# Patient Record
Sex: FEMALE | Race: WHITE | NOT HISPANIC OR LATINO | Employment: OTHER | URBAN - METROPOLITAN AREA
[De-identification: names, ages, dates, MRNs, and addresses within clinical notes are randomized per-mention and may not be internally consistent; named-entity substitution may affect disease eponyms.]

---

## 2017-03-24 ENCOUNTER — HOSPITAL ENCOUNTER (EMERGENCY)
Facility: MEDICAL CENTER | Age: 77
End: 2017-03-24
Attending: EMERGENCY MEDICINE
Payer: MEDICARE

## 2017-03-24 ENCOUNTER — APPOINTMENT (OUTPATIENT)
Dept: RADIOLOGY | Facility: MEDICAL CENTER | Age: 77
End: 2017-03-24
Attending: EMERGENCY MEDICINE
Payer: MEDICARE

## 2017-03-24 VITALS
HEART RATE: 68 BPM | BODY MASS INDEX: 26.25 KG/M2 | WEIGHT: 177.25 LBS | DIASTOLIC BLOOD PRESSURE: 82 MMHG | SYSTOLIC BLOOD PRESSURE: 143 MMHG | HEIGHT: 69 IN | RESPIRATION RATE: 16 BRPM | OXYGEN SATURATION: 96 % | TEMPERATURE: 98.4 F

## 2017-03-24 DIAGNOSIS — M54.32 SCIATICA OF LEFT SIDE: ICD-10-CM

## 2017-03-24 LAB
APPEARANCE UR: CLEAR
BILIRUB UR QL STRIP.AUTO: NEGATIVE
COLOR UR: ABNORMAL
CULTURE IF INDICATED INDCX: NO UA CULTURE
EKG IMPRESSION: NORMAL
GLUCOSE UR STRIP.AUTO-MCNC: NEGATIVE MG/DL
KETONES UR STRIP.AUTO-MCNC: ABNORMAL MG/DL
LEUKOCYTE ESTERASE UR QL STRIP.AUTO: NEGATIVE
MICRO URNS: ABNORMAL
NITRITE UR QL STRIP.AUTO: NEGATIVE
PH UR STRIP.AUTO: 5 [PH]
PROT UR QL STRIP: NEGATIVE MG/DL
RBC UR QL AUTO: NEGATIVE
SP GR UR REFRACTOMETRY: 1.03

## 2017-03-24 PROCEDURE — 99284 EMERGENCY DEPT VISIT MOD MDM: CPT

## 2017-03-24 PROCEDURE — 81003 URINALYSIS AUTO W/O SCOPE: CPT

## 2017-03-24 PROCEDURE — A9270 NON-COVERED ITEM OR SERVICE: HCPCS | Performed by: EMERGENCY MEDICINE

## 2017-03-24 PROCEDURE — 93005 ELECTROCARDIOGRAM TRACING: CPT

## 2017-03-24 PROCEDURE — 72131 CT LUMBAR SPINE W/O DYE: CPT

## 2017-03-24 PROCEDURE — 74176 CT ABD & PELVIS W/O CONTRAST: CPT

## 2017-03-24 PROCEDURE — 700102 HCHG RX REV CODE 250 W/ 637 OVERRIDE(OP): Performed by: EMERGENCY MEDICINE

## 2017-03-24 RX ORDER — OXYCODONE HYDROCHLORIDE AND ACETAMINOPHEN 5; 325 MG/1; MG/1
1 TABLET ORAL ONCE
Status: COMPLETED | OUTPATIENT
Start: 2017-03-24 | End: 2017-03-24

## 2017-03-24 RX ORDER — METHYLPREDNISOLONE 4 MG/1
TABLET ORAL
Qty: 1 KIT | Refills: 0 | Status: SHIPPED | OUTPATIENT
Start: 2017-03-24 | End: 2017-05-07

## 2017-03-24 RX ORDER — IBUPROFEN 600 MG/1
600 TABLET ORAL ONCE
Status: COMPLETED | OUTPATIENT
Start: 2017-03-24 | End: 2017-03-24

## 2017-03-24 RX ORDER — OXYCODONE HYDROCHLORIDE AND ACETAMINOPHEN 5; 325 MG/1; MG/1
1-2 TABLET ORAL EVERY 6 HOURS PRN
Qty: 20 TAB | Refills: 0 | Status: SHIPPED | OUTPATIENT
Start: 2017-03-24 | End: 2017-05-07

## 2017-03-24 RX ORDER — OMEPRAZOLE 20 MG/1
20 CAPSULE, DELAYED RELEASE ORAL DAILY
Qty: 20 CAP | Refills: 0 | Status: SHIPPED | OUTPATIENT
Start: 2017-03-24 | End: 2017-05-07

## 2017-03-24 RX ADMIN — IBUPROFEN 600 MG: 600 TABLET ORAL at 13:00

## 2017-03-24 RX ADMIN — OXYCODONE HYDROCHLORIDE AND ACETAMINOPHEN 1 TABLET: 5; 325 TABLET ORAL at 13:00

## 2017-03-24 ASSESSMENT — PAIN SCALES - GENERAL: PAINLEVEL_OUTOF10: 8

## 2017-03-24 NOTE — ED AVS SNAPSHOT
3/24/2017          Ailyn Saavedra  [address] [facility] [address]    Dear Ailyn:    [facility] wants to ensure your discharge home is safe and you or your loved ones have had all your questions answered regarding your care after you leave the hospital.    You may receive a telephone call within two days of your discharge.  This call is to make certain you understand your discharge instructions as well as ensure we provided you with the best care possible during your stay with us.     The call will only last approximately 3-5 minutes and will be done by a nurse.    Once again, we want to ensure your discharge home is safe and that you have a clear understanding of any next steps in your care.  If you have any questions or concerns, please do not hesitate to contact us, we are here for you.  Thank you for choosing [facility] for your healthcare needs.    Sincerely,    Frank Crawford    [facility]

## 2017-03-24 NOTE — ED AVS SNAPSHOT
Home Care Instructions                                                                                                                Ailyn Saavedra   MRN: [id number]    Department:  [facility], Emergency Dept   Date of Visit:  3/24/2017            [facility], Emergency Dept    [address]    [place] [address]    Phone:  [phone]      You were seen by     Jarrett Seth M.D.      Your Diagnosis Was     Sciatica of left side     M54.32       Follow-up Information     1. Follow up with Elie Sandoval M.D..    Specialty:  Internal Medicine    Contact information    [address]  [place] [address] [phone]          2. Follow up with Ramón Cooley M.D..    Specialty:  Oncology    Contact information    [address]   [address]  [place] [address] [phone]        Medication Information     Review all of your home medications and newly ordered medications with your primary doctor and/or pharmacist as soon as possible. Follow medication instructions as directed by your doctor and/or pharmacist.     Please keep your complete medication list with you and share with your physician. Update the information when medications are discontinued, doses are changed, or new medications (including over-the-counter products) are added; and carry medication information at all times in the event of emergency situations.               Medication List      START taking these medications        Instructions    Morning Afternoon Evening Bedtime    MethylPREDNISolone 4 MG Tbpk   Commonly known as:  MEDROL DOSEPAK        Use as directed                        omeprazole 20 MG delayed-release capsule   Commonly known as:  PRILOSEC        Take 1 Cap by mouth every day.   Dose:  20 mg                        oxycodone-acetaminophen 5-325 MG Tabs   Commonly known as:  PERCOCET        Take 1-2 Tabs by mouth every 6 hours as needed (as needed for pain).   Dose:  1-2 Tab                               Where to Get Your Medications      You can get these medications from any pharmacy     Bring a paper prescription for each of these medications    - MethylPREDNISolone 4 MG Tbpk  - omeprazole 20 MG delayed-release capsule  - oxycodone-acetaminophen 5-325 MG Tabs            Procedures and tests performed during your visit     CT-LSPINE W/O PLUS RECONS    CT-RENAL COLIC EVALUATION(A/P W/O)    EKG (ER)    REFRACTOMETER SG    URINALYSIS,CULTURE IF INDICATED        Discharge Instructions       Sciatica    Medrol for inflammation--take Prilosec while doing this.  (Do not take Ibuprofen or Naproxen while on this.)    Percocet OR Vicodin (your old prescription) for further pain.  Call today to make follow up appt with your doctor next week for recheck for both your back and those nodules in your chest.    Sciatica is pain, weakness, numbness, or tingling along the path of the sciatic nerve. The nerve starts in the lower back and runs down the back of each leg. The nerve controls the muscles in the lower leg and in the back of the knee, while also providing sensation to the back of the thigh, lower leg, and the sole of your foot. Sciatica is a symptom of another medical condition. For instance, nerve damage or certain conditions, such as a herniated disk or bone spur on the spine, pinch or put pressure on the sciatic nerve. This causes the pain, weakness, or other sensations normally associated with sciatica. Generally, sciatica only affects one side of the body.  CAUSES   · Herniated or slipped disc.  · Degenerative disk disease.  · A pain disorder involving the narrow muscle in the buttocks (piriformis syndrome).  · Pelvic injury or fracture.  · Pregnancy.  · Tumor (rare).  SYMPTOMS   Symptoms can vary from mild to very severe. The symptoms usually travel from the low back to the buttocks and down the back of the leg. Symptoms can include:  · Mild tingling or dull aches in the lower back, leg,  or hip.  · Numbness in the back of the calf or sole of the foot.  · Burning sensations in the lower back, leg, or hip.  · Sharp pains in the lower back, leg, or hip.  · Leg weakness.  · Severe back pain inhibiting movement.  These symptoms may get worse with coughing, sneezing, laughing, or prolonged sitting or standing. Also, being overweight may worsen symptoms.  DIAGNOSIS   Your caregiver will perform a physical exam to look for common symptoms of sciatica. He or she may ask you to do certain movements or activities that would trigger sciatic nerve pain. Other tests may be performed to find the cause of the sciatica. These may include:  · Blood tests.  · X-rays.  · Imaging tests, such as an MRI or CT scan.  TREATMENT   Treatment is directed at the cause of the sciatic pain. Sometimes, treatment is not necessary and the pain and discomfort goes away on its own. If treatment is needed, your caregiver may suggest:  · Over-the-counter medicines to relieve pain.  · Prescription medicines, such as anti-inflammatory medicine, muscle relaxants, or narcotics.  · Applying heat or ice to the painful area.  · Steroid injections to lessen pain, irritation, and inflammation around the nerve.  · Reducing activity during periods of pain.  · Exercising and stretching to strengthen your abdomen and improve flexibility of your spine. Your caregiver may suggest losing weight if the extra weight makes the back pain worse.  · Physical therapy.  · Surgery to eliminate what is pressing or pinching the nerve, such as a bone spur or part of a herniated disk.  HOME CARE INSTRUCTIONS   · Only take over-the-counter or prescription medicines for pain or discomfort as directed by your caregiver.  · Apply ice to the affected area for 20 minutes, 3-4 times a day for the first 48-72 hours. Then try heat in the same way.  · Exercise, stretch, or perform your usual activities if these do not aggravate your pain.  · Attend physical therapy sessions  as directed by your caregiver.  · Keep all follow-up appointments as directed by your caregiver.  · Do not wear high heels or shoes that do not provide proper support.  · Check your mattress to see if it is too soft. A firm mattress may lessen your pain and discomfort.  SEEK IMMEDIATE MEDICAL CARE IF:   · You lose control of your bowel or bladder (incontinence).  · You have increasing weakness in the lower back, pelvis, buttocks, or legs.  · You have redness or swelling of your back.  · You have a burning sensation when you urinate.  · You have pain that gets worse when you lie down or awakens you at night.  · Your pain is worse than you have experienced in the past.  · Your pain is lasting longer than 4 weeks.  · You are suddenly losing weight without reason.  MAKE SURE YOU:  · Understand these instructions.  · Will watch your condition.  · Will get help right away if you are not doing well or get worse.     This information is not intended to replace advice given to you by your health care provider. Make sure you discuss any questions you have with your health care provider.     Document Released: 12/12/2002 Document Revised: 06/18/2013 Document Reviewed: 04/28/2013  [employer] Interactive Patient Education ©2016 [employer]            Patient Information     Patient Information    Following emergency treatment: all patient requiring follow-up care must return either to a private physician or a clinic if your condition worsens before you are able to obtain further medical attention, please return to the emergency room.     Billing Information    At [facility], we work to make the billing process streamlined for our patients.  Our Representatives are here to answer any questions you may have regarding your hospital bill.  If you have insurance coverage and have supplied your insurance information to us, we will submit a claim to your insurer on your behalf.  Should you have any questions regarding your bill, we  can be reached online or by phone as follows:  Online: You are able pay your bills online or live chat with our representatives about any billing questions you may have. We are here to help Monday - Friday from 8:00am to 7:30pm and 9:00am - 12:00pm on Saturdays.  Please visit [email or web address]  for more information.   Phone:  [phone] or [phone]    Please note that your emergency physician, surgeon, pathologist, radiologist, anesthesiologist, and other specialists are not employed by [facility] and will therefore bill separately for their services.  Please contact them directly for any questions concerning their bills at the numbers below:     [facility] Physician [facility]:  [phone]  [facility]:  [phone]  [facility]:  [phone]  [facility]:  [phone]    1. Your final bill may vary from the amount quoted upon discharge if all procedures are not complete at that time, or if your doctor has additional procedures of which we are not aware. You will receive an additional bill if you return to the Emergency Department at [facility] for suture removal regardless of the facility of which the sutures were placed.     2. Please arrange for settlement of this account at the emergency registration.    3. All self-pay accounts are due in full at the time of treatment.  If you are unable to meet this obligation then payment is expected within 4-5 days.     4. If you have had radiology studies (CT, X-ray, Ultrasound, MRI), you have received a preliminary result during your emergency department visit. Please contact the radiology department [phone] to receive a copy of your final result. Please discuss the Final result with your primary physician or with the follow up physician provided.     Crisis Hotline:  [place] Crisis Hotline:  3-288-WIACRUR or 1-135.803.2355  [place] Crisis Hotline:    1-577.700.4603 or  463.279.5029         ED Discharge Follow Up Questions    1. In order to provide you with very good care, we would like to follow up with a phone call in the next few days.  May we have your permission to contact you?     YES /  NO    2. What is the best phone number to call you? (       )_____-__________    3. What is the best time to call you?      Morning  /  Afternoon  /  Evening                   Patient Signature:  ____________________________________________________________    Date:  ____________________________________________________________

## 2017-03-24 NOTE — ED NOTES
Pt amb to triage c/o L sciatica pain since last night. Pt was attempting to move a heavy chair yesterday.

## 2017-03-24 NOTE — ED AVS SNAPSHOT
Biophytis Access Code: [email or web address]  Expires: 4/23/2017  1:12 PM    Biophytis  A secure, online tool to manage your health information     [employer]’s Biophytis® is a secure, online tool that connects you to your personalized health information from the privacy of your home -- day or night - making it very easy for you to manage your healthcare. Once the activation process is completed, you can even access your medical information using the Biophytis ozzy, which is available for free in the Apple Ozzy store or Google Play store.     Biophytis provides the following levels of access (as shown below):   My Chart Features   [facility] Primary Care Doctor [facility]  Specialists [facility]  Urgent  Care Non-[facility]  Primary Care  Doctor   Email your healthcare team securely and privately 24/7 X X X X   Manage appointments: schedule your next appointment; view details of past/upcoming appointments X      Request prescription refills. X      View recent personal medical records, including lab and immunizations X X X X   View health record, including health history, allergies, medications X X X X   Read reports about your outpatient visits, procedures, consult and ER notes X X X X   See your discharge summary, which is a recap of your hospital and/or ER visit that includes your diagnosis, lab results, and care plan. X X       How to register for Biophytis:  1. Go to  [email or web address].  2. Click on the Sign Up Now box, which takes you to the New Member Sign Up page. You will need to provide the following information:  a. Enter your Biophytis Access Code exactly as it appears at the top of this page. (You will not need to use this code after you’ve completed the sign-up process. If you do not sign up before the expiration date, you must request a new code.)   b. Enter your date of birth.   c. Enter your home email address.   d. Click Submit, and follow the next screen’s instructions.  3. Create a Biophytis ID. This will be your Biophytis  login ID and cannot be changed, so think of one that is secure and easy to remember.  4. Create a [employer] password. You can change your password at any time.  5. Enter your Password Reset Question and Answer. This can be used at a later time if you forget your password.   6. Enter your e-mail address. This allows you to receive e-mail notifications when new information is available in [employer].  7. Click Sign Up. You can now view your health information.    For assistance activating your [employer] account, call [phone]

## 2017-03-24 NOTE — DISCHARGE INSTRUCTIONS
Sciatica    Medrol for inflammation--take Prilosec while doing this.  (Do not take Ibuprofen or Naproxen while on this.)    Percocet OR Vicodin (your old prescription) for further pain.  Call today to make follow up appt with your doctor next week for recheck for both your back and those nodules in your chest.    Sciatica is pain, weakness, numbness, or tingling along the path of the sciatic nerve. The nerve starts in the lower back and runs down the back of each leg. The nerve controls the muscles in the lower leg and in the back of the knee, while also providing sensation to the back of the thigh, lower leg, and the sole of your foot. Sciatica is a symptom of another medical condition. For instance, nerve damage or certain conditions, such as a herniated disk or bone spur on the spine, pinch or put pressure on the sciatic nerve. This causes the pain, weakness, or other sensations normally associated with sciatica. Generally, sciatica only affects one side of the body.  CAUSES   · Herniated or slipped disc.  · Degenerative disk disease.  · A pain disorder involving the narrow muscle in the buttocks (piriformis syndrome).  · Pelvic injury or fracture.  · Pregnancy.  · Tumor (rare).  SYMPTOMS   Symptoms can vary from mild to very severe. The symptoms usually travel from the low back to the buttocks and down the back of the leg. Symptoms can include:  · Mild tingling or dull aches in the lower back, leg, or hip.  · Numbness in the back of the calf or sole of the foot.  · Burning sensations in the lower back, leg, or hip.  · Sharp pains in the lower back, leg, or hip.  · Leg weakness.  · Severe back pain inhibiting movement.  These symptoms may get worse with coughing, sneezing, laughing, or prolonged sitting or standing. Also, being overweight may worsen symptoms.  DIAGNOSIS   Your caregiver will perform a physical exam to look for common symptoms of sciatica. He or she may ask you to do certain movements or  activities that would trigger sciatic nerve pain. Other tests may be performed to find the cause of the sciatica. These may include:  · Blood tests.  · X-rays.  · Imaging tests, such as an MRI or CT scan.  TREATMENT   Treatment is directed at the cause of the sciatic pain. Sometimes, treatment is not necessary and the pain and discomfort goes away on its own. If treatment is needed, your caregiver may suggest:  · Over-the-counter medicines to relieve pain.  · Prescription medicines, such as anti-inflammatory medicine, muscle relaxants, or narcotics.  · Applying heat or ice to the painful area.  · Steroid injections to lessen pain, irritation, and inflammation around the nerve.  · Reducing activity during periods of pain.  · Exercising and stretching to strengthen your abdomen and improve flexibility of your spine. Your caregiver may suggest losing weight if the extra weight makes the back pain worse.  · Physical therapy.  · Surgery to eliminate what is pressing or pinching the nerve, such as a bone spur or part of a herniated disk.  HOME CARE INSTRUCTIONS   · Only take over-the-counter or prescription medicines for pain or discomfort as directed by your caregiver.  · Apply ice to the affected area for 20 minutes, 3-4 times a day for the first 48-72 hours. Then try heat in the same way.  · Exercise, stretch, or perform your usual activities if these do not aggravate your pain.  · Attend physical therapy sessions as directed by your caregiver.  · Keep all follow-up appointments as directed by your caregiver.  · Do not wear high heels or shoes that do not provide proper support.  · Check your mattress to see if it is too soft. A firm mattress may lessen your pain and discomfort.  SEEK IMMEDIATE MEDICAL CARE IF:   · You lose control of your bowel or bladder (incontinence).  · You have increasing weakness in the lower back, pelvis, buttocks, or legs.  · You have redness or swelling of your back.  · You have a burning  sensation when you urinate.  · You have pain that gets worse when you lie down or awakens you at night.  · Your pain is worse than you have experienced in the past.  · Your pain is lasting longer than 4 weeks.  · You are suddenly losing weight without reason.  MAKE SURE YOU:  · Understand these instructions.  · Will watch your condition.  · Will get help right away if you are not doing well or get worse.     This information is not intended to replace advice given to you by your health care provider. Make sure you discuss any questions you have with your health care provider.     Document Released: 12/12/2002 Document Revised: 06/18/2013 Document Reviewed: 04/28/2013  [employer] Interactive Patient Education ©2016 [employer]

## 2017-03-24 NOTE — ED PROVIDER NOTES
ED Provider Note    CHIEF COMPLAINT  Chief Complaint   Patient presents with   • Low Back Pain       HPI  Ailyn Saavedra is a 76 y.o. female who presents complaining of low back pain. She has pain over her left buttock which radiates all the way down to her ankle. She was moving a heavy chair yesterday wonders if she may have injured it doing this, but it did not hurt initially. It started hurting later yesterday evening. It hurts if she moves certain ways patient denies any other trauma. She tried a hydrocodone from 4-1/2 years ago which did not help. She presents here for further evaluation. She denies any bowel or bladder changes. No fever or chills. No belly pain. She is very active with tenderness and raising her great grandchild.    PAST MEDICAL HISTORY  Past Medical History   Diagnosis Date   • Breast cancer (CMS-HCC)    • Other specified symptom associated with female genital organs    • CATARACT    • Cancer (CMS-[facility])      eye lid and breast    • Breath shortness      with exercise       FAMILY HISTORY  Family History   Problem Relation Age of Onset   • Cancer Mother      breast    • Cancer Sister      breast    • Cancer Sister      breast    • Cancer Sister      breast        SOCIAL HISTORY  Social History   Substance Use Topics   • Smoking status: Former Smoker -- 0.50 packs/day for 3 years     Types: Cigarettes     Quit date: 01/01/1959   • Smokeless tobacco: Never Used   • Alcohol Use: 2.4 oz/week     4 Glasses of wine per week         SURGICAL HISTORY  Past Surgical History   Procedure Laterality Date   • Lumpectomy     • Pr radiation therapy plan simple     • Other       cataracts   • Hysteroscopy with video operative  4/3/2013     Performed by Demetrio Webb M.D. at SURGERY SAME DAY [facility] ORS   • Dilation and curettage  4/3/2013     Performed by Demetrio Webb M.D. at SURGERY SAME DAY [facility] ORS       CURRENT MEDICATIONS  Home Medications     Reviewed by Damian Ackerman (Pharmacy  "Tech) on 03/24/17 at 1101  Med List Status: Complete    Medication Last Dose Status          Patient Manish Taking any Medications                        I have reviewed the nurses notes and/or the list brought with the patient.    ALLERGIES  No Known Allergies    REVIEW OF SYSTEMS  See HPI for further details. Review of systems as above, otherwise all other systems are negative.     PHYSICAL EXAM  VITAL SIGNS: /82 mmHg  Pulse 57  Temp(Src) 36.9 °C (98.4 °F)  Resp 16  Ht 1.753 m (5' 9\")  Wt 80.4 kg (177 lb 4 oz)  BMI 26.16 kg/m2  SpO2 94%  Constitutional: Well appearing patient in no acute distress.  Not toxic, nor ill in appearance.  HENT: Mucus membranes moist.  Oropharynx is clear.  Eyes: Pupils equally round.  No scleral icterus.   Neck: Full nontender range of motion.  Lymphatic: No cervical lymphadenopathy noted.   Cardiovascular: Regular heart rate and rhythm.  No murmurs, rubs, nor gallop appreciated.   Thorax & Lungs: Chest is nontender.  Lungs are clear to auscultation with good air movement bilaterally.  No wheeze, rhonchi, nor rales.   Abdomen: Soft, with no tenderness, rebound nor guarding.  No mass, pulsatile mass, nor hepatosplenomegaly appreciated.  Skin: No purpura nor petechia noted.  Extremities/Musculoskeletal: No sign of trauma.  Calves are nontender with no cords nor edema.  No Hernán's sign.  Pulses are intact all around.   Neurologic: Alert & oriented.  Strength and sensation is intact all around.  Gait is normal. Negative straight leg raise. Deep tendon reflexes at the knee and ankle are intact.  Psychiatric: Normal affect appropriate for the clinical situation.    EKG  I interpreted this EKG myself.  This is a 12-lead study.  The rhythm is sinus with a rate of 59.  There are no ST segment nor T wave abnormalities.  Interpretation: No ST segment elevation myocardial infarction.    LABS  Labs Reviewed   URINALYSIS,CULTURE IF INDICATED - Abnormal; Notable for the following:     " Ketones Trace (*)     All other components within normal limits   REFRACTOMETER SG         RADIOLOGY/PROCEDURES  I have reviewed the patient's film interpretations myself, and they are read out by the radiologist as:   CT-LSPINE W/O PLUS RECONS   Final Result      1.  No acute findings.      2.  Mild multilevel degenerative disc disease and facet arthropathy as described.      CT-RENAL COLIC EVALUATION(A/P W/O)   Final Result      1.  No acute intra-abdominal findings.      2.  Atherosclerosis.      3. Bibasilar pulmonary nodules. If there are no significant risk factors, follow-up is recommended in 6-12 months. If the patient has risk factors for lung cancer, follow-up is recommended in 3-6 months. If the patient has a history of malignancy,    follow-up per oncologic guidelines.  (Fleischner society guidelines, Radiology, 237:2005)        .    MEDICAL RECORD  I have reviewed patient's medical record and pertinent results are listed above.    COURSE & MEDICAL DECISION MAKING  I have reviewed any medical record information, laboratory studies and radiographic results as noted above.  Patient presents with what sounds to be lumbar radiculopathy. Her examination is reassuring. She has no red flag symptoms or signs of epidural abscess or cord compression. However, because of her age I have obtained imaging. Her belly CT shows no evidence of acute abdominal findings such as AAA or masses. She does have some pulmonary nodules. She does have a history of breast cancer; as far she knows she is disease free for the last 10 years. I did recommend that she touch base this upcoming week with Dr. oColey her oncologist regarding this. She may also follow with Dr. Sandoval her personal doctor for this, but also like her to follow up regarding her back with him. I will go ahead and put her on a Medrol Dosepak, she is to take Prilosec while doing this. I did advise against Naprosyn or ibuprofen while taking steroids. We will go ahead  and write her for Percocet as well, she may take this or her previous Norco for continued pain.    FINAL IMPRESSION  1. Sciatica of left side     2. Pulmonary nodules       This dictation was created using voice recognition software.    Electronically signed by: Jarrett Seth, 3/24/2017 11:25 AM

## 2017-05-07 ENCOUNTER — HOSPITAL ENCOUNTER (EMERGENCY)
Facility: MEDICAL CENTER | Age: 77
End: 2017-05-07
Attending: GENERAL ACUTE CARE HOSPITAL
Payer: MEDICARE

## 2017-05-07 ENCOUNTER — APPOINTMENT (OUTPATIENT)
Dept: RADIOLOGY | Facility: MEDICAL CENTER | Age: 77
End: 2017-05-07
Attending: GENERAL ACUTE CARE HOSPITAL
Payer: MEDICARE

## 2017-05-07 VITALS
WEIGHT: 178.57 LBS | RESPIRATION RATE: 16 BRPM | HEART RATE: 82 BPM | DIASTOLIC BLOOD PRESSURE: 87 MMHG | SYSTOLIC BLOOD PRESSURE: 158 MMHG | TEMPERATURE: 97.6 F | HEIGHT: 69 IN | BODY MASS INDEX: 26.45 KG/M2 | OXYGEN SATURATION: 95 %

## 2017-05-07 DIAGNOSIS — S42.292A FRACTURE OF HUMERAL HEAD, LEFT, CLOSED, INITIAL ENCOUNTER: ICD-10-CM

## 2017-05-07 PROCEDURE — 99284 EMERGENCY DEPT VISIT MOD MDM: CPT

## 2017-05-07 PROCEDURE — 700102 HCHG RX REV CODE 250 W/ 637 OVERRIDE(OP): Performed by: GENERAL ACUTE CARE HOSPITAL

## 2017-05-07 PROCEDURE — A9270 NON-COVERED ITEM OR SERVICE: HCPCS | Performed by: GENERAL ACUTE CARE HOSPITAL

## 2017-05-07 PROCEDURE — 73030 X-RAY EXAM OF SHOULDER: CPT | Mod: LT

## 2017-05-07 RX ORDER — OXYCODONE HYDROCHLORIDE AND ACETAMINOPHEN 5; 325 MG/1; MG/1
1 TABLET ORAL ONCE
Status: COMPLETED | OUTPATIENT
Start: 2017-05-07 | End: 2017-05-07

## 2017-05-07 RX ORDER — ACETAMINOPHEN 500 MG
500-1000 TABLET ORAL EVERY 6 HOURS PRN
Status: SHIPPED | COMMUNITY
End: 2019-09-12

## 2017-05-07 RX ADMIN — OXYCODONE HYDROCHLORIDE AND ACETAMINOPHEN 1 TABLET: 5; 325 TABLET ORAL at 19:07

## 2017-05-07 ASSESSMENT — PAIN SCALES - GENERAL
PAINLEVEL_OUTOF10: 4
PAINLEVEL_OUTOF10: 7

## 2017-05-07 NOTE — ED AVS SNAPSHOT
Home Care Instructions                                                                                                                Ailyn Saavedra   MRN: [id number]    Department:  [facility], Emergency Dept   Date of Visit:  5/7/2017            [facility], Emergency Dept    [address]    [place] [address]    Phone:  [phone]      You were seen by     Gerry Reyes M.D.      Your Diagnosis Was     Fracture of humeral head, left, closed, initial encounter     S42.292A       These are the medications you received during your hospitalization from 05/07/2017 1612 to 05/07/2017 1922     Date/Time Order Dose Route Action    05/07/2017 1907 oxycodone-acetaminophen (PERCOCET) 5-325 MG per tablet 1 Tab 1 Tab Oral Given      Follow-up Information     1. Follow up with Bobby Wallace M.D..    Specialty:  Orthopaedics    Contact information    [address]  [address]  [place] [address]  [phone]        Medication Information     Review all of your home medications and newly ordered medications with your primary doctor and/or pharmacist as soon as possible. Follow medication instructions as directed by your doctor and/or pharmacist.     Please keep your complete medication list with you and share with your physician. Update the information when medications are discontinued, doses are changed, or new medications (including over-the-counter products) are added; and carry medication information at all times in the event of emergency situations.               Medication List      ASK your doctor about these medications        Instructions    Morning Afternoon Evening Bedtime    acetaminophen 500 MG Tabs   Commonly known as:  TYLENOL        Take 500-1,000 mg by mouth every 6 hours as needed.   Dose:  500-1000 mg                                Procedures and tests performed during your visit     DX-SHOULDER 2+ LEFT        Discharge Instructions          Humerus Fracture Treated With Immobilization  The humerus is the large bone in the upper arm. A broken (fractured) humerus is often treated by wearing a cast, splint, or sling (immobilization). This holds the broken pieces in place so they can heal.   HOME CARE  · Put ice on the injured area.  ¨ Put ice in a plastic bag.  ¨ Place a towel between your skin and the bag.  ¨ Leave the ice on for 15-20 minutes, 03-04 times a day.  · If you are given a cast:  ¨ Do not scratch the skin under the cast.  ¨ Check the skin around the cast every day. You may put lotion on any red or sore areas.  ¨ Keep the cast dry and clean.  · If you are given a splint:  ¨ Wear the splint as told.  ¨ Keep the splint clean and dry.  ¨ Loosen the elastic around the splint if your fingers become numb, cold, tingle, or turn blue.  · If you are given a sling:  ¨ Wear the sling as told.  · Do not put pressure on any part of the cast or splint until it is fully hardened.  · The cast or splint must be protected with a plastic bag during bathing. Do not lower the cast or splint into water.  · Only take medicine as told by your doctor.  · Do exercises as told by your doctor.  · Follow up as told by your doctor.  GET HELP RIGHT AWAY IF:   · Your skin or fingernails turn blue or gray.  · Your arm feels cold or numb.  · You have very bad pain in the injured arm.  · You are having problems with the medicines you were given.  MAKE SURE YOU:   · Understand these instructions.  · Will watch your condition.  · Will get help right away if you are not doing well or get worse.     This information is not intended to replace advice given to you by your health care provider. Make sure you discuss any questions you have with your health care provider.     Document Released: 06/05/2009 Document Revised: 01/08/2016 Document Reviewed: 02/01/2012  [employer] Interactive Patient Education ©2016 [employer]            Patient Information     Patient  Information    Following emergency treatment: all patient requiring follow-up care must return either to a private physician or a clinic if your condition worsens before you are able to obtain further medical attention, please return to the emergency room.     Billing Information    At [facility], we work to make the billing process streamlined for our patients.  Our Representatives are here to answer any questions you may have regarding your hospital bill.  If you have insurance coverage and have supplied your insurance information to us, we will submit a claim to your insurer on your behalf.  Should you have any questions regarding your bill, we can be reached online or by phone as follows:  Online: You are able pay your bills online or live chat with our representatives about any billing questions you may have. We are here to help Monday - Friday from 8:00am to 7:30pm and 9:00am - 12:00pm on Saturdays.  Please visit [email or web address]  for more information.   Phone:  [phone] or [phone]    Please note that your emergency physician, surgeon, pathologist, radiologist, anesthesiologist, and other specialists are not employed by [facility] and will therefore bill separately for their services.  Please contact them directly for any questions concerning their bills at the numbers below:     Emergency Physician Services:  [phone]  [facility]:  [phone]  [facility]:  [phone]  [facility]:  [phone]    1. Your final bill may vary from the amount quoted upon discharge if all procedures are not complete at that time, or if your doctor has additional procedures of which we are not aware. You will receive an additional bill if you return to the Emergency Department at [facility] for suture removal regardless of the facility of which the sutures were placed.     2. Please arrange for settlement of this account  at the emergency registration.    3. All self-pay accounts are due in full at the time of treatment.  If you are unable to meet this obligation then payment is expected within 4-5 days.     4. If you have had radiology studies (CT, X-ray, Ultrasound, MRI), you have received a preliminary result during your emergency department visit. Please contact the radiology department [phone] to receive a copy of your final result. Please discuss the Final result with your primary physician or with the follow up physician provided.     Crisis Hotline:  [place] Crisis Hotline:  9-116-CNZLYLJ or 1-940.548.9926  [place] Crisis Hotline:    1-432.760.4492 or 224-141-4290         ED Discharge Follow Up Questions    1. In order to provide you with very good care, we would like to follow up with a phone call in the next few days.  May we have your permission to contact you?     YES /  NO    2. What is the best phone number to call you? (       )_____-__________    3. What is the best time to call you?      Morning  /  Afternoon  /  Evening                   Patient Signature:  ____________________________________________________________    Date:  ____________________________________________________________

## 2017-05-07 NOTE — ED AVS SNAPSHOT
5/7/2017    Ailyn Saavedra  [address] [place] [address]    Dear Ailyn:    [facility] wants to ensure your discharge home is safe and you or your loved ones have had all of your questions answered regarding your care after you leave the hospital.    Below is a list of resources and contact information should you have any questions regarding your hospital stay, follow-up instructions, or active medical symptoms.    Questions or Concerns Regarding… Contact   Medical Questions Related to Your Discharge  (7 days a week, 8am-5pm) Contact a Nurse Care Coordinator   [phone]   Medical Questions Not Related to Your Discharge  (24 hours a day / 7 days a week)  Contact the Nurse Health Line   [phone]    Medications or Discharge Instructions Refer to your discharge packet   or contact your [facility] Primary Care Provider   [phone]   Follow-up Appointment(s) Schedule your appointment via Secure[employer]   or contact Scheduling [phone]   Billing Review your statement via [employer]DB  or contact Billing [phone]   Medical Records Review your records via SecureDB   or contact Medical Records [phone]     You may receive a telephone call within two days of discharge. This call is to make certain you understand your discharge instructions and have the opportunity to have any questions answered. You can also easily access your medical information, test results and upcoming appointments via the SecureDB free online health management tool. You can learn more and sign up at [email or web address]. For assistance setting up your SecureDB account, please call [phone].    Once again, we want to ensure your discharge home is safe and that you have a clear understanding of any next steps in your care. If you have any questions or concerns, please do not hesitate to contact us, we are here for you. Thank you for choosing [facility] for your healthcare needs.    Sincerely,    Your [facility] Healthcare Team

## 2017-05-07 NOTE — ED AVS SNAPSHOT
castaclip Access Code: [email or web address]  Expires: 6/6/2017  7:22 PM    castaclip  A secure, online tool to manage your health information     [employer]’s castaclip® is a secure, online tool that connects you to your personalized health information from the privacy of your home -- day or night - making it very easy for you to manage your healthcare. Once the activation process is completed, you can even access your medical information using the castaclip ozzy, which is available for free in the Apple Ozzy store or Google Play store.     castaclip provides the following levels of access (as shown below):   My Chart Features   [facility] Primary Care Doctor [facility]  Specialists [facility]  Urgent  Care Non-[facility]  Primary Care  Doctor   Email your healthcare team securely and privately 24/7 X X X X   Manage appointments: schedule your next appointment; view details of past/upcoming appointments X      Request prescription refills. X      View recent personal medical records, including lab and immunizations X X X X   View health record, including health history, allergies, medications X X X X   Read reports about your outpatient visits, procedures, consult and ER notes X X X X   See your discharge summary, which is a recap of your hospital and/or ER visit that includes your diagnosis, lab results, and care plan. X X       How to register for castaclip:  1. Go to  [email or web address].  2. Click on the Sign Up Now box, which takes you to the New Member Sign Up page. You will need to provide the following information:  a. Enter your castaclip Access Code exactly as it appears at the top of this page. (You will not need to use this code after you’ve completed the sign-up process. If you do not sign up before the expiration date, you must request a new code.)   b. Enter your date of birth.   c. Enter your home email address.   d. Click Submit, and follow the next screen’s instructions.  3. Create a castaclip ID. This will be your castaclip  login ID and cannot be changed, so think of one that is secure and easy to remember.  4. Create a [employer] password. You can change your password at any time.  5. Enter your Password Reset Question and Answer. This can be used at a later time if you forget your password.   6. Enter your e-mail address. This allows you to receive e-mail notifications when new information is available in [employer].  7. Click Sign Up. You can now view your health information.    For assistance activating your [employer] account, call [phone]

## 2017-05-08 NOTE — ED NOTES
Pt given verbal and written discharge instructions, verbalized understanding. Pt left with family.

## 2017-05-08 NOTE — DISCHARGE INSTRUCTIONS
Humerus Fracture Treated With Immobilization  The humerus is the large bone in the upper arm. A broken (fractured) humerus is often treated by wearing a cast, splint, or sling (immobilization). This holds the broken pieces in place so they can heal.   HOME CARE  · Put ice on the injured area.  ¨ Put ice in a plastic bag.  ¨ Place a towel between your skin and the bag.  ¨ Leave the ice on for 15-20 minutes, 03-04 times a day.  · If you are given a cast:  ¨ Do not scratch the skin under the cast.  ¨ Check the skin around the cast every day. You may put lotion on any red or sore areas.  ¨ Keep the cast dry and clean.  · If you are given a splint:  ¨ Wear the splint as told.  ¨ Keep the splint clean and dry.  ¨ Loosen the elastic around the splint if your fingers become numb, cold, tingle, or turn blue.  · If you are given a sling:  ¨ Wear the sling as told.  · Do not put pressure on any part of the cast or splint until it is fully hardened.  · The cast or splint must be protected with a plastic bag during bathing. Do not lower the cast or splint into water.  · Only take medicine as told by your doctor.  · Do exercises as told by your doctor.  · Follow up as told by your doctor.  GET HELP RIGHT AWAY IF:   · Your skin or fingernails turn blue or gray.  · Your arm feels cold or numb.  · You have very bad pain in the injured arm.  · You are having problems with the medicines you were given.  MAKE SURE YOU:   · Understand these instructions.  · Will watch your condition.  · Will get help right away if you are not doing well or get worse.     This information is not intended to replace advice given to you by your health care provider. Make sure you discuss any questions you have with your health care provider.     Document Released: 06/05/2009 Document Revised: 01/08/2016 Document Reviewed: 02/01/2012  [employer] Interactive Patient Education ©2016 [employer]

## 2017-05-08 NOTE — ED PROVIDER NOTES
"ED Provider Note    CHIEF COMPLAINT  Chief Complaint   Patient presents with   • Fall   • Shoulder Pain       HPI  Ailyn Saavedra is a 77 y.o. female who presents with left shoulder pain after a trip and fall over an area rug at her family member's house today. She denies head trauma vision changes loss of consciousness neck pain chest pain back pain hip pain or leg pain. Patient denies pain in her left elbow forearm or wrist. Patient was in her usual state of health prior to this incident.    REVIEW OF SYSTEMS  See HPI for further details. All other systems are negative.     PAST MEDICAL HISTORY   has a past medical history of Breast cancer (CMS-[facility]); Other specified symptom associated with female genital organs; CATARACT; Cancer (CMS-[facility]); and Breath shortness.    SOCIAL HISTORY  Social History     Social History Main Topics   • Smoking status: Former Smoker -- 0.50 packs/day for 3 years     Types: Cigarettes     Quit date: 01/01/1959   • Smokeless tobacco: Never Used   • Alcohol Use: 2.4 oz/week     4 Glasses of wine per week      Comment: Occasionally   • Drug Use: No   • Sexual Activity:     Partners: Male       SURGICAL HISTORY   has past surgical history that includes lumpectomy; radiation therapy plan simple; other; hysteroscopy with video operative (4/3/2013); and dilation and curettage (4/3/2013).    CURRENT MEDICATIONS  Home Medications     Reviewed by Thai Kelly R.N. (Registered Nurse) on 05/07/17 at 1621  Med List Status: Complete    Medication Last Dose Status    acetaminophen (TYLENOL) 500 MG Tab 5/7/2017 Active                ALLERGIES  No Known Allergies    PHYSICAL EXAM  VITAL SIGNS: /94 mmHg  Pulse 74  Temp(Src) 36.4 °C (97.6 °F)  Resp 18  Ht 1.753 m (5' 9.02\")  Wt 81 kg (178 lb 9.2 oz)  BMI 26.36 kg/m2  SpO2 96% @BRITTANIE[[id number]::@  Pulse ox interpretation: I interpret this pulse ox as normal.  Constitutional: Alert in mild painful distress. Well-developed well-nourished pleasant " elderly female  HENT: Normocephalic, Atraumatic, Bilateral external ears normal. Nose normal.   Eyes: Pupils are equal and reactive. Conjunctiva normal, non-icteric.   Heart: Regular rate and rythm, no murmurs.    Lungs: Clear to auscultation bilaterally.  Skin: Warm, Dry, No erythema, No rash.   Musculoskeletal:  No posterior cervical thoracic or midline bony tenderness. No tenderness over the clavicles or before meals joint. No elbow forearm wrist or hand tenderness or ecchymoses. Swelling and tenderness over lateral and anterior shoulder near the lower edge of deltoid muscle. No significant ecchymoses, no break in the skin.  Neurologic: Alert, Grossly non-focal.   Psychiatric: Affect normal, Judgment normal, Mood normal, Appears appropriate and not intoxicated.           COURSE & MEDICAL DECISION MAKING  Pertinent Labs & Imaging studies reviewed. (See chart for details)        Imaging Results           DX-SHOULDER 2+ LEFT (Final result) Result time: 05/07/17 18:33:14     Final result by Justice Teran M.D. (05/07/17 18:33:14)     Impression:         1. Acute comminuted displaced impacted fracture of the left humeral head..     Narrative:       5/7/2017 6:14 PM    HISTORY/REASON FOR EXAM:  Pain/Deformity Following Trauma  Fall    TECHNIQUE/EXAM DESCRIPTION AND NUMBER OF VIEWS:  3 views of the LEFT shoulder.    COMPARISON: None    FINDINGS:  Diffuse osseous demineralization.  Comminuted displaced impacted fracture of the humeral head.  Postsurgical change in the left axilla              The patient will not drink alcohol nor drive with prescribed medications. The patient will return for worsening symptoms and is stable at the time of discharge. The patient verbalizes understanding and will comply.    Patient given a arm sling and pain medicine and plan to follow up with orthopedics tomorrow or the next day. On reexamination after sling patient has sensation intact and equal in both arms including over deltoid  muscle. And patient has 2+ equal radial pulses bilaterally with good forearm strength and sensation.    FINAL IMPRESSION  1. Acute left comminuted displaced humeral head fracture  2.   3.         Electronically signed by: Gerry Reyes, 5/7/2017 6:44 PM

## 2017-07-08 ENCOUNTER — HOSPITAL ENCOUNTER (OUTPATIENT)
Dept: RADIOLOGY | Facility: MEDICAL CENTER | Age: 77
End: 2017-07-08
Attending: INTERNAL MEDICINE
Payer: MEDICARE

## 2017-07-08 DIAGNOSIS — Z12.31 VISIT FOR SCREENING MAMMOGRAM: ICD-10-CM

## 2017-07-08 PROCEDURE — 77063 BREAST TOMOSYNTHESIS BI: CPT

## 2017-07-21 ENCOUNTER — HOSPITAL ENCOUNTER (OUTPATIENT)
Dept: LAB | Facility: MEDICAL CENTER | Age: 77
End: 2017-07-21
Attending: INTERNAL MEDICINE
Payer: MEDICARE

## 2017-07-21 LAB
ALBUMIN SERPL BCP-MCNC: 4 G/DL (ref 3.2–4.9)
ALBUMIN/GLOB SERPL: 1.5 G/DL
ALP SERPL-CCNC: 65 U/L (ref 30–99)
ALT SERPL-CCNC: 20 U/L (ref 2–50)
ANION GAP SERPL CALC-SCNC: 7 MMOL/L (ref 0–11.9)
AST SERPL-CCNC: 18 U/L (ref 12–45)
BASOPHILS # BLD AUTO: 0.6 % (ref 0–1.8)
BASOPHILS # BLD: 0.05 K/UL (ref 0–0.12)
BILIRUB SERPL-MCNC: 0.7 MG/DL (ref 0.1–1.5)
BUN SERPL-MCNC: 19 MG/DL (ref 8–22)
CALCIUM SERPL-MCNC: 9.9 MG/DL (ref 8.5–10.5)
CHLORIDE SERPL-SCNC: 103 MMOL/L (ref 96–112)
CO2 SERPL-SCNC: 26 MMOL/L (ref 20–33)
CREAT SERPL-MCNC: 0.98 MG/DL (ref 0.5–1.4)
EOSINOPHIL # BLD AUTO: 0.27 K/UL (ref 0–0.51)
EOSINOPHIL NFR BLD: 3.4 % (ref 0–6.9)
ERYTHROCYTE [DISTWIDTH] IN BLOOD BY AUTOMATED COUNT: 42.7 FL (ref 35.9–50)
GFR SERPL CREATININE-BSD FRML MDRD: 55 ML/MIN/1.73 M 2
GLOBULIN SER CALC-MCNC: 2.7 G/DL (ref 1.9–3.5)
GLUCOSE SERPL-MCNC: 101 MG/DL (ref 65–99)
HCT VFR BLD AUTO: 44.2 % (ref 37–47)
HGB BLD-MCNC: 14.7 G/DL (ref 12–16)
IMM GRANULOCYTES # BLD AUTO: 0.02 K/UL (ref 0–0.11)
IMM GRANULOCYTES NFR BLD AUTO: 0.3 % (ref 0–0.9)
LYMPHOCYTES # BLD AUTO: 2.11 K/UL (ref 1–4.8)
LYMPHOCYTES NFR BLD: 26.7 % (ref 22–41)
MCH RBC QN AUTO: 30.8 PG (ref 27–33)
MCHC RBC AUTO-ENTMCNC: 33.3 G/DL (ref 33.6–35)
MCV RBC AUTO: 92.7 FL (ref 81.4–97.8)
MONOCYTES # BLD AUTO: 0.83 K/UL (ref 0–0.85)
MONOCYTES NFR BLD AUTO: 10.5 % (ref 0–13.4)
NEUTROPHILS # BLD AUTO: 4.62 K/UL (ref 2–7.15)
NEUTROPHILS NFR BLD: 58.5 % (ref 44–72)
NRBC # BLD AUTO: 0 K/UL
NRBC BLD AUTO-RTO: 0 /100 WBC
PLATELET # BLD AUTO: 252 K/UL (ref 164–446)
PMV BLD AUTO: 10.6 FL (ref 9–12.9)
POTASSIUM SERPL-SCNC: 4.4 MMOL/L (ref 3.6–5.5)
PROT SERPL-MCNC: 6.7 G/DL (ref 6–8.2)
RBC # BLD AUTO: 4.77 M/UL (ref 4.2–5.4)
SODIUM SERPL-SCNC: 136 MMOL/L (ref 135–145)
WBC # BLD AUTO: 7.9 K/UL (ref 4.8–10.8)

## 2017-07-21 PROCEDURE — 85025 COMPLETE CBC W/AUTO DIFF WBC: CPT

## 2017-07-21 PROCEDURE — 80053 COMPREHEN METABOLIC PANEL: CPT

## 2017-07-21 PROCEDURE — 36415 COLL VENOUS BLD VENIPUNCTURE: CPT

## 2017-07-26 ENCOUNTER — HOSPITAL ENCOUNTER (OUTPATIENT)
Dept: RADIOLOGY | Facility: MEDICAL CENTER | Age: 77
End: 2017-07-26
Attending: INTERNAL MEDICINE
Payer: MEDICARE

## 2017-07-26 DIAGNOSIS — R91.1 COIN LESION: ICD-10-CM

## 2017-07-26 PROCEDURE — 71260 CT THORAX DX C+: CPT

## 2017-07-26 PROCEDURE — 700117 HCHG RX CONTRAST REV CODE 255: Performed by: INTERNAL MEDICINE

## 2017-07-26 RX ADMIN — IOHEXOL 100 ML: 350 INJECTION, SOLUTION INTRAVENOUS at 10:41

## 2017-11-09 ENCOUNTER — HOSPITAL ENCOUNTER (OUTPATIENT)
Dept: RADIOLOGY | Facility: MEDICAL CENTER | Age: 77
End: 2017-11-09
Attending: INTERNAL MEDICINE
Payer: MEDICARE

## 2017-11-09 DIAGNOSIS — Z85.3 PERSONAL HISTORY OF MALIGNANT NEOPLASM OF BREAST: ICD-10-CM

## 2017-11-09 PROCEDURE — 71250 CT THORAX DX C-: CPT

## 2018-05-22 ENCOUNTER — APPOINTMENT (RX ONLY)
Dept: URBAN - METROPOLITAN AREA CLINIC 20 | Facility: CLINIC | Age: 78
Setting detail: DERMATOLOGY
End: 2018-05-22

## 2018-05-22 DIAGNOSIS — L57.0 ACTINIC KERATOSIS: ICD-10-CM

## 2018-05-22 DIAGNOSIS — D18.0 HEMANGIOMA: ICD-10-CM

## 2018-05-22 DIAGNOSIS — Z71.89 OTHER SPECIFIED COUNSELING: ICD-10-CM

## 2018-05-22 DIAGNOSIS — L82.1 OTHER SEBORRHEIC KERATOSIS: ICD-10-CM

## 2018-05-22 DIAGNOSIS — L81.4 OTHER MELANIN HYPERPIGMENTATION: ICD-10-CM

## 2018-05-22 DIAGNOSIS — D22 MELANOCYTIC NEVI: ICD-10-CM

## 2018-05-22 DIAGNOSIS — Z85.828 PERSONAL HISTORY OF OTHER MALIGNANT NEOPLASM OF SKIN: ICD-10-CM

## 2018-05-22 PROBLEM — D22.61 MELANOCYTIC NEVI OF RIGHT UPPER LIMB, INCLUDING SHOULDER: Status: ACTIVE | Noted: 2018-05-22

## 2018-05-22 PROBLEM — D22.39 MELANOCYTIC NEVI OF OTHER PARTS OF FACE: Status: ACTIVE | Noted: 2018-05-22

## 2018-05-22 PROBLEM — D22.71 MELANOCYTIC NEVI OF RIGHT LOWER LIMB, INCLUDING HIP: Status: ACTIVE | Noted: 2018-05-22

## 2018-05-22 PROBLEM — D22.5 MELANOCYTIC NEVI OF TRUNK: Status: ACTIVE | Noted: 2018-05-22

## 2018-05-22 PROBLEM — D48.5 NEOPLASM OF UNCERTAIN BEHAVIOR OF SKIN: Status: ACTIVE | Noted: 2018-05-22

## 2018-05-22 PROBLEM — D22.62 MELANOCYTIC NEVI OF LEFT UPPER LIMB, INCLUDING SHOULDER: Status: ACTIVE | Noted: 2018-05-22

## 2018-05-22 PROBLEM — D18.01 HEMANGIOMA OF SKIN AND SUBCUTANEOUS TISSUE: Status: ACTIVE | Noted: 2018-05-22

## 2018-05-22 PROBLEM — D22.72 MELANOCYTIC NEVI OF LEFT LOWER LIMB, INCLUDING HIP: Status: ACTIVE | Noted: 2018-05-22

## 2018-05-22 PROCEDURE — ? OBSERVATION

## 2018-05-22 PROCEDURE — 11101: CPT

## 2018-05-22 PROCEDURE — 11100: CPT | Mod: 59

## 2018-05-22 PROCEDURE — ? SUNSCREEN RECOMMENDATIONS

## 2018-05-22 PROCEDURE — 99203 OFFICE O/P NEW LOW 30 MIN: CPT | Mod: 25

## 2018-05-22 PROCEDURE — ? LIQUID NITROGEN

## 2018-05-22 PROCEDURE — ? COUNSELING

## 2018-05-22 PROCEDURE — ? BIOPSY BY SHAVE METHOD

## 2018-05-22 PROCEDURE — 17000 DESTRUCT PREMALG LESION: CPT

## 2018-05-22 ASSESSMENT — LOCATION DETAILED DESCRIPTION DERM
LOCATION DETAILED: RIGHT INFERIOR MEDIAL UPPER BACK
LOCATION DETAILED: RIGHT SUPERIOR UPPER BACK
LOCATION DETAILED: RIGHT INFERIOR CENTRAL MALAR CHEEK
LOCATION DETAILED: LEFT DISTAL PRETIBIAL REGION
LOCATION DETAILED: LEFT VENTRAL PROXIMAL FOREARM
LOCATION DETAILED: LEFT LATERAL PROXIMAL PRETIBIAL REGION
LOCATION DETAILED: RIGHT LATERAL BREAST 7-8:00 REGION
LOCATION DETAILED: INFERIOR THORACIC SPINE
LOCATION DETAILED: RIGHT INFERIOR MEDIAL MIDBACK
LOCATION DETAILED: RIGHT DISTAL PRETIBIAL REGION
LOCATION DETAILED: RIGHT INFERIOR FOREHEAD
LOCATION DETAILED: LEFT PROXIMAL POSTERIOR UPPER ARM
LOCATION DETAILED: RIGHT VENTRAL PROXIMAL FOREARM
LOCATION DETAILED: LEFT DISTAL POSTERIOR THIGH
LOCATION DETAILED: RIGHT DISTAL POSTERIOR THIGH
LOCATION DETAILED: RIGHT PROXIMAL PRETIBIAL REGION
LOCATION DETAILED: LEFT MEDIAL INFERIOR EYELID
LOCATION DETAILED: RIGHT DISTAL POSTERIOR UPPER ARM

## 2018-05-22 ASSESSMENT — LOCATION SIMPLE DESCRIPTION DERM
LOCATION SIMPLE: RIGHT LOWER BACK
LOCATION SIMPLE: RIGHT POSTERIOR THIGH
LOCATION SIMPLE: RIGHT POSTERIOR UPPER ARM
LOCATION SIMPLE: RIGHT UPPER BACK
LOCATION SIMPLE: UPPER BACK
LOCATION SIMPLE: LEFT PRETIBIAL REGION
LOCATION SIMPLE: LEFT FOREARM
LOCATION SIMPLE: RIGHT FOREARM
LOCATION SIMPLE: LEFT INFERIOR EYELID
LOCATION SIMPLE: RIGHT FOREHEAD
LOCATION SIMPLE: RIGHT BREAST
LOCATION SIMPLE: LEFT POSTERIOR UPPER ARM
LOCATION SIMPLE: LEFT POSTERIOR THIGH
LOCATION SIMPLE: RIGHT CHEEK
LOCATION SIMPLE: RIGHT PRETIBIAL REGION

## 2018-05-22 ASSESSMENT — LOCATION ZONE DERM
LOCATION ZONE: LEG
LOCATION ZONE: FACE
LOCATION ZONE: TRUNK
LOCATION ZONE: ARM
LOCATION ZONE: EYELID

## 2018-05-22 NOTE — PROCEDURE: BIOPSY BY SHAVE METHOD
Lab: [id number]
Biopsy Type: H and E
Size Of Lesion In Cm: 0.5
Render Post-Care Instructions In Note?: yes
Type Of Destruction Used: Curettage
Dressing: Band-Aid
Anesthesia Type: 1% lidocaine with 1:100,000 epinephrine and 408mcg clindamycin/ml and a 1:10 solution of 8.4% sodium bicarbonate
Bill 11887 For Specimen Handling/Conveyance To Laboratory?: no
Biopsy Method: Personna blade
Additional Anesthesia Volume In Cc (Will Not Render If 0): 0
Post-Care Instructions: I reviewed with the patient in detail post-care instructions. Patient is to keep the biopsy site clean once daily and then apply petroleum and bandaid  until healed.
Consent: Written consent was obtained and risks were reviewed including but not limited to scarring, infection, bleeding, scabbing, incomplete removal, nerve damage and allergy to anesthesia.
Billing Type: Third-Party Bill
Anesthesia Volume In Cc: 1
Detail Level: Detailed
Lab Facility: 
Wound Care: Bacitracin
Hemostasis: Drysol and Electrocautery
Notification Instructions: Patient will be notified of biopsy results. However, patient instructed to call the office if not contacted within 2 weeks.
X Size Of Lesion In Cm: 1.1
Size Of Lesion In Cm: 1.7
Size Of Lesion In Cm: 0.6
Lab Facility: [id number]

## 2018-05-22 NOTE — PROCEDURE: LIQUID NITROGEN
Consent: The patient's consent was obtained including but not limited to risks of crusting, scabbing, blistering, scarring, darker or lighter pigmentary change, recurrence, incomplete removal and infection. RTC in 2 months if lesion(s) persistent.
Detail Level: Detailed
Render Post-Care Instructions In Note?: yes
Duration Of Freeze Thaw-Cycle (Seconds): 10
Post-Care Instructions: I reviewed with the patient in detail post-care instructions. Patient is to wear sunprotection, and avoid picking at any of the treated lesions. Pt may apply Vaseline to crusted or scabbing areas.
Number Of Freeze-Thaw Cycles: 2 freeze-thaw cycles

## 2018-05-23 ENCOUNTER — APPOINTMENT (OUTPATIENT)
Dept: RADIOLOGY | Facility: MEDICAL CENTER | Age: 78
End: 2018-05-23
Attending: INTERNAL MEDICINE
Payer: MEDICARE

## 2018-05-31 ENCOUNTER — APPOINTMENT (RX ONLY)
Dept: URBAN - METROPOLITAN AREA CLINIC 20 | Facility: CLINIC | Age: 78
Setting detail: DERMATOLOGY
End: 2018-05-31

## 2018-05-31 PROBLEM — C44.311 BASAL CELL CARCINOMA OF SKIN OF NOSE: Status: ACTIVE | Noted: 2018-05-31

## 2018-05-31 PROCEDURE — ? MOHS SURGERY PHONE CONSULTATION

## 2018-05-31 NOTE — PROCEDURE: MOHS SURGERY PHONE CONSULTATION
Patient Reported Location: LEFT NASAL SIDEWALL
Time Of Mohs Surgery: 8:40AM
Patient Preferred Phone Number: [phone]
Referring Provider: KASIHF GRIGGS
Detail Level: Simple
Does The Patient Have An Artificial Heart Valve?: No
Office Location Of Mohs Surgery: [facility]
Patient's Insurance: MEDICARE/BCBS
Does The Patient Take Blood Thinners?: Yes
Date Of Mohs Surgery: 06/19/2018
If Yes- What Blood Thinners Do They Take?: Aspirin
Pathology Accession #: [id number]
Which Antibiotic Do They Take For Surgical Prophylaxis?: Amoxicillin (2 grams)

## 2018-06-14 ENCOUNTER — APPOINTMENT (RX ONLY)
Dept: URBAN - METROPOLITAN AREA CLINIC 4 | Facility: CLINIC | Age: 78
Setting detail: DERMATOLOGY
End: 2018-06-14

## 2018-06-14 PROBLEM — C44.519 BASAL CELL CARCINOMA OF SKIN OF OTHER PART OF TRUNK: Status: ACTIVE | Noted: 2018-06-14

## 2018-06-14 PROBLEM — C44.719 BASAL CELL CARCINOMA OF SKIN OF LEFT LOWER LIMB, INCLUDING HIP: Status: ACTIVE | Noted: 2018-06-14

## 2018-06-14 PROCEDURE — 99212 OFFICE O/P EST SF 10 MIN: CPT

## 2018-06-14 PROCEDURE — ? OBSERVATION

## 2018-06-14 PROCEDURE — ? PRESCRIPTION

## 2018-06-14 PROCEDURE — ? MEDICATION COUNSELING

## 2018-06-14 RX ORDER — IMIQUIMOD 50 MG/G
CREAM TOPICAL
Qty: 6 | Refills: 2 | Status: ERX | COMMUNITY
Start: 2018-06-14

## 2018-06-14 RX ADMIN — IMIQUIMOD: 50 CREAM TOPICAL at 00:00

## 2018-06-14 NOTE — PROCEDURE: MEDICATION COUNSELING
Spironolactone Pregnancy And Lactation Text: This medication can cause feminization of the male fetus and should be avoided during pregnancy. The active metabolite is also found in breast milk.
Tazorac Counseling:  Patient advised that medication is irritating and drying.  Patient may need to apply sparingly and wash off after an hour before eventually leaving it on overnight.  The patient verbalized understanding of the proper use and possible adverse effects of tazorac.  All of the patient's questions and concerns were addressed.
Griseofulvin Counseling:  I discussed with the patient the risks of griseofulvin including but not limited to photosensitivity, cytopenia, liver damage, nausea/vomiting and severe allergy.  The patient understands that this medication is best absorbed when taken with a fatty meal (e.g., ice cream or french fries).
Hydroxyzine Counseling: Patient advised that the medication is sedating and not to drive a car after taking this medication.  Patient informed of potential adverse effects including but not limited to dry mouth, urinary retention, and blurry vision.  The patient verbalized understanding of the proper use and possible adverse effects of hydroxyzine.  All of the patient's questions and concerns were addressed.
Azathioprine Counseling:  I discussed with the patient the risks of azathioprine including but not limited to myelosuppression, immunosuppression, hepatotoxicity, lymphoma, and infections.  The patient understands that monitoring is required including baseline LFTs, Creatinine, possible TPMP genotyping and weekly CBCs for the first month and then every 2 weeks thereafter.  The patient verbalized understanding of the proper use and possible adverse effects of azathioprine.  All of the patient's questions and concerns were addressed.
Benzoyl Peroxide Pregnancy And Lactation Text: This medication is Pregnancy Category C. It is unknown if benzoyl peroxide is excreted in breast milk.
Doxepin Counseling:  Patient advised that the medication is sedating and not to drive a car after taking this medication. Patient informed of potential adverse effects including but not limited to dry mouth, urinary retention, and blurry vision.  The patient verbalized understanding of the proper use and possible adverse effects of doxepin.  All of the patient's questions and concerns were addressed.
Include Pregnancy/Lactation Warning?: No
Gabapentin Counseling: I discussed with the patient the risks of gabapentin including but not limited to dizziness, somnolence, fatigue and ataxia.
Cephalexin Pregnancy And Lactation Text: This medication is Pregnancy Category B and considered safe during pregnancy.  It is also excreted in breast milk but can be used safely for shorter doses.
Itraconazole Counseling:  I discussed with the patient the risks of itraconazole including but not limited to liver damage, nausea/vomiting, neuropathy, and severe allergy.  The patient understands that this medication is best absorbed when taken with acidic beverages such as non-diet cola or ginger ale.  The patient understands that monitoring is required including baseline LFTs and repeat LFTs at intervals.  The patient understands that they are to contact us or the primary physician if concerning signs are noted.
Quinolones Counseling:  I discussed with the patient the risks of fluoroquinolones including but not limited to GI upset, allergic reaction, drug rash, diarrhea, dizziness, photosensitivity, yeast infections, liver function test abnormalities, tendonitis/tendon rupture.
Dapsone Pregnancy And Lactation Text: This medication is Pregnancy Category C and is not considered safe during pregnancy or breast feeding.
Bexarotene Counseling:  I discussed with the patient the risks of bexarotene including but not limited to hair loss, dry lips/skin/eyes, liver abnormalities, hyperlipidemia, pancreatitis, depression/suicidal ideation, photosensitivity, drug rash/allergic reactions, hypothyroidism, anemia, leukopenia, infection, cataracts, and teratogenicity.  Patient understands that they will need regular blood tests to check lipid profile, liver function tests, white blood cell count, thyroid function tests and pregnancy test if applicable.
Fluconazole Counseling:  Patient counseled regarding adverse effects of fluconazole including but not limited to headache, diarrhea, nausea, upset stomach, liver function test abnormalities, taste disturbance, and stomach pain.  There is a rare possibility of liver failure that can occur when taking fluconazole.  The patient understands that monitoring of LFTs and kidney function test may be required, especially at baseline. The patient verbalized understanding of the proper use and possible adverse effects of fluconazole.  All of the patient's questions and concerns were addressed.
Imiquimod Counseling:  I discussed with the patient the risks of imiquimod including but not limited to erythema, scaling, itching, weeping, crusting, and pain.  Patient understands that the inflammatory response to imiquimod is variable from person to person and was educated regarded proper titration schedule.  If flu-like symptoms develop, patient knows to discontinue the medication and contact us.
Arava Pregnancy And Lactation Text: This medication is Pregnancy Category X and is absolutely contraindicated during pregnancy. It is unknown if it is excreted in breast milk.
Hydroquinone Counseling:  Patient advised that medication may result in skin irritation, lightening (hypopigmentation), dryness, and burning.  In the event of skin irritation, the patient was advised to reduce the amount of the drug applied or use it less frequently.  Rarely, spots that are treated with hydroquinone can become darker (pseudoochronosis).  Should this occur, patient instructed to stop medication and call the office. The patient verbalized understanding of the proper use and possible adverse effects of hydroquinone.  All of the patient's questions and concerns were addressed.
Sski Pregnancy And Lactation Text: This medication is Pregnancy Category D and isn't considered safe during pregnancy. It is excreted in breast milk.
5-Fu Pregnancy And Lactation Text: This medication is Pregnancy Category X and contraindicated in pregnancy and in women who may become pregnant. It is unknown if this medication is excreted in breast milk.
Rituxan Pregnancy And Lactation Text: This medication is Pregnancy Category C and it isn't know if it is safe during pregnancy. It is unknown if this medication is excreted in breast milk but similar antibodies are known to be excreted.
Nsaids Counseling: NSAID Counseling: I discussed with the patient that NSAIDs should be taken with food. Prolonged use of NSAIDs can result in the development of stomach ulcers.  Patient advised to stop taking NSAIDs if abdominal pain occurs.  The patient verbalized understanding of the proper use and possible adverse effects of NSAIDs.  All of the patient's questions and concerns were addressed.
Oxybutynin Pregnancy And Lactation Text: This medication is Pregnancy Category B and is considered safe during pregnancy. It is unknown if it is excreted in breast milk.
Cimetidine Counseling:  I discussed with the patient the risks of Cimetidine including but not limited to gynecomastia, headache, diarrhea, nausea, drowsiness, arrhythmias, pancreatitis, skin rashes, psychosis, bone marrow suppression and kidney toxicity.
Albendazole Pregnancy And Lactation Text: This medication is Pregnancy Category C and it isn't known if it is safe during pregnancy. It is also excreted in breast milk.
Doxycycline Counseling:  Patient counseled regarding possible photosensitivity and increased risk for sunburn.  Patient instructed to avoid sunlight, if possible.  When exposed to sunlight, patients should wear protective clothing, sunglasses, and sunscreen.  The patient was instructed to call the office immediately if the following severe adverse effects occur:  hearing changes, easy bruising/bleeding, severe headache, or vision changes.  The patient verbalized understanding of the proper use and possible adverse effects of doxycycline.  All of the patient's questions and concerns were addressed.
Picato Counseling:  I discussed with the patient the risks of Picato including but not limited to erythema, scaling, itching, weeping, crusting, and pain.
Minocycline Counseling: Patient advised regarding possible photosensitivity and discoloration of the teeth, skin, lips, tongue and gums.  Patient instructed to avoid sunlight, if possible.  When exposed to sunlight, patients should wear protective clothing, sunglasses, and sunscreen.  The patient was instructed to call the office immediately if the following severe adverse effects occur:  hearing changes, easy bruising/bleeding, severe headache, or vision changes.  The patient verbalized understanding of the proper use and possible adverse effects of minocycline.  All of the patient's questions and concerns were addressed.
Hydroxyzine Pregnancy And Lactation Text: This medication is not safe during pregnancy and should not be taken. It is also excreted in breast milk and breast feeding isn't recommended.
Infliximab Pregnancy And Lactation Text: This medication is Pregnancy Category B and is considered safe during pregnancy. It is unknown if this medication is excreted in breast milk.
Elidel Counseling: Patient may experience a mild burning sensation during topical application. Elidel is not approved in children less than 2 years of age. There have been case reports of hematologic and skin malignancies in patients using topical calcineurin inhibitors although causality is questionable.
Cyclosporine Pregnancy And Lactation Text: This medication is Pregnancy Category C and it isn't know if it is safe during pregnancy. This medication is excreted in breast milk.
Eucrisa Counseling: Patient may experience a mild burning sensation during topical application. Eucrisa is not approved in children less than 2 years of age.
Tazorac Pregnancy And Lactation Text: This medication is not safe during pregnancy. It is unknown if this medication is excreted in breast milk.
Colchicine Pregnancy And Lactation Text: This medication is Pregnancy Category C and isn't considered safe during pregnancy. It is excreted in breast milk.
Xeljanz Counseling: I discussed with the patient the risks of Xeljanz therapy including increased risk of infection, liver issues, headache, diarrhea, or cold symptoms. Live vaccines should be avoided. They were instructed to call if they have any problems.
Odomzo Counseling- I discussed with the patient the risks of Odomzo including but not limited to nausea, vomiting, diarrhea, constipation, weight loss, changes in the sense of taste, decreased appetite, muscle spasms, and hair loss.  The patient verbalized understanding of the proper use and possible adverse effects of Odomzo.  All of the patient's questions and concerns were addressed.
Zyclara Pregnancy And Lactation Text: This medication is Pregnancy Category C. It is unknown if this medication is excreted in breast milk.
Ivermectin Counseling:  Patient instructed to take medication on an empty stomach with a full glass of water.  Patient informed of potential adverse effects including but not limited to nausea, diarrhea, dizziness, itching, and swelling of the extremities or lymph nodes.  The patient verbalized understanding of the proper use and possible adverse effects of ivermectin.  All of the patient's questions and concerns were addressed.
High Dose Vitamin A Counseling: Side effects reviewed, pt to contact office should one occur.
Erythromycin Pregnancy And Lactation Text: This medication is Pregnancy Category B and is considered safe during pregnancy. It is also excreted in breast milk.
Dupixent Pregnancy And Lactation Text: This medication likely crosses the placenta but the risk for the fetus is uncertain. This medication is excreted in breast milk.
Acitretin Counseling:  I discussed with the patient the risks of acitretin including but not limited to hair loss, dry lips/skin/eyes, liver damage, hyperlipidemia, depression/suicidal ideation, photosensitivity.  Serious rare side effects can include but are not limited to pancreatitis, pseudotumor cerebri, bony changes, clot formation/stroke/heart attack.  Patient understands that alcohol is contraindicated since it can result in liver toxicity and significantly prolong the elimination of the drug by many years.
Azithromycin Counseling:  I discussed with the patient the risks of azithromycin including but not limited to GI upset, allergic reaction, drug rash, diarrhea, and yeast infections.
Metronidazole Counseling:  I discussed with the patient the risks of metronidazole including but not limited to seizures, nausea/vomiting, a metallic taste in the mouth, nausea/vomiting and severe allergy.
Protopic Pregnancy And Lactation Text: This medication is Pregnancy Category C. It is unknown if this medication is excreted in breast milk when applied topically.
Glycopyrrolate Counseling:  I discussed with the patient the risks of glycopyrrolate including but not limited to skin rash, drowsiness, dry mouth, difficulty urinating, and blurred vision.
Terbinafine Pregnancy And Lactation Text: This medication is Pregnancy Category B and is considered safe during pregnancy. It is also excreted in breast milk and breast feeding isn't recommended.
Valtrex Counseling: I discussed with the patient the risks of valacyclovir including but not limited to kidney damage, nausea, vomiting and severe allergy.  The patient understands that if the infection seems to be worsening or is not improving, they are to call.
Minocycline Pregnancy And Lactation Text: This medication is Pregnancy Category D and not consider safe during pregnancy. It is also excreted in breast milk.
Hydroxychloroquine Counseling:  I discussed with the patient that a baseline ophthalmologic exam is needed at the start of therapy and every year thereafter while on therapy. A CBC may also be warranted for monitoring.  The side effects of this medication were discussed with the patient, including but not limited to agranulocytosis, aplastic anemia, seizures, rashes, retinopathy, and liver toxicity. Patient instructed to call the office should any adverse effect occur.  The patient verbalized understanding of the proper use and possible adverse effects of Plaquenil.  All the patient's questions and concerns were addressed.
Itraconazole Pregnancy And Lactation Text: This medication is Pregnancy Category C and it isn't know if it is safe during pregnancy. It is also excreted in breast milk.
Drysol Counseling:  I discussed with the patient the risks of drysol/aluminum chloride including but not limited to skin rash, itching, irritation, burning.
Acitretin Pregnancy And Lactation Text: This medication is Pregnancy Category X and should not be given to women who are pregnant or may become pregnant in the future. This medication is excreted in breast milk.
Topical Retinoid counseling:  Patient advised to apply a pea-sized amount only at bedtime and wait 30 minutes after washing their face before applying.  If too drying, patient may add a non-comedogenic moisturizer. The patient verbalized understanding of the proper use and possible adverse effects of retinoids.  All of the patient's questions and concerns were addressed.
Solaraze Counseling:  I discussed with the patient the risks of Solaraze including but not limited to erythema, scaling, itching, weeping, crusting, and pain.
Stelara Counseling:  I discussed with the patient the risks of ustekinumab including but not limited to immunosuppression, malignancy, posterior leukoencephalopathy syndrome, and serious infections.  The patient understands that monitoring is required including a PPD at baseline and must alert us or the primary physician if symptoms of infection or other concerning signs are noted.
Simponi Counseling:  I discussed with the patient the risks of golimumab including but not limited to myelosuppression, immunosuppression, autoimmune hepatitis, demyelinating diseases, lymphoma, and serious infections.  The patient understands that monitoring is required including a PPD at baseline and must alert us or the primary physician if symptoms of infection or other concerning signs are noted.
Ketoconazole Pregnancy And Lactation Text: This medication is Pregnancy Category C and it isn't know if it is safe during pregnancy. It is also excreted in breast milk and breast feeding isn't recommended.
Cephalexin Counseling: I counseled the patient regarding use of cephalexin as an antibiotic for prophylactic and/or therapeutic purposes. Cephalexin (commonly prescribed under brand name Keflex) is a cephalosporin antibiotic which is active against numerous classes of bacteria, including most skin bacteria. Side effects may include nausea, diarrhea, gastrointestinal upset, rash, hives, yeast infections, and in rare cases, hepatitis, kidney disease, seizures, fever, confusion, neurologic symptoms, and others. Patients with severe allergies to penicillin medications are cautioned that there is about a 10% incidence of cross-reactivity with cephalosporins. When possible, patients with penicillin allergies should use alternatives to cephalosporins for antibiotic therapy.
Bexarotene Pregnancy And Lactation Text: This medication is Pregnancy Category X and should not be given to women who are pregnant or may become pregnant. This medication should not be used if you are breast feeding.
Azithromycin Pregnancy And Lactation Text: This medication is considered safe during pregnancy and is also secreted in breast milk.
Topical Sulfur Applications Pregnancy And Lactation Text: This medication is Pregnancy Category C and has an unknown safety profile during pregnancy. It is unknown if this topical medication is excreted in breast milk.
Thalidomide Counseling: I discussed with the patient the risks of thalidomide including but not limited to birth defects, anxiety, weakness, chest pain, dizziness, cough and severe allergy.
Hydroquinone Pregnancy And Lactation Text: This medication has not been assigned a Pregnancy Risk Category but animal studies failed to show danger with the topical medication. It is unknown if the medication is excreted in breast milk.
5-Fu Counseling: 5-Fluorouracil Counseling:  I discussed with the patient the risks of 5-fluorouracil including but not limited to erythema, scaling, itching, weeping, crusting, and pain.
Topical Sulfur Applications Counseling: Topical Sulfur Counseling: Patient counseled that this medication may cause skin irritation or allergic reactions.  In the event of skin irritation, the patient was advised to reduce the amount of the drug applied or use it less frequently.   The patient verbalized understanding of the proper use and possible adverse effects of topical sulfur application.  All of the patient's questions and concerns were addressed.
High Dose Vitamin A Pregnancy And Lactation Text: High dose vitamin A therapy is contraindicated during pregnancy and breast feeding.
Isotretinoin Pregnancy And Lactation Text: This medication is Pregnancy Category X and is considered extremely dangerous during pregnancy. It is unknown if it is excreted in breast milk.
Spironolactone Counseling: Patient advised regarding risks of diarrhea, abdominal pain, hyperkalemia, birth defects (for female patients), liver toxicity and renal toxicity. The patient may need blood work to monitor liver and kidney function and potassium levels while on therapy. The patient verbalized understanding of the proper use and possible adverse effects of spironolactone.  All of the patient's questions and concerns were addressed.
Erivedge Counseling- I discussed with the patient the risks of Erivedge including but not limited to nausea, vomiting, diarrhea, constipation, weight loss, changes in the sense of taste, decreased appetite, muscle spasms, and hair loss.  The patient verbalized understanding of the proper use and possible adverse effects of Erivedge.  All of the patient's questions and concerns were addressed.
Doxepin Pregnancy And Lactation Text: This medication is Pregnancy Category C and it isn't known if it is safe during pregnancy. It is also excreted in breast milk and breast feeding isn't recommended.
Taltz Counseling: I discussed with the patient the risks of ixekizumab including but not limited to immunosuppression, serious infections, worsening of inflammatory bowel disease and drug reactions.  The patient understands that monitoring is required including a PPD at baseline and must alert us or the primary physician if symptoms of infection or other concerning signs are noted.
Cyclosporine Counseling:  I discussed with the patient the risks of cyclosporine including but not limited to hypertension, gingival hyperplasia,myelosuppression, immunosuppression, liver damage, kidney damage, neurotoxicity, lymphoma, and serious infections. The patient understands that monitoring is required including baseline blood pressure, CBC, CMP, lipid panel and uric acid, and then 1-2 times monthly CMP and blood pressure.
Prednisone Counseling:  I discussed with the patient the risks of prolonged use of prednisone including but not limited to weight gain, insomnia, osteoporosis, mood changes, diabetes, susceptibility to infection, glaucoma and high blood pressure.  In cases where prednisone use is prolonged, patients should be monitored with blood pressure checks, serum glucose levels and an eye exam.  Additionally, the patient may need to be placed on GI prophylaxis, PCP prophylaxis, and calcium and vitamin D supplementation and/or a bisphosphonate.  The patient verbalized understanding of the proper use and the possible adverse effects of prednisone.  All of the patient's questions and concerns were addressed.
Detail Level: Detailed
Methotrexate Pregnancy And Lactation Text: This medication is Pregnancy Category X and is known to cause fetal harm. This medication is excreted in breast milk.
Birth Control Pills Pregnancy And Lactation Text: This medication should be avoided if pregnant and for the first 30 days post-partum.
Benzoyl Peroxide Counseling: Patient counseled that medicine may cause skin irritation and bleach clothing.  In the event of skin irritation, the patient was advised to reduce the amount of the drug applied or use it less frequently.   The patient verbalized understanding of the proper use and possible adverse effects of benzoyl peroxide.  All of the patient's questions and concerns were addressed.
Otezla Pregnancy And Lactation Text: This medication is Pregnancy Category C and it isn't known if it is safe during pregnancy. It is unknown if it is excreted in breast milk.
Birth Control Pills Counseling: Birth Control Pill Counseling: I discussed with the patient the potential side effects of OCPs including but not limited to increased risk of stroke, heart attack, thrombophlebitis, deep venous thrombosis, hepatic adenomas, breast changes, GI upset, headaches, and depression.  The patient verbalized understanding of the proper use and possible adverse effects of OCPs. All of the patient's questions and concerns were addressed.
Albendazole Counseling:  I discussed with the patient the risks of albendazole including but not limited to cytopenia, kidney damage, nausea/vomiting and severe allergy.  The patient understands that this medication is being used in an off-label manner.
Isotretinoin Counseling: Patient should get monthly blood tests, not donate blood, not drive at night if vision affected, not share medication, and not undergo elective surgery for 6 months after tx completed. Side effects reviewed, pt to contact office should one occur.
Cellcept Counseling:  I discussed with the patient the risks of mycophenolate mofetil including but not limited to infection/immunosuppression, GI upset, hypokalemia, hypercholesterolemia, bone marrow suppression, lymphoproliferative disorders, malignancy, GI ulceration/bleed/perforation, colitis, interstitial lung disease, kidney failure, progressive multifocal leukoencephalopathy, and birth defects.  The patient understands that monitoring is required including a baseline creatinine and regular CBC testing. In addition, patient must alert us immediately if symptoms of infection or other concerning signs are noted.
Dupixent Counseling: I discussed with the patient the risks of dupilumab including but not limited to eye infection and irritation, cold sores, injection site reactions, worsening of asthma, allergic reactions and increased risk of parasitic infection.  Live vaccines should be avoided while taking dupilumab. Dupilumab will also interact with certain medications such as warfarin and cyclosporine. The patient understands that monitoring is required and they must alert us or the primary physician if symptoms of infection or other concerning signs are noted.
Terbinafine Counseling: Patient counseling regarding adverse effects of terbinafine including but not limited to headache, diarrhea, rash, upset stomach, liver function test abnormalities, itching, taste/smell disturbance, nausea, abdominal pain, and flatulence.  There is a rare possibility of liver failure that can occur when taking terbinafine.  The patient understands that a baseline LFT and kidney function test may be required. The patient verbalized understanding of the proper use and possible adverse effects of terbinafine.  All of the patient's questions and concerns were addressed.
Carac Counseling:  I discussed with the patient the risks of Carac including but not limited to erythema, scaling, itching, weeping, crusting, and pain.
Tremfya Pregnancy And Lactation Text: The risk during pregnancy and breastfeeding is uncertain with this medication.
Topical Clindamycin Counseling: Patient counseled that this medication may cause skin irritation or allergic reactions.  In the event of skin irritation, the patient was advised to reduce the amount of the drug applied or use it less frequently.   The patient verbalized understanding of the proper use and possible adverse effects of clindamycin.  All of the patient's questions and concerns were addressed.
Methotrexate Counseling:  Patient counseled regarding adverse effects of methotrexate including but not limited to nausea, vomiting, abnormalities in liver function tests. Patients may develop mouth sores, rash, diarrhea, and abnormalities in blood counts. The patient understands that monitoring is required including LFT's and blood counts.  There is a rare possibility of scarring of the liver and lung problems that can occur when taking methotrexate. Persistent nausea, loss of appetite, pale stools, dark urine, cough, and shortness of breath should be reported immediately. Patient advised to discontinue methotrexate treatment at least three months before attempting to become pregnant.  I discussed the need for folate supplements while taking methotrexate.  These supplements can decrease side effects during methotrexate treatment. The patient verbalized understanding of the proper use and possible adverse effects of methotrexate.  All of the patient's questions and concerns were addressed.
Cellcept Pregnancy And Lactation Text: This medication is Pregnancy Category D and isn't considered safe during pregnancy. It is unknown if this medication is excreted in breast milk.
Rifampin Pregnancy And Lactation Text: This medication is Pregnancy Category C and it isn't know if it is safe during pregnancy. It is also excreted in breast milk and should not be used if you are breast feeding.
Minoxidil Counseling: Minoxidil is a topical medication which can increase blood flow where it is applied. It is uncertain how this medication increases hair growth. Side effects are uncommon and include stinging and allergic reactions.
Xolair Pregnancy And Lactation Text: This medication is Pregnancy Category B and is considered safe during pregnancy. This medication is excreted in breast milk.
Xelcatz Pregnancy And Lactation Text: This medication is Pregnancy Category D and is not considered safe during pregnancy.  The risk during breast feeding is also uncertain.
Dapsone Counseling: I discussed with the patient the risks of dapsone including but not limited to hemolytic anemia, agranulocytosis, rashes, methemoglobinemia, kidney failure, peripheral neuropathy, headaches, GI upset, and liver toxicity.  Patients who start dapsone require monitoring including baseline LFTs and weekly CBCs for the first month, then every month thereafter.  The patient verbalized understanding of the proper use and possible adverse effects of dapsone.  All of the patient's questions and concerns were addressed.
Infliximab Counseling:  I discussed with the patient the risks of infliximab including but not limited to myelosuppression, immunosuppression, autoimmune hepatitis, demyelinating diseases, lymphoma, and serious infections.  The patient understands that monitoring is required including a PPD at baseline and must alert us or the primary physician if symptoms of infection or other concerning signs are noted.
Erythromycin Counseling:  I discussed with the patient the risks of erythromycin including but not limited to GI upset, allergic reaction, drug rash, diarrhea, increase in liver enzymes, and yeast infections.
Hydroxychloroquine Pregnancy And Lactation Text: This medication has been shown to cause fetal harm but it isn't assigned a Pregnancy Risk Category. There are small amounts excreted in breast milk.
Rituxan Counseling:  I discussed with the patient the risks of Rituxan infusions. Side effects can include infusion reactions, severe drug rashes including mucocutaneous reactions, reactivation of latent hepatitis and other infections and rarely progressive multifocal leukoencephalopathy.  All of the patient's questions and concerns were addressed.
Nsaids Pregnancy And Lactation Text: These medications are considered safe up to 30 weeks gestation. It is excreted in breast milk.
Bactrim Counseling:  I discussed with the patient the risks of sulfa antibiotics including but not limited to GI upset, allergic reaction, drug rash, diarrhea, dizziness, photosensitivity, and yeast infections.  Rarely, more serious reactions can occur including but not limited to aplastic anemia, agranulocytosis, methemoglobinemia, blood dyscrasias, liver or kidney failure, lung infiltrates or desquamative/blistering drug rashes.
Ketoconazole Counseling:   Patient counseled regarding improving absorption with orange juice.  Adverse effects include but are not limited to breast enlargement, headache, diarrhea, nausea, upset stomach, liver function test abnormalities, taste disturbance, and stomach pain.  There is a rare possibility of liver failure that can occur when taking ketoconazole. The patient understands that monitoring of LFTs may be required, especially at baseline. The patient verbalized understanding of the proper use and possible adverse effects of ketoconazole.  All of the patient's questions and concerns were addressed.
Griseofulvin Pregnancy And Lactation Text: This medication is Pregnancy Category X and is known to cause serious birth defects. It is unknown if this medication is excreted in breast milk but breast feeding should be avoided.
Enbrel Counseling:  I discussed with the patient the risks of etanercept including but not limited to myelosuppression, immunosuppression, autoimmune hepatitis, demyelinating diseases, lymphoma, and infections.  The patient understands that monitoring is required including a PPD at baseline and must alert us or the primary physician if symptoms of infection or other concerning signs are noted.
Humira Counseling:  I discussed with the patient the risks of adalimumab including but not limited to myelosuppression, immunosuppression, autoimmune hepatitis, demyelinating diseases, lymphoma, and serious infections.  The patient understands that monitoring is required including a PPD at baseline and must alert us or the primary physician if symptoms of infection or other concerning signs are noted.
Colchicine Counseling:  Patient counseled regarding adverse effects including but not limited to stomach upset (nausea, vomiting, stomach pain, or diarrhea).  Patient instructed to limit alcohol consumption while taking this medication.  Colchicine may reduce blood counts especially with prolonged use.  The patient understands that monitoring of kidney function and blood counts may be required, especially at baseline. The patient verbalized understanding of the proper use and possible adverse effects of colchicine.  All of the patient's questions and concerns were addressed.
Solaraze Pregnancy And Lactation Text: This medication is Pregnancy Category B and is considered safe. There is some data to suggest avoiding during the third trimester. It is unknown if this medication is excreted in breast milk.
Drysol Pregnancy And Lactation Text: This medication is considered safe during pregnancy and breast feeding.
Glycopyrrolate Pregnancy And Lactation Text: This medication is Pregnancy Category B and is considered safe during pregnancy. It is unknown if it is excreted breast milk.
Xolair Counseling:  Patient informed of potential adverse effects including but not limited to fever, muscle aches, rash and allergic reactions.  The patient verbalized understanding of the proper use and possible adverse effects of Xolair.  All of the patient's questions and concerns were addressed.
Oxybutynin Counseling:  I discussed with the patient the risks of oxybutynin including but not limited to skin rash, drowsiness, dry mouth, difficulty urinating, and blurred vision.
Tetracycline Counseling: Patient counseled regarding possible photosensitivity and increased risk for sunburn.  Patient instructed to avoid sunlight, if possible.  When exposed to sunlight, patients should wear protective clothing, sunglasses, and sunscreen.  The patient was instructed to call the office immediately if the following severe adverse effects occur:  hearing changes, easy bruising/bleeding, severe headache, or vision changes.  The patient verbalized understanding of the proper use and possible adverse effects of tetracycline.  All of the patient's questions and concerns were addressed. Patient understands to avoid pregnancy while on therapy due to potential birth defects.
Otezla Counseling: The side effects of Otezla were discussed with the patient, including but not limited to worsening or new depression, weight loss, diarrhea, nausea, upper respiratory tract infection, and headache. Patient instructed to call the office should any adverse effect occur.  The patient verbalized understanding of the proper use and possible adverse effects of Otezla.  All the patient's questions and concerns were addressed.
Arava Counseling:  Patient counseled regarding adverse effects of Arava including but not limited to nausea, vomiting, abnormalities in liver function tests. Patients may develop mouth sores, rash, diarrhea, and abnormalities in blood counts. The patient understands that monitoring is required including LFTs and blood counts.  There is a rare possibility of scarring of the liver and lung problems that can occur when taking methotrexate. Persistent nausea, loss of appetite, pale stools, dark urine, cough, and shortness of breath should be reported immediately. Patient advised to discontinue Arava treatment and consult with a physician prior to attempting conception. The patient will have to undergo a treatment to eliminate Arava from the body prior to conception.
Zyclara Counseling:  I discussed with the patient the risks of imiquimod including but not limited to erythema, scaling, itching, weeping, crusting, and pain.  Patient understands that the inflammatory response to imiquimod is variable from person to person and was educated regarded proper titration schedule.  If flu-like symptoms develop, patient knows to discontinue the medication and contact us.
Cosentyx Counseling:  I discussed with the patient the risks of Cosentyx including but not limited to worsening of Crohn's disease, immunosuppression, allergic reactions and infections.  The patient understands that monitoring is required including a PPD at baseline and must alert us or the primary physician if symptoms of infection or other concerning signs are noted.
Clofazimine Counseling:  I discussed with the patient the risks of clofazimine including but not limited to skin and eye pigmentation, liver damage, nausea/vomiting, gastrointestinal bleeding and allergy.
Doxycycline Pregnancy And Lactation Text: This medication is Pregnancy Category D and not consider safe during pregnancy. It is also excreted in breast milk but is considered safe for shorter treatment courses.
Rifampin Counseling: I discussed with the patient the risks of rifampin including but not limited to liver damage, kidney damage, red-orange body fluids, nausea/vomiting and severe allergy.
Protopic Counseling: Patient may experience a mild burning sensation during topical application. Protopic is not approved in children less than 2 years of age. There have been case reports of hematologic and skin malignancies in patients using topical calcineurin inhibitors although causality is questionable.
Valtrex Pregnancy And Lactation Text: this medication is Pregnancy Category B and is considered safe during pregnancy. This medication is not directly found in breast milk but it's metabolite acyclovir is present.
Metronidazole Pregnancy And Lactation Text: This medication is Pregnancy Category B and considered safe during pregnancy.  It is also excreted in breast milk.
SSKI Counseling:  I discussed with the patient the risks of SSKI including but not limited to thyroid abnormalities, metallic taste, GI upset, fever, headache, acne, arthralgias, paraesthesias, lymphadenopathy, easy bleeding, arrhythmias, and allergic reaction.
Bactrim Pregnancy And Lactation Text: This medication is Pregnancy Category D and is known to cause fetal risk.  It is also excreted in breast milk.
Tremfya Counseling: I discussed with the patient the risks of guselkumab including but not limited to immunosuppression, serious infections, worsening of inflammatory bowel disease and drug reactions.  The patient understands that monitoring is required including a PPD at baseline and must alert us or the primary physician if symptoms of infection or other concerning signs are noted.

## 2018-06-19 ENCOUNTER — APPOINTMENT (RX ONLY)
Dept: URBAN - METROPOLITAN AREA CLINIC 36 | Facility: CLINIC | Age: 78
Setting detail: DERMATOLOGY
End: 2018-06-19

## 2018-06-19 PROBLEM — C44.311 BASAL CELL CARCINOMA OF SKIN OF NOSE: Status: ACTIVE | Noted: 2018-06-19

## 2018-06-19 PROCEDURE — ? MOHS SURGERY

## 2018-06-19 PROCEDURE — 17312 MOHS ADDL STAGE: CPT

## 2018-06-19 PROCEDURE — 17311 MOHS 1 STAGE H/N/HF/G: CPT

## 2018-06-19 PROCEDURE — 14060 TIS TRNFR E/N/E/L 10 SQ CM/<: CPT

## 2018-06-19 NOTE — PROCEDURE: MOHS SURGERY
Quadrant Reporting?: no
Bcc Histology Text: There were numerous aggregates of basaloid cells.
Stage 8: Additional Anesthesia Type: 1% lidocaine with epinephrine
Partial Purse String (Intermediate) Text: Given the location of the defect and the characteristics of the surrounding skin an intermediate purse string closure was deemed most appropriate.  Undermining was performed circumfirentially around the surgical defect.  A purse string suture was then placed and tightened. Wound tension only allowed a partial closure of the circular defect.
Skin Substitute Units (Will Override Primary Defect Units If Greater Than 0): 0
O-L Flap Text: The defect edges were debeveled with a #15 scalpel blade.  Given the location of the defect, shape of the defect and the proximity to free margins an O-L flap was deemed most appropriate.  Using a sterile surgical marker, an appropriate advancement flap was drawn incorporating the defect and placing the expected incisions within the relaxed skin tension lines where possible.    The area thus outlined was incised deep to adipose tissue with a #15 scalpel blade.  The skin margins were undermined to an appropriate distance in all directions utilizing iris scissors.
Spiral Flap Text: The defect edges were debeveled with a #15 scalpel blade.  Given the location of the defect, shape of the defect and the proximity to free margins a spiral flap was deemed most appropriate.  Using a sterile surgical marker, an appropriate rotation flap was drawn incorporating the defect and placing the expected incisions within the relaxed skin tension lines where possible. The area thus outlined was incised deep to adipose tissue with a #15 scalpel blade.  The skin margins were undermined to an appropriate distance in all directions utilizing iris scissors.
Lazy S Complex Repair Preamble Text (Leave Blank If You Do Not Want): Extensive wide undermining was performed at least 2 cm in all directions.
Transposition Flap Text: The defect edges were debeveled with a #15 scalpel blade.  Given the location of the defect and the proximity to free margins a transposition flap was deemed most appropriate.  Using a sterile surgical marker, an appropriate transposition flap was drawn incorporating the defect.    The area thus outlined was incised deep to adipose tissue with a #15 scalpel blade.  The skin margins were undermined to an appropriate distance in all directions utilizing iris scissors.
Advancement Flap (Single) Text: The defect edges were debeveled with a #15 scalpel blade.  Given the location of the defect and the proximity to free margins a single advancement flap was deemed most appropriate.  Using a sterile surgical marker, an appropriate advancement flap was drawn incorporating the defect and placing the expected incisions within the relaxed skin tension lines where possible.    The area thus outlined was incised deep to adipose tissue with a #15 scalpel blade.  The skin margins were undermined to an appropriate distance in all directions utilizing iris scissors.
Area M Indication Text: Tumors in this location are included in Area M (cheek, forehead, scalp, neck, jawline and pretibial skin).  Mohs surgery is indicated for tumors in these anatomic locations.
Anesthesia Volume In Cc: 6
Unique Flap 3 Text: The defect edges were debeveled with a #15 scalpel blade.  Given the location of the defect, shape of the defect and the proximity to free margins a Mercedes (double advancement flap) was deemed most appropriate.  Using a sterile surgical marker, the appropriate transposition flaps were drawn incorporating the defect and placing the expected incisions within the relaxed skin tension lines where possible.    The area thus outlined was incised deep to adipose tissue with a #15 scalpel blade.  The skin margins were undermined to an appropriate distance in all directions utilizing iris scissors.  Hemostasis was achieved with electrocautery.  The flaps were then advanced into the defect and anchored with interrupted buried subcutaneous sutures.
Detail Level: Detailed
Referred To Asc For Closure Text (Leave Blank If You Do Not Want): After obtaining clear surgical margins the patient was sent to an ASC for surgical repair.  The patient understands they will receive post-surgical care and follow-up from the ASC physician.
Unique Flap 2 Name: Peng Flap
Consent (Nose)/Introductory Paragraph: The rationale for Mohs was explained to the patient and consent was obtained. The risks, benefits and alternatives to therapy were discussed in detail. Specifically, the risks of nasal deformity, changes in the flow of air through the nose, infection, scarring, bleeding, prolonged wound healing, incomplete removal, allergy to anesthesia, nerve injury and recurrence were addressed. Prior to the procedure, the treatment site was clearly identified and confirmed by the patient. All components of Universal Protocol/PAUSE Rule completed.
Ear Wedge Repair Text: A wedge excision was completed by carrying down an excision through the full thickness of the ear and cartilage with an inward facing Burow's triangle. The wound was then closed in a layered fashion.
Mohs Case Number: [id number]
O-T Plasty Text: The defect edges were debeveled with a #15 scalpel blade.  Given the location of the defect, shape of the defect and the proximity to free margins an O-T plasty was deemed most appropriate.  Using a sterile surgical marker, an appropriate O-T plasty was drawn incorporating the defect and placing the expected incisions within the relaxed skin tension lines where possible.    The area thus outlined was incised deep to adipose tissue with a #15 scalpel blade.  The skin margins were undermined to an appropriate distance in all directions utilizing iris scissors.
Purse String (Intermediate) Text: Given the location of the defect and the characteristics of the surrounding skin a purse string intermediate closure was deemed most appropriate.  Undermining was performed circumfirentially around the surgical defect.  A purse string suture was then placed and tightened.
Unna Boot Text: An Unna boot was placed to help immobilize the limb and facilitate more rapid healing.
Consent Type: Consent 1 (Standard)
Referred To Oculoplastics For Closure Text (Leave Blank If You Do Not Want): After obtaining clear surgical margins the patient was sent to oculoplastics for surgical repair.  The patient understands they will receive post-surgical care and follow-up from the referring physician's office.
Helical Rim Advancement Flap Text: The defect edges were debeveled with a #15 blade scalpel.  Given the location of the defect and the proximity to free margins (helical rim) a double helical rim advancement flap was deemed most appropriate.  Using a sterile surgical marker, the appropriate advancement flaps were drawn incorporating the defect and placing the expected incisions between the helical rim and antihelix where possible.  The area thus outlined was incised through and through with a #15 scalpel blade.  With a skin hook and iris scissors, the flaps were gently and sharply undermined and freed up.
Stage 2: Additional Anesthesia Type: 1% lidocaine with 1:100,000 epinephrine and 408mcg clindamycin/ml and a 1:10 solution of 8.4% sodium bicarbonate
Include Size Of Lesion In Location Indication Statement: Yes
Lazy S Intermediate Repair Preamble Text (Leave Blank If You Do Not Want): Undermining was performed with blunt dissection.
Hatchet Flap Text: The defect edges were debeveled with a #15 scalpel blade.  Given the location of the defect, shape of the defect and the proximity to free margins a hatchet flap based from the glabella was deemed most appropriate.  Using a sterile surgical marker, an appropriate glabellar hatchet flap was drawn incorporating the defect and placing the expected incisions within the relaxed skin tension lines where possible.    The area thus outlined was incised deep to adipose tissue with a #15 scalpel blade.  The skin margins were undermined to an appropriate distance in all directions utilizing iris scissors.
Dermal Autograft Text: The defect edges were debeveled with a #15 scalpel blade.  Given the location of the defect, shape of the defect and the proximity to free margins a dermal autograft was deemed most appropriate.  Using a sterile surgical marker, the primary defect shape was transferred to the donor site. The area thus outlined was incised deep to adipose tissue with a #15 scalpel blade.  The harvested graft was then trimmed of adipose and epidermal tissue until only dermis was left.  The skin graft was then placed in the primary defect and oriented appropriately.
Burow's Advancement Flap Text: The defect edges were debeveled with a #15 scalpel blade.  Given the location of the defect and the proximity to free margins a Burow's advancement flap was deemed most appropriate.  Using a sterile surgical marker, the appropriate advancement flap was drawn incorporating the defect and placing the expected incisions within the relaxed skin tension lines where possible.    The area thus outlined was incised deep to adipose tissue with a #15 scalpel blade.  The skin margins were undermined to an appropriate distance in all directions utilizing iris scissors.
Number Of Stages: 2
Secondary Defect Width In Cm (Required For Flaps): 1.5
Wound Care (No Sutures): Petrolatum
W Plasty Text: The lesion was extirpated to the level of the fat with a #15 scalpel blade.  Given the location of the defect, shape of the defect and the proximity to free margins a W-plasty was deemed most appropriate for repair.  Using a sterile surgical marker, the appropriate transposition arms of the W-plasty were drawn incorporating the defect and placing the expected incisions within the relaxed skin tension lines where possible.    The area thus outlined was incised deep to adipose tissue with a #15 scalpel blade.  The skin margins were undermined to an appropriate distance in all directions utilizing iris scissors.  The opposing transposition arms were then transposed into place in opposite direction and anchored with interrupted buried subcutaneous sutures.
Consent (Temporal Branch)/Introductory Paragraph: The rationale for Mohs was explained to the patient and consent was obtained. The risks, benefits and alternatives to therapy were discussed in detail. Specifically, the risks of damage to the temporal branch of the facial nerve, infection, scarring, bleeding, prolonged wound healing, incomplete removal, allergy to anesthesia, and recurrence were addressed. Prior to the procedure, the treatment site was clearly identified and confirmed by the patient. All components of Universal Protocol/PAUSE Rule completed.
Double Island Pedicle Flap Text: The defect edges were debeveled with a #15 scalpel blade.  Given the location of the defect, shape of the defect and the proximity to free margins a double island pedicle advancement flap was deemed most appropriate.  Using a sterile surgical marker, an appropriate advancement flap was drawn incorporating the defect, outlining the appropriate donor tissue and placing the expected incisions within the relaxed skin tension lines where possible.    The area thus outlined was incised deep to adipose tissue with a #15 scalpel blade.  The skin margins were undermined to an appropriate distance in all directions around the primary defect and laterally outward around the island pedicle utilizing iris scissors.  There was minimal undermining beneath the pedicle flap.
Referred To Otolaryngology For Closure Text (Leave Blank If You Do Not Want): After obtaining clear surgical margins the patient was sent to otolaryngology for surgical repair.  The patient understands they will receive post-surgical care and follow-up from the referring physician's office.
Deep Sutures: 5-0 Vicryl
Trilobed Flap Text: The defect edges were debeveled with a #15 scalpel blade.  Given the location of the defect and the proximity to free margins a trilobed flap was deemed most appropriate.  Using a sterile surgical marker, an appropriate trilobed flap drawn around the defect.    The area thus outlined was incised deep to adipose tissue with a #15 scalpel blade.  The skin margins were undermined to an appropriate distance in all directions utilizing iris scissors.
Mohs Method Verbiage: An incision at a 45 degree angle following the standard Mohs approach was done and the specimen was harvested as a microscopic controlled layer.
Consent 1/Introductory Paragraph: The rationale for Mohs was explained to the patient and consent was obtained. The risks, benefits and alternatives to therapy were discussed in detail. Specifically, the risks of infection, scarring, bleeding, prolonged wound healing, incomplete removal, allergy to anesthesia, nerve injury and recurrence were addressed. Prior to the procedure, the treatment site was clearly identified and confirmed by the patient. All components of Universal Protocol/PAUSE Rule completed.
Donor Site Anesthesia Type: same as repair anesthesia
Flap Type: Burow's Advancement Flap
Repair Anesthesia Method: local infiltration
Melolabial Transposition Flap Text: The defect edges were debeveled with a #15 scalpel blade.  Given the location of the defect and the proximity to free margins a melolabial flap was deemed most appropriate.  Using a sterile surgical marker, an appropriate melolabial transposition flap was drawn incorporating the defect.    The area thus outlined was incised deep to adipose tissue with a #15 scalpel blade.  The skin margins were undermined to an appropriate distance in all directions utilizing iris scissors.
Graft Cartilage Fenestration Text: The cartilage was fenestrated with a 2mm punch biopsy to help facilitate graft survival and healing.
Surgical Defect Length In Cm (Optional): 1.2
Eye Protection Verbiage: Before proceeding with the stage, a plastic scleral shield was inserted. The globe was anesthetized with a few drops of 1% lidocaine with 1:100,000 epinephrine. Then, an appropriate sized scleral shield was chosen and coated with lacrilube ointment. The shield was gently inserted and left in place for the duration of each stage. After the stage was completed, the shield was gently removed.
Tissue Cultured Epidermal Autograft Text: The defect edges were debeveled with a #15 scalpel blade.  Given the location of the defect, shape of the defect and the proximity to free margins a tissue cultured epidermal autograft was deemed most appropriate.  The graft was then trimmed to fit the size of the defect.  The graft was then placed in the primary defect and oriented appropriately.
Anesthesia Type: 1% lidocaine with 1:100,000 epinephrine and a 1:10 solution of 8.4% sodium bicarbonate
Cartilage Graft Text: The defect edges were debeveled with a #15 scalpel blade.  Given the location of the defect, shape of the defect, the fact the defect involved a full thickness cartilage defect a cartilage graft was deemed most appropriate.  An appropriate donor site was identified, cleansed, and anesthetized. The cartilage graft was then harvested and transferred to the recipient site, oriented appropriately and then sutured into place.  The secondary defect was then repaired using a primary closure.
Home Suture Removal Text: Patient was provided instructions on removing sutures and will remove their sutures at home.  If they have any questions or difficulties they will call the office.
Ear Star Wedge Flap Text: The defect edges were debeveled with a #15 blade scalpel.  Given the location of the defect and the proximity to free margins (helical rim) an ear star wedge flap was deemed most appropriate.  Using a sterile surgical marker, the appropriate flap was drawn incorporating the defect and placing the expected incisions between the helical rim and antihelix where possible.  The area thus outlined was incised through and through with a #15 scalpel blade.
Manual Repair Warning Statement: We plan on removing the manually selected variable below in favor of our much easier automatic structured text blocks found in the previous tab. We decided to do this to help make the flow better and give you the full power of structured data. Manual selection is never going to be ideal in our platform and I would encourage you to avoid using manual selection from this point on, especially since I will be sunsetting this feature. It is important that you do one of two things with the customized text below. First, you can save all of the text in a word file so you can have it for future reference. Second, transfer the text to the appropriate area in the Library tab. Lastly, if there is a flap or graft type which we do not have you need to let us know right away so I can add it in before the variable is hidden. No need to panic, we plan to give you roughly 6 months to make the change.
Posterior Auricular Interpolation Flap Text: A decision was made to reconstruct the defect utilizing an interpolation axial flap and a staged reconstruction.  A telfa template was made of the defect.  This telfa template was then used to outline the posterior auricular interpolation flap.  The donor area for the pedicle flap was then injected with anesthesia.  The flap was excised through the skin and subcutaneous tissue down to the layer of the underlying musculature.  The pedicle flap was carefully excised within this deep plane to maintain its blood supply.  The edges of the donor site were undermined.   The donor site was closed in a primary fashion.  The pedicle was then rotated into position and sutured.  Once the tube was sutured into place, adequate blood supply was confirmed with blanching and refill.  The pedicle was then wrapped with xeroform gauze and dressed appropriately with a telfa and gauze bandage to ensure continued blood supply and protect the attached pedicle.
Cheiloplasty (Less Than 50%) Text: A decision was made to reconstruct the defect with a  cheiloplasty.  The defect was undermined extensively.  Additional obicularis oris muscle was excised with a 15 blade scalpel.  The defect was converted into a full thickness wedge, of less than 50% of the vertical height of the lip, to facilite a better cosmetic result.  Small vessels were then tied off with 5-0 monocyrl. The obicularis oris, superficial fascia, adipose and dermis were then reapproximated.  After the deeper layers were approximated the epidermis was reapproximated with particular care given to realign the vermilion border.
Island Pedicle Flap Text: The defect edges were debeveled with a #15 scalpel blade.  Given the location of the defect, shape of the defect and the proximity to free margins an island pedicle advancement flap was deemed most appropriate.  Using a sterile surgical marker, an appropriate advancement flap was drawn incorporating the defect, outlining the appropriate donor tissue and placing the expected incisions within the relaxed skin tension lines where possible.    The area thus outlined was incised deep to adipose tissue with a #15 scalpel blade.  The skin margins were undermined to an appropriate distance in all directions around the primary defect and laterally outward around the island pedicle utilizing iris scissors.  There was minimal undermining beneath the pedicle flap.
Tarsorrhaphy Text: A tarsorrhaphy was performed using Frost sutures.
Previous Accession (Optional): [id number]
V-Y Plasty Text: The defect edges were debeveled with a #15 scalpel blade.  Given the location of the defect, shape of the defect and the proximity to free margins an V-Y advancement flap was deemed most appropriate.  Using a sterile surgical marker, an appropriate advancement flap was drawn incorporating the defect and placing the expected incisions within the relaxed skin tension lines where possible.    The area thus outlined was incised deep to adipose tissue with a #15 scalpel blade.  The skin margins were undermined to an appropriate distance in all directions utilizing iris scissors.
Wound Care: Aquaphor
Stage 2: Number Of Blocks?: 1
Unique Flap 1 Text: A decision was made to reconstruct the defect utilizing a myocutaneous Island pedicle Flap based on the levator labii superioris muscle.  A telfa template was made of the defect.  This telfa template was then used to outline the myocutaneous flap, based along the meilolabial fold.  The donor area for the pedicle flap was then injected with anesthesia.  The flap was excised through the skin and subcutaneous tissue down to the layer of the underlying musculature.  The myocutaneous flap was carefully excised within this deep plane to maintain its blood supply. Based on the muscle. The edges of the donor site were undermined.   The donor site was closed in a primary fashion to the point of transposition.  The pedicle was then transposed into position and sutured.  Once the flap was sutured into place, adequate blood supply was confirmed with blanching and refill.
Primary Defect Width In Cm (Final Defect Size - Required For Flaps/Grafts): 1.1
Consent (Ear)/Introductory Paragraph: The rationale for Mohs was explained to the patient and consent was obtained. The risks, benefits and alternatives to therapy were discussed in detail. Specifically, the risks of ear deformity, infection, scarring, bleeding, prolonged wound healing, incomplete removal, allergy to anesthesia, nerve injury and recurrence were addressed. Prior to the procedure, the treatment site was clearly identified and confirmed by the patient. All components of Universal Protocol/PAUSE Rule completed.
Mauc Instructions: By selecting yes to the question below the MAUC number will be added into the note.  This will be calculated automatically based on the diagnosis chosen, the size entered, the body zone selected (H,M,L) and the specific indications you chose. You will also have the option to override the Mohs AUC if you disagree with the automatically calculated number and this option is found in the Case Summary tab.
Postop Diagnosis: same
Purse String (Simple) Text: Given the location of the defect and the characteristics of the surrounding skin a purse string closure was deemed most appropriate.  Undermining was performed circumfirentially around the surgical defect.  A purse string suture was then placed and tightened.
Consent (Spinal Accessory)/Introductory Paragraph: The rationale for Mohs was explained to the patient and consent was obtained. The risks, benefits and alternatives to therapy were discussed in detail. Specifically, the risks of damage to the spinal accessory nerve, infection, scarring, bleeding, prolonged wound healing, incomplete removal, allergy to anesthesia, and recurrence were addressed. Prior to the procedure, the treatment site was clearly identified and confirmed by the patient. All components of Universal Protocol/PAUSE Rule completed.
Location Indication Override (Is Already Calculated Based On Selected Body Location): Area H
Referred To Mid-Level For Closure Text (Leave Blank If You Do Not Want): After obtaining clear surgical margins the patient was sent to a mid-level provider for surgical repair.  The patient understands they will receive post-surgical care and follow-up from the mid-level provider.
Full Thickness Lip Wedge Repair (Flap) Text: Given the location of the defect and the proximity to free margins a full thickness wedge repair was deemed most appropriate.  Using a sterile surgical marker, the appropriate repair was drawn incorporating the defect and placing the expected incisions perpendicular to the vermilion border.  The vermilion border was also meticulously outlined to ensure appropriate reapproximation during the repair.  The area thus outlined was incised through and through with a #15 scalpel blade.  The muscularis and dermis were reaproximated with deep sutures following hemostasis. Care was taken to realign the vermilion border before proceeding with the superficial closure.  Once the vermilion was realigned the superfical and mucosal closure was finished.
No Residual Tumor Seen Histology Text: There were no malignant cells seen in the sections examined.
Medical Necessity Statement: Based on my medical judgement, Mohs surgery is the most appropriate treatment for this cancer compared to other treatments.
Surgeon Performing Repair (Optional): Veronika
Subsequent Stages Histo Method Verbiage: Using a similar technique to that described above, a thin layer of tissue was removed from all areas where tumor was visible on the previous stage.  The tissue was again oriented, mapped, dyed, and processed as above.
Cheek-To-Nose Interpolation Flap Text: A decision was made to reconstruct the defect utilizing an interpolation axial flap and a staged reconstruction.  A telfa template was made of the defect.  This telfa template was then used to outline the Cheek-To-Nose Interpolation flap.  The donor area for the pedicle flap was then injected with anesthesia.  The flap was excised through the skin and subcutaneous tissue down to the layer of the underlying musculature.  The interpolation flap was carefully excised within this deep plane to maintain its blood supply.  The edges of the donor site were undermined.   The donor site was closed in a primary fashion.  The pedicle was then rotated into position and sutured.  Once the tube was sutured into place, adequate blood supply was confirmed with blanching and refill.  The pedicle was then wrapped with xeroform gauze and dressed appropriately with a telfa and gauze bandage to ensure continued blood supply and protect the attached pedicle.
Complex Repair And Graft Additional Text (Will Appearing After The Standard Complex Repair Text): The complex repair was not sufficient to completely close the primary defect. The remaining additional defect was repaired with the graft mentioned below.
A-T Advancement Flap Text: The defect edges were debeveled with a #15 scalpel blade.  Given the location of the defect, shape of the defect and the proximity to free margins an A-T advancement flap was deemed most appropriate.  Using a sterile surgical marker, an appropriate advancement flap was drawn incorporating the defect and placing the expected incisions within the relaxed skin tension lines where possible.    The area thus outlined was incised deep to adipose tissue with a #15 scalpel blade.  The skin margins were undermined to an appropriate distance in all directions utilizing iris scissors.
Modified Advancement Flap Text: The defect edges were debeveled with a #15 scalpel blade.  Given the location of the defect, shape of the defect and the proximity to free margins a modified advancement flap was deemed most appropriate.  Using a sterile surgical marker, an appropriate advancement flap was drawn incorporating the defect and placing the expected incisions within the relaxed skin tension lines where possible.    The area thus outlined was incised deep to adipose tissue with a #15 scalpel blade.  The skin margins were undermined to an appropriate distance in all directions utilizing iris scissors.
Epidermal Closure: running cuticular
Melolabial Interpolation Flap Text: A decision was made to reconstruct the defect utilizing an interpolation axial flap and a staged reconstruction.  A telfa template was made of the defect.  This telfa template was then used to outline the melolabial interpolation flap.  The donor area for the pedicle flap was then injected with anesthesia.  The flap was excised through the skin and subcutaneous tissue down to the layer of the underlying musculature.  The pedicle flap was carefully excised within this deep plane to maintain its blood supply.  The edges of the donor site were undermined.   The donor site was closed in a primary fashion.  The pedicle was then rotated into position and sutured.  Once the tube was sutured into place, adequate blood supply was confirmed with blanching and refill.  The pedicle was then wrapped with xeroform gauze and dressed appropriately with a telfa and gauze bandage to ensure continued blood supply and protect the attached pedicle.
O-Z Plasty Text: The defect edges were debeveled with a #15 scalpel blade.  Given the location of the defect, shape of the defect and the proximity to free margins an O-Z plasty (double transposition flap) was deemed most appropriate.  Using a sterile surgical marker, the appropriate transposition flaps were drawn incorporating the defect and placing the expected incisions within the relaxed skin tension lines where possible.    The area thus outlined was incised deep to adipose tissue with a #15 scalpel blade.  The skin margins were undermined to an appropriate distance in all directions utilizing iris scissors.  Hemostasis was achieved with electrocautery.  The flaps were then transposed into place, one clockwise and the other counterclockwise, and anchored with interrupted buried subcutaneous sutures.
Keystone Flap Text: The defect edges were debeveled with a #15 scalpel blade.  Given the location of the defect, shape of the defect a keystone flap was deemed most appropriate.  Using a sterile surgical marker, an appropriate keystone flap was drawn incorporating the defect, outlining the appropriate donor tissue and placing the expected incisions within the relaxed skin tension lines where possible. The area thus outlined was incised deep to adipose tissue with a #15 scalpel blade.  The skin margins were undermined to an appropriate distance in all directions around the primary defect and laterally outward around the flap utilizing iris scissors.
Consent 3/Introductory Paragraph: I gave the patient a chance to ask questions they had about the procedure.  Following this I explained the Mohs procedure and consent was obtained. The risks, benefits and alternatives to therapy were discussed in detail. Specifically, the risks of infection, scarring, bleeding, prolonged wound healing, incomplete removal, allergy to anesthesia, nerve injury and recurrence were addressed. Prior to the procedure, the treatment site was clearly identified and confirmed by the patient. All components of Universal Protocol/PAUSE Rule completed.
Mohs Histo Method Verbiage: Each section was then chromacoded and processed in the Mohs lab using the Mohs protocol and submitted for frozen section.
Island Pedicle Flap-Requiring Vessel Identification Text: The defect edges were debeveled with a #15 scalpel blade.  Given the location of the defect, shape of the defect and the proximity to free margins an island pedicle advancement flap was deemed most appropriate.  Using a sterile surgical marker, an appropriate advancement flap was drawn, based on the axial vessel mentioned above, incorporating the defect, outlining the appropriate donor tissue and placing the expected incisions within the relaxed skin tension lines where possible.    The area thus outlined was incised deep to adipose tissue with a #15 scalpel blade.  The skin margins were undermined to an appropriate distance in all directions around the primary defect and laterally outward around the island pedicle utilizing iris scissors.  There was minimal undermining beneath the pedicle flap.
Partial Purse String (Simple) Text: Given the location of the defect and the characteristics of the surrounding skin a simple purse string closure was deemed most appropriate.  Undermining was performed circumfirentially around the surgical defect.  A purse string suture was then placed and tightened. Wound tension only allowed a partial closure of the circular defect.
Dressing: dry sterile dressing
Consent (Marginal Mandibular)/Introductory Paragraph: The rationale for Mohs was explained to the patient and consent was obtained. The risks, benefits and alternatives to therapy were discussed in detail. Specifically, the risks of damage to the marginal mandibular branch of the facial nerve, infection, scarring, bleeding, prolonged wound healing, incomplete removal, allergy to anesthesia, and recurrence were addressed. Prior to the procedure, the treatment site was clearly identified and confirmed by the patient. All components of Universal Protocol/PAUSE Rule completed.
Secondary Defect Length In Cm (Required For Flaps): 3.1
Closure 4 Information: This tab is for additional flaps and grafts above and beyond our usual structured repairs.  Please note if you enter information here it will not currently bill and you will need to add the billing information manually.
Repair Type: Flap
No Repair - Repaired With Adjacent Surgical Defect Text (Leave Blank If You Do Not Want): After obtaining clear surgical margins the defect was repaired concurrently with another surgical defect which was in close approximation.
Bi-Rhombic Flap Text: The defect edges were debeveled with a #15 scalpel blade.  Given the location of the defect and the proximity to free margins a bi-rhombic flap was deemed most appropriate.  Using a sterile surgical marker, an appropriate rhombic flap was drawn incorporating the defect. The area thus outlined was incised deep to adipose tissue with a #15 scalpel blade.  The skin margins were undermined to an appropriate distance in all directions utilizing iris scissors.
Bilobed Transposition Flap Text: The defect edges were debeveled with a #15 scalpel blade.  Given the location of the defect and the proximity to free margins a bilobed transposition flap was deemed most appropriate.  Using a sterile surgical marker, an appropriate bilobe flap drawn around the defect.    The area thus outlined was incised deep to adipose tissue with a #15 scalpel blade.  The skin margins were undermined to an appropriate distance in all directions utilizing iris scissors.
Referred To Plastics For Closure Text (Leave Blank If You Do Not Want): After obtaining clear surgical margins the patient was sent to plastics for surgical repair.  The patient understands they will receive post-surgical care and follow-up from the referring physician's office.
Area L Indication Text: Tumors in this location are included in Area L (trunk and extremities).  Mohs surgery is indicated for larger tumors, or tumors with aggressive histologic features, in these anatomic locations.
Epidermal Closure Graft Donor Site (Optional): simple interrupted
Rotation Flap Text: The defect edges were debeveled with a #15 scalpel blade.  Given the location of the defect, shape of the defect and the proximity to free margins a rotation flap was deemed most appropriate.  Using a sterile surgical marker, an appropriate rotation flap was drawn incorporating the defect and placing the expected incisions within the relaxed skin tension lines where possible.    The area thus outlined was incised deep to adipose tissue with a #15 scalpel blade.  The skin margins were undermined to an appropriate distance in all directions utilizing iris scissors.
Unique Flap 3 Name: Mercedes Flap
Surgeon/Pathologist Verbiage (Will Incorporate Name Of Surgeon From Intro If Not Blank): operated in two distinct and integrated capacities as the surgeon and pathologist.
Graft Donor Site Epidermal Sutures (Optional): 5-0 Ethibond
Advancement Flap (Double) Text: The defect edges were debeveled with a #15 scalpel blade.  Given the location of the defect and the proximity to free margins a double advancement flap was deemed most appropriate.  Using a sterile surgical marker, the appropriate advancement flaps were drawn incorporating the defect and placing the expected incisions within the relaxed skin tension lines where possible.    The area thus outlined was incised deep to adipose tissue with a #15 scalpel blade.  The skin margins were undermined to an appropriate distance in all directions utilizing iris scissors.
Bilobed Flap Text: The defect edges were debeveled with a #15 scalpel blade.  Given the location of the defect and the proximity to free margins a bilobe flap was deemed most appropriate.  Using a sterile surgical marker, an appropriate bilobe flap drawn around the defect.    The area thus outlined was incised deep to adipose tissue with a #15 scalpel blade.  The skin margins were undermined to an appropriate distance in all directions utilizing iris scissors.
V-Y Flap Text: The defect edges were debeveled with a #15 scalpel blade.  Given the location of the defect, shape of the defect and the proximity to free margins a V-Y flap was deemed most appropriate.  Using a sterile surgical marker, an appropriate advancement flap was drawn incorporating the defect and placing the expected incisions within the relaxed skin tension lines where possible.    The area thus outlined was incised deep to adipose tissue with a #15 scalpel blade.  The skin margins were undermined to an appropriate distance in all directions utilizing iris scissors.
Bcc Infiltrative Histology Text: There were numerous aggregates of basaloid cells demonstrating an infiltrative pattern.
Split-Thickness Skin Graft Text: The defect edges were debeveled with a #15 scalpel blade.  Given the location of the defect, shape of the defect and the proximity to free margins a split thickness skin graft was deemed most appropriate.  Using a sterile surgical marker, the primary defect shape was transferred to the donor site. The split thickness graft was then harvested.  The skin graft was then placed in the primary defect and oriented appropriately.
Crescentic Advancement Flap Text: The defect edges were debeveled with a #15 scalpel blade.  Given the location of the defect and the proximity to free margins a crescentic advancement flap was deemed most appropriate.  Using a sterile surgical marker, the appropriate advancement flap was drawn incorporating the defect and placing the expected incisions within the relaxed skin tension lines where possible.    The area thus outlined was incised deep to adipose tissue with a #15 scalpel blade.  The skin margins were undermined to an appropriate distance in all directions utilizing iris scissors.
Alar Island Pedicle Flap Text: The defect edges were debeveled with a #15 scalpel blade.  Given the location of the defect, shape of the defect and the proximity to the alar rim an island pedicle advancement flap was deemed most appropriate.  Using a sterile surgical marker, an appropriate advancement flap was drawn incorporating the defect, outlining the appropriate donor tissue and placing the expected incisions within the nasal ala running parallel to the alar rim. The area thus outlined was incised with a #15 scalpel blade.  The skin margins were undermined minimally to an appropriate distance in all directions around the primary defect and laterally outward around the island pedicle utilizing iris scissors.  There was minimal undermining beneath the pedicle flap.
H Plasty Text: Given the location of the defect, shape of the defect and the proximity to free margins a H-plasty was deemed most appropriate for repair.  Using a sterile surgical marker, the appropriate advancement arms of the H-plasty were drawn incorporating the defect and placing the expected incisions within the relaxed skin tension lines where possible. The area thus outlined was incised deep to adipose tissue with a #15 scalpel blade. The skin margins were undermined to an appropriate distance in all directions utilizing iris scissors.  The opposing advancement arms were then advanced into place in opposite direction and anchored with interrupted buried subcutaneous sutures.
Consent (Scalp)/Introductory Paragraph: The rationale for Mohs was explained to the patient and consent was obtained. The risks, benefits and alternatives to therapy were discussed in detail. Specifically, the risks of changes in hair growth pattern secondary to repair, infection, scarring, bleeding, prolonged wound healing, incomplete removal, allergy to anesthesia, nerve injury and recurrence were addressed. Prior to the procedure, the treatment site was clearly identified and confirmed by the patient. All components of Universal Protocol/PAUSE Rule completed.
Z Plasty Text: The lesion was extirpated to the level of the fat with a #15 scalpel blade.  Given the location of the defect, shape of the defect and the proximity to free margins a Z-plasty was deemed most appropriate for repair.  Using a sterile surgical marker, the appropriate transposition arms of the Z-plasty were drawn incorporating the defect and placing the expected incisions within the relaxed skin tension lines where possible.    The area thus outlined was incised deep to adipose tissue with a #15 scalpel blade.  The skin margins were undermined to an appropriate distance in all directions utilizing iris scissors.  The opposing transposition arms were then transposed into place in opposite direction and anchored with interrupted buried subcutaneous sutures.
Star Wedge Flap Text: The defect edges were debeveled with a #15 scalpel blade.  Given the location of the defect, shape of the defect and the proximity to free margins a star wedge flap was deemed most appropriate.  Using a sterile surgical marker, an appropriate rotation flap was drawn incorporating the defect and placing the expected incisions within the relaxed skin tension lines where possible. The area thus outlined was incised deep to adipose tissue with a #15 scalpel blade.  The skin margins were undermined to an appropriate distance in all directions utilizing iris scissors.
Epidermal Autograft Text: The defect edges were debeveled with a #15 scalpel blade.  Given the location of the defect, shape of the defect and the proximity to free margins an epidermal autograft was deemed most appropriate.  Using a sterile surgical marker, the primary defect shape was transferred to the donor site. The epidermal graft was then harvested.  The skin graft was then placed in the primary defect and oriented appropriately.
Skin Substitute Text: The defect edges were debeveled with a #15 scalpel blade.  Given the location of the defect, shape of the defect and the proximity to free margins a skin substitute graft was deemed most appropriate.  The graft material was trimmed to fit the size of the defect. The graft was then placed in the primary defect and oriented appropriately.
Dorsal Nasal Flap Text: The defect edges were debeveled with a #15 scalpel blade.  Given the location of the defect and the proximity to free margins a dorsal nasal flap,based upon the glabellar folds, was deemed most appropriate.  Using a sterile surgical marker, an appropriate dorsal nasal flap was drawn around the defect.    The area thus outlined was incised deep to adipose tissue with a #15 scalpel blade.  The skin margins were undermined to an appropriate distance in all directions utilizing iris scissors.
Consent (Lip)/Introductory Paragraph: The rationale for Mohs was explained to the patient and consent was obtained. The risks, benefits and alternatives to therapy were discussed in detail. Specifically, the risks of lip deformity, changes in the oral aperture, infection, scarring, bleeding, prolonged wound healing, incomplete removal, allergy to anesthesia, nerve injury and recurrence were addressed. Prior to the procedure, the treatment site was clearly identified and confirmed by the patient. All components of Universal Protocol/PAUSE Rule completed.
Cheiloplasty (Complex) Text: A decision was made to reconstruct the defect with a  cheiloplasty.  The defect was undermined extensively.  Additional obicularis oris muscle was excised with a 15 blade scalpel.  The defect was converted into a full thickness wedge to facilite a better cosmetic result.  Small vessels were then tied off with 5-0 monocyrl. The obicularis oris, superficial fascia, adipose and dermis were then reapproximated.  After the deeper layers were approximated the epidermis was reapproximated with particular care given to realign the vermilion border.
Suture Removal: 7 days
Estimated Blood Loss (Cc): less than 5 cc
Secondary Intention Text (Leave Blank If You Do Not Want): The defect will heal with secondary intention.
Unique Flap 4 Text: The defect edges were debeveled with a #15 scalpel blade.  Given the location of the defect and the proximity to free margins a Banner transposition flap was deemed most appropriate.  Using a sterile surgical marker, an appropriate Banner transposition flap was drawn incorporating the defect.    The area thus outlined was incised deep to adipose tissue with a #15 scalpel blade.  The skin margins were undermined to an appropriate distance in all directions utilizing iris scissors.
Cheek Interpolation Flap Text: A decision was made to reconstruct the defect utilizing an interpolation axial flap and a staged reconstruction.  A telfa template was made of the defect.  This telfa template was then used to outline the Cheek Interpolation flap.  The donor area for the pedicle flap was then injected with anesthesia.  The flap was excised through the skin and subcutaneous tissue down to the layer of the underlying musculature.  The interpolation flap was carefully excised within this deep plane to maintain its blood supply.  The edges of the donor site were undermined.   The donor site was closed in a primary fashion.  The pedicle was then rotated into position and sutured.  Once the tube was sutured into place, adequate blood supply was confirmed with blanching and refill.  The pedicle was then wrapped with xeroform gauze and dressed appropriately with a telfa and gauze bandage to ensure continued blood supply and protect the attached pedicle.
Hemostasis: Electrocautery
Consent (Near Eyelid Margin)/Introductory Paragraph: The rationale for Mohs was explained to the patient and consent was obtained. The risks, benefits and alternatives to therapy were discussed in detail. Specifically, the risks of ectropion or eyelid deformity, infection, scarring, bleeding, prolonged wound healing, incomplete removal, allergy to anesthesia, nerve injury and recurrence were addressed. Prior to the procedure, the treatment site was clearly identified and confirmed by the patient. All components of Universal Protocol/PAUSE Rule completed.
Mohs Rapid Report Verbiage: The area of clinically evident tumor was marked with skin marking ink and appropriately hatched.  The initial incision was made following the Mohs approach through the skin.  The specimen was taken to the lab, divided into the necessary number of pieces, chromacoded and processed according to the Mohs protocol.  This was repeated in successive stages until a tumor free defect was achieved.
Alternatives Discussed Intro (Do Not Add Period): I discussed alternative treatments to Mohs surgery and specifically discussed the risks and benefits of
Same Histology In Subsequent Stages Text: The pattern and morphology of the tumor is as described in the first stage.
Unique Flap 4 Name: Banner Flap
Repair Performed By Another Provider Text (Leave Blank If You Do Not Want): After obtaining clear surgical margins the defect was repaired by another provider.
Epidermal Sutures: 5-0 Ethilon
Initial Size Of Lesion: 0.7
Mastoid Interpolation Flap Text: A decision was made to reconstruct the defect utilizing an interpolation axial flap and a staged reconstruction.  A telfa template was made of the defect.  This telfa template was then used to outline the mastoid interpolation flap.  The donor area for the pedicle flap was then injected with anesthesia.  The flap was excised through the skin and subcutaneous tissue down to the layer of the underlying musculature.  The pedicle flap was carefully excised within this deep plane to maintain its blood supply.  The edges of the donor site were undermined.   The donor site was closed in a primary fashion.  The pedicle was then rotated into position and sutured.  Once the tube was sutured into place, adequate blood supply was confirmed with blanching and refill.  The pedicle was then wrapped with xeroform gauze and dressed appropriately with a telfa and gauze bandage to ensure continued blood supply and protect the attached pedicle.
Graft Donor Site Bandage (Optional-Leave Blank If You Don't Want In Note): Aquaphor and telefa placed on wound. Pressure dressing applied to donor site
Island Pedicle Flap With Canthal Suspension Text: The defect edges were debeveled with a #15 scalpel blade.  Given the location of the defect, shape of the defect and the proximity to free margins an island pedicle advancement flap was deemed most appropriate.  Using a sterile surgical marker, an appropriate advancement flap was drawn incorporating the defect, outlining the appropriate donor tissue and placing the expected incisions within the relaxed skin tension lines where possible. The area thus outlined was incised deep to adipose tissue with a #15 scalpel blade.  The skin margins were undermined to an appropriate distance in all directions around the primary defect and laterally outward around the island pedicle utilizing iris scissors.  There was minimal undermining beneath the pedicle flap. A suspension suture was placed in the canthal tendon to prevent tension and prevent ectropion.
Complex Repair And Flap Additional Text (Will Appearing After The Standard Complex Repair Text): The complex repair was not sufficient to completely close the primary defect. The remaining additional defect was repaired with the flap mentioned below.
Bilateral Helical Rim Advancement Flap Text: The defect edges were debeveled with a #15 blade scalpel.  Given the location of the defect and the proximity to free margins (helical rim) a bilateral helical rim advancement flap was deemed most appropriate.  Using a sterile surgical marker, the appropriate advancement flaps were drawn incorporating the defect and placing the expected incisions between the helical rim and antihelix where possible.  The area thus outlined was incised through and through with a #15 scalpel blade.  With a skin hook and iris scissors, the flaps were gently and sharply undermined and freed up.
Advancement-Rotation Flap Text: The defect edges were debeveled with a #15 scalpel blade.  Given the location of the defect, shape of the defect and the proximity to free margins an advancement-rotation flap was deemed most appropriate.  Using a sterile surgical marker, an appropriate flap was drawn incorporating the defect and placing the expected incisions within the relaxed skin tension lines where possible. The area thus outlined was incised deep to adipose tissue with a #15 scalpel blade.  The skin margins were undermined to an appropriate distance in all directions utilizing iris scissors.
Mucosal Advancement Flap Text: Given the location of the defect, shape of the defect and the proximity to free margins a mucosal advancement flap was deemed most appropriate. Incisions were made with a 15 blade scalpel in the appropriate fashion along the cutaneous vermilion border and the mucosal lip. The remaining actinically damaged mucosal tissue was excised.  The mucosal advancement flap was then elevated to the gingival sulcus with care taken to preserve the neurovascular structures and advanced into the primary defect. Care was taken to ensure that precise realignment of the vermilion border was achieved.
Rhombic Flap Text: The defect edges were debeveled with a #15 scalpel blade.  Given the location of the defect and the proximity to free margins a rhombic flap was deemed most appropriate.  Using a sterile surgical marker, an appropriate rhombic flap was drawn incorporating the defect.    The area thus outlined was incised deep to adipose tissue with a #15 scalpel blade.  The skin margins were undermined to an appropriate distance in all directions utilizing iris scissors.
Non-Graft Cartilage Fenestration Text: The cartilage was fenestrated with a 2mm punch biopsy to help facilitate healing.
Muscle Hinge Flap Text: The defect edges were debeveled with a #15 scalpel blade.  Given the size, depth and location of the defect and the proximity to free margins a muscle hinge flap was deemed most appropriate.  Using a sterile surgical marker, an appropriate hinge flap was drawn incorporating the defect. The area thus outlined was incised with a #15 scalpel blade.  The skin margins were undermined to an appropriate distance in all directions utilizing iris scissors.
O-T Advancement Flap Text: The defect edges were debeveled with a #15 scalpel blade.  Given the location of the defect, shape of the defect and the proximity to free margins an O-T advancement flap was deemed most appropriate.  Using a sterile surgical marker, an appropriate advancement flap was drawn incorporating the defect and placing the expected incisions within the relaxed skin tension lines where possible.    The area thus outlined was incised deep to adipose tissue with a #15 scalpel blade.  The skin margins were undermined to an appropriate distance in all directions utilizing iris scissors.
Interpolation Flap Text: A decision was made to reconstruct the defect utilizing an interpolation axial flap and a staged reconstruction.  A telfa template was made of the defect.  This telfa template was then used to outline the interpolation flap.  The donor area for the pedicle flap was then injected with anesthesia.  The flap was excised through the skin and subcutaneous tissue down to the layer of the underlying musculature.  The interpolation flap was carefully excised within this deep plane to maintain its blood supply.  The edges of the donor site were undermined.   The donor site was closed in a primary fashion.  The pedicle was then rotated into position and sutured.  Once the tube was sutured into place, adequate blood supply was confirmed with blanching and refill.  The pedicle was then wrapped with xeroform gauze and dressed appropriately with a telfa and gauze bandage to ensure continued blood supply and protect the attached pedicle.
Consent 2/Introductory Paragraph: Mohs surgery was explained to the patient and consent was obtained. The risks, benefits and alternatives to therapy were discussed in detail. Specifically, the risks of infection, scarring, bleeding, prolonged wound healing, incomplete removal, allergy to anesthesia, nerve injury and recurrence were addressed. Prior to the procedure, the treatment site was clearly identified and confirmed by the patient. All components of Universal Protocol/PAUSE Rule completed.
Unique Flap 2 Text: A decision was made to reconstruct the defect utilizing a Peng Flap (Bilateral Advancement Rotation Flap). Given the location of the defect and the proximity to free margins, this flap was deemed most appropriate.  Using a sterile surgical marker, the appropriate rotation flaps were drawn incorporating the defect and placing the expected incisions within the relaxed skin tension lines where possible.    The area thus outlined was incised deep to adipose tissue with a #15 scalpel blade.  The skin margins were undermined to an appropriate distance in all directions utilizing iris scissors.
Inflammation Suggestive Of Cancer Camouflage Histology Text: There was a dense lymphocytic infiltrate which prevented adequate histologic evaluation of adjacent structures.
Localized Dermabrasion With Wire Brush Text: The patient was draped in routine manner.  Localized dermabrasion using 3 x 17 mm wire brush was performed in routine manner to papillary dermis. This spot dermabrasion is being performed to complete skin cancer reconstruction. It also will eliminate the other sun damaged precancerous cells that are known to be part of the regional effect of a lifetime's worth of sun exposure. This localized dermabrasion is therapeutic and should not be considered cosmetic in any regard.
Composite Graft Text: The defect edges were debeveled with a #15 scalpel blade.  Given the location of the defect, shape of the defect, the proximity to free margins and the fact the defect was full thickness a composite graft was deemed most appropriate.  The defect was outline and then transferred to the donor site.  A full thickness graft was then excised from the donor site. The graft was then placed in the primary defect, oriented appropriately and then sutured into place.  The secondary defect was then repaired using a primary closure.
Graft Basting Suture (Optional): 5-0 Fast Absorbing Gut
Xenograft Text: The defect edges were debeveled with a #15 scalpel blade.  Given the location of the defect, shape of the defect and the proximity to free margins a xenograft was deemed most appropriate.  The graft was then trimmed to fit the size of the defect.  The graft was then placed in the primary defect and oriented appropriately.
Paramedian Forehead Flap Text: A decision was made to reconstruct the defect utilizing an interpolation axial flap and a staged reconstruction.  A telfa template was made of the defect.  This telfa template was then used to outline the paramedian forehead pedicle flap.  The donor area for the pedicle flap was then injected with anesthesia.  The flap was excised through the skin and subcutaneous tissue down to the layer of the underlying musculature.  The pedicle flap was carefully excised within this deep plane to maintain its blood supply.  The edges of the donor site were undermined.   The donor site was closed in a primary fashion.  The pedicle was then rotated into position and sutured.  Once the tube was sutured into place, adequate blood supply was confirmed with blanching and refill.  The pedicle was then wrapped with xeroform gauze and dressed appropriately with a telfa and gauze bandage to ensure continued blood supply and protect the attached pedicle.
Unique Flap 1 Name: Myocutaneous Island pedicle Flap
Post-Care Instructions: I reviewed with the patient in detail post-care instructions. Patient is not to engage in any heavy lifting, exercise, or swimming for the next 14 days. Should the patient develop any fevers, chills, bleeding, severe pain patient will contact the office immediately.
Ftsg Text: The defect edges were debeveled with a #15 scalpel blade.  Given the location of the defect, shape of the defect and the proximity to free margins a full thickness skin graft was deemed most appropriate.  Using a sterile surgical marker, the primary defect shape was transferred to the donor site. The area thus outlined was incised deep to adipose tissue with a #15 scalpel blade.  The harvested graft was then trimmed of adipose tissue until only dermis and epidermis was left.  The skin margins of the secondary defect were undermined to an appropriate distance in all directions utilizing iris scissors.  The secondary defect was closed with interrupted buried subcutaneous sutures.  The skin edges were then re-apposed with running  sutures.  The skin graft was then placed in the primary defect and oriented appropriately.
S Plasty Text: Given the location and shape of the defect, and the orientation of relaxed skin tension lines, an S-plasty was deemed most appropriate for repair.  Using a sterile surgical marker, the appropriate outline of the S-plasty was drawn, incorporating the defect and placing the expected incisions within the relaxed skin tension lines where possible.  The area thus outlined was incised deep to adipose tissue with a #15 scalpel blade.  The skin margins were undermined to an appropriate distance in all directions utilizing iris scissors. The skin flaps were advanced over the defect.  The opposing margins were then approximated with interrupted buried subcutaneous sutures.
Closure 2 Information: This tab is for additional flaps and grafts, including complex repair and grafts and complex repair and flaps. You can also specify a different location for the additional defect, if the location is the same you do not need to select a new one. We will insert the automated text for the repair you select below just as we do for solitary flaps and grafts. Please note that at this time if you select a location with a different insurance zone you will need to override the ICD10 and CPT if appropriate.
Area H Indication Text: Tumors in this location are included in Area H (eyelids, eyebrows, nose, lips, chin, ear, pre-auricular, post-auricular, temple, genitalia, hands, feet, ankles and areola).  Tissue conservation is critical in these anatomic locations.

## 2018-06-26 ENCOUNTER — APPOINTMENT (RX ONLY)
Dept: URBAN - METROPOLITAN AREA CLINIC 36 | Facility: CLINIC | Age: 78
Setting detail: DERMATOLOGY
End: 2018-06-26

## 2018-06-26 DIAGNOSIS — Z48.02 ENCOUNTER FOR REMOVAL OF SUTURES: ICD-10-CM

## 2018-06-26 PROCEDURE — ? SUTURE REMOVAL (GLOBAL PERIOD)

## 2018-06-26 PROCEDURE — 99024 POSTOP FOLLOW-UP VISIT: CPT

## 2018-06-26 ASSESSMENT — LOCATION SIMPLE DESCRIPTION DERM: LOCATION SIMPLE: LEFT NOSE

## 2018-06-26 ASSESSMENT — LOCATION ZONE DERM: LOCATION ZONE: NOSE

## 2018-06-26 ASSESSMENT — LOCATION DETAILED DESCRIPTION DERM: LOCATION DETAILED: LEFT NASAL SIDEWALL

## 2018-06-26 NOTE — PROCEDURE: SUTURE REMOVAL (GLOBAL PERIOD)
Detail Level: Detailed
Add 70567 Cpt? (Important Note: In 2017 The Use Of 86610 Is Being Tracked By Cms To Determine Future Global Period Reimbursement For Global Periods): yes

## 2018-09-17 ENCOUNTER — APPOINTMENT (RX ONLY)
Dept: URBAN - METROPOLITAN AREA CLINIC 4 | Facility: CLINIC | Age: 78
Setting detail: DERMATOLOGY
End: 2018-09-17

## 2018-09-17 DIAGNOSIS — Z09 ENCOUNTER FOR FOLLOW-UP EXAMINATION AFTER COMPLETED TREATMENT FOR CONDITIONS OTHER THAN MALIGNANT NEOPLASM: ICD-10-CM

## 2018-09-17 PROBLEM — C44.519 BASAL CELL CARCINOMA OF SKIN OF OTHER PART OF TRUNK: Status: ACTIVE | Noted: 2018-09-17

## 2018-09-17 PROBLEM — C44.719 BASAL CELL CARCINOMA OF SKIN OF LEFT LOWER LIMB, INCLUDING HIP: Status: ACTIVE | Noted: 2018-09-17

## 2018-09-17 PROCEDURE — ? OBSERVATION

## 2018-09-17 PROCEDURE — 99213 OFFICE O/P EST LOW 20 MIN: CPT | Mod: 24

## 2018-09-17 ASSESSMENT — LOCATION ZONE DERM
LOCATION ZONE: LEG
LOCATION ZONE: TRUNK

## 2018-09-17 ASSESSMENT — LOCATION SIMPLE DESCRIPTION DERM
LOCATION SIMPLE: CHEST
LOCATION SIMPLE: LEFT PRETIBIAL REGION

## 2018-09-17 ASSESSMENT — LOCATION DETAILED DESCRIPTION DERM
LOCATION DETAILED: LEFT MEDIAL SUPERIOR CHEST
LOCATION DETAILED: LEFT DISTAL PRETIBIAL REGION

## 2018-09-17 NOTE — HPI: MEDICATION (IMIQUIMOD)
Is This A New Presentation, Or A Follow-Up?: Follow Up Imiquimod Therapy
How Severe Are The Lesions?: mild

## 2018-09-24 ENCOUNTER — APPOINTMENT (RX ONLY)
Dept: URBAN - METROPOLITAN AREA CLINIC 36 | Facility: CLINIC | Age: 78
Setting detail: DERMATOLOGY
End: 2018-09-24

## 2018-09-24 DIAGNOSIS — Z48.817 ENCOUNTER FOR SURGICAL AFTERCARE FOLLOWING SURGERY ON THE SKIN AND SUBCUTANEOUS TISSUE: ICD-10-CM

## 2018-09-24 PROCEDURE — 99024 POSTOP FOLLOW-UP VISIT: CPT

## 2018-09-24 PROCEDURE — ? POST-OP WOUND CHECK

## 2018-09-24 ASSESSMENT — LOCATION SIMPLE DESCRIPTION DERM: LOCATION SIMPLE: LEFT NOSE

## 2018-09-24 ASSESSMENT — LOCATION ZONE DERM: LOCATION ZONE: NOSE

## 2018-09-24 ASSESSMENT — LOCATION DETAILED DESCRIPTION DERM: LOCATION DETAILED: LEFT NASAL SIDEWALL

## 2018-09-24 NOTE — PROCEDURE: POST-OP WOUND CHECK
Add 86420 Cpt? (Important Note: In 2017 The Use Of 61190 Is Being Tracked By Cms To Determine Future Global Period Reimbursement For Global Periods): yes
Detail Level: Detailed

## 2018-11-27 ENCOUNTER — APPOINTMENT (RX ONLY)
Dept: URBAN - METROPOLITAN AREA CLINIC 4 | Facility: CLINIC | Age: 78
Setting detail: DERMATOLOGY
End: 2018-11-27

## 2018-11-27 DIAGNOSIS — L82.1 OTHER SEBORRHEIC KERATOSIS: ICD-10-CM

## 2018-11-27 DIAGNOSIS — L81.4 OTHER MELANIN HYPERPIGMENTATION: ICD-10-CM

## 2018-11-27 DIAGNOSIS — L57.0 ACTINIC KERATOSIS: ICD-10-CM

## 2018-11-27 PROBLEM — C44.519 BASAL CELL CARCINOMA OF SKIN OF OTHER PART OF TRUNK: Status: ACTIVE | Noted: 2018-11-27

## 2018-11-27 PROBLEM — C44.719 BASAL CELL CARCINOMA OF SKIN OF LEFT LOWER LIMB, INCLUDING HIP: Status: ACTIVE | Noted: 2018-11-27

## 2018-11-27 PROBLEM — C44.311 BASAL CELL CARCINOMA OF SKIN OF NOSE: Status: ACTIVE | Noted: 2018-11-27

## 2018-11-27 PROBLEM — D48.5 NEOPLASM OF UNCERTAIN BEHAVIOR OF SKIN: Status: ACTIVE | Noted: 2018-11-27

## 2018-11-27 PROCEDURE — 17000 DESTRUCT PREMALG LESION: CPT

## 2018-11-27 PROCEDURE — 99212 OFFICE O/P EST SF 10 MIN: CPT | Mod: 25

## 2018-11-27 PROCEDURE — 17003 DESTRUCT PREMALG LES 2-14: CPT

## 2018-11-27 PROCEDURE — ? BIOPSY BY SHAVE METHOD

## 2018-11-27 PROCEDURE — 11100: CPT | Mod: 59

## 2018-11-27 PROCEDURE — ? LIQUID NITROGEN

## 2018-11-27 PROCEDURE — ? OBSERVATION

## 2018-11-27 ASSESSMENT — LOCATION ZONE DERM
LOCATION ZONE: FACE
LOCATION ZONE: NOSE
LOCATION ZONE: LEG

## 2018-11-27 ASSESSMENT — LOCATION SIMPLE DESCRIPTION DERM
LOCATION SIMPLE: LEFT CHEEK
LOCATION SIMPLE: RIGHT PRETIBIAL REGION
LOCATION SIMPLE: NOSE

## 2018-11-27 ASSESSMENT — LOCATION DETAILED DESCRIPTION DERM
LOCATION DETAILED: LEFT INFERIOR CENTRAL MALAR CHEEK
LOCATION DETAILED: RIGHT LATERAL PROXIMAL PRETIBIAL REGION
LOCATION DETAILED: NASAL SUPRATIP
LOCATION DETAILED: LEFT CENTRAL MALAR CHEEK
LOCATION DETAILED: RIGHT DISTAL PRETIBIAL REGION

## 2018-11-27 NOTE — PROCEDURE: BIOPSY BY SHAVE METHOD
Anesthesia Volume In Cc: 0.5
Billing Type: Third-Party Bill
Electrodesiccation And Curettage Text: The wound bed was treated with electrodesiccation and curettage after the biopsy was performed.
Biopsy Type: H and E
Post-Care Instructions: I reviewed with the patient in detail post-care instructions. Patient is to keep the biopsy site dry overnight, and then apply vasaline twice daily until healed.
Was A Bandage Applied: Yes
Dressing: Band-Aid
Hemostasis: Drysol and Electrocautery
Bill For Surgical Tray: no
Curettage Text: The wound bed was treated with curettage after the biopsy was performed.
Notification Instructions: Patient will be notified of biopsy results. However, patient instructed to call the office if not contacted within 2 weeks.
Anticipated Plan (Based On Presumed Biopsy Results): Imiquimod if + (pt has used and has at home now)
Biopsy Method: Personna blade
Detail Level: Detailed
Consent: Written consent was obtained and risks were reviewed including but not limited to scarring, infection, bleeding, scabbing, incomplete removal, nerve damage and allergy to anesthesia.
Lab: [id number]
Lab Facility: 
Additional Anesthesia Volume In Cc (Will Not Render If 0): 0
Wound Care: Vaseline
Anesthesia Type: 1% lidocaine with epinephrine and a 1:10 solution of 8.4% sodium bicarbonate
Depth Of Biopsy: dermis
Type Of Destruction Used: Curettage
Electrodesiccation Text: The wound bed was treated with electrodesiccation after the biopsy was performed.

## 2019-01-15 ENCOUNTER — PATIENT OUTREACH (OUTPATIENT)
Dept: HEALTH INFORMATION MANAGEMENT | Facility: OTHER | Age: 79
End: 2019-01-15

## 2019-01-15 ENCOUNTER — TELEPHONE (OUTPATIENT)
Dept: HEALTH INFORMATION MANAGEMENT | Facility: OTHER | Age: 79
End: 2019-01-15

## 2019-01-16 NOTE — PROGRESS NOTES
Outcome: scheduled pt for NP & AWV completed pre visit planning     WebIZ Checked & Epic Updated:  yes    [facility] Verified: yes    Attempt # Final

## 2019-04-09 ENCOUNTER — APPOINTMENT (RX ONLY)
Dept: URBAN - METROPOLITAN AREA CLINIC 4 | Facility: CLINIC | Age: 79
Setting detail: DERMATOLOGY
End: 2019-04-09

## 2019-04-09 DIAGNOSIS — Z85.828 PERSONAL HISTORY OF OTHER MALIGNANT NEOPLASM OF SKIN: ICD-10-CM

## 2019-04-09 DIAGNOSIS — Z09 ENCOUNTER FOR FOLLOW-UP EXAMINATION AFTER COMPLETED TREATMENT FOR CONDITIONS OTHER THAN MALIGNANT NEOPLASM: ICD-10-CM

## 2019-04-09 DIAGNOSIS — L82.1 OTHER SEBORRHEIC KERATOSIS: ICD-10-CM

## 2019-04-09 DIAGNOSIS — L81.4 OTHER MELANIN HYPERPIGMENTATION: ICD-10-CM

## 2019-04-09 DIAGNOSIS — D22 MELANOCYTIC NEVI: ICD-10-CM

## 2019-04-09 DIAGNOSIS — D18.0 HEMANGIOMA: ICD-10-CM

## 2019-04-09 PROBLEM — D18.01 HEMANGIOMA OF SKIN AND SUBCUTANEOUS TISSUE: Status: ACTIVE | Noted: 2019-04-09

## 2019-04-09 PROBLEM — D22.5 MELANOCYTIC NEVI OF TRUNK: Status: ACTIVE | Noted: 2019-04-09

## 2019-04-09 PROCEDURE — 99213 OFFICE O/P EST LOW 20 MIN: CPT

## 2019-04-09 PROCEDURE — ? OBSERVATION

## 2019-04-09 PROCEDURE — ? COUNSELING

## 2019-04-09 ASSESSMENT — LOCATION SIMPLE DESCRIPTION DERM
LOCATION SIMPLE: CHEST
LOCATION SIMPLE: RIGHT TEMPLE
LOCATION SIMPLE: ABDOMEN
LOCATION SIMPLE: RIGHT CHEEK
LOCATION SIMPLE: LEFT UPPER BACK
LOCATION SIMPLE: RIGHT UPPER BACK
LOCATION SIMPLE: UPPER BACK
LOCATION SIMPLE: NOSE

## 2019-04-09 ASSESSMENT — LOCATION DETAILED DESCRIPTION DERM
LOCATION DETAILED: NASAL SUPRATIP
LOCATION DETAILED: LEFT SUPERIOR MEDIAL UPPER BACK
LOCATION DETAILED: RIGHT CENTRAL TEMPLE
LOCATION DETAILED: MIDDLE STERNUM
LOCATION DETAILED: RIGHT SUPERIOR MEDIAL UPPER BACK
LOCATION DETAILED: EPIGASTRIC SKIN
LOCATION DETAILED: RIGHT CENTRAL MALAR CHEEK
LOCATION DETAILED: PERIUMBILICAL SKIN
LOCATION DETAILED: INFERIOR THORACIC SPINE

## 2019-04-09 ASSESSMENT — LOCATION ZONE DERM
LOCATION ZONE: FACE
LOCATION ZONE: TRUNK
LOCATION ZONE: NOSE

## 2019-05-16 ENCOUNTER — APPOINTMENT (OUTPATIENT)
Dept: MEDICAL GROUP | Facility: MEDICAL CENTER | Age: 79
End: 2019-05-16
Payer: MEDICARE

## 2019-06-29 ENCOUNTER — OFFICE VISIT (OUTPATIENT)
Dept: URGENT CARE | Facility: CLINIC | Age: 79
End: 2019-06-29
Payer: MEDICARE

## 2019-06-29 VITALS
HEART RATE: 68 BPM | HEIGHT: 69 IN | TEMPERATURE: 98.2 F | BODY MASS INDEX: 30.36 KG/M2 | SYSTOLIC BLOOD PRESSURE: 130 MMHG | DIASTOLIC BLOOD PRESSURE: 84 MMHG | OXYGEN SATURATION: 96 % | RESPIRATION RATE: 16 BRPM | WEIGHT: 205 LBS

## 2019-06-29 DIAGNOSIS — R21 ATYPICAL RASH: ICD-10-CM

## 2019-06-29 PROCEDURE — 99214 OFFICE O/P EST MOD 30 MIN: CPT | Performed by: NURSE PRACTITIONER

## 2019-06-29 RX ORDER — VALACYCLOVIR HYDROCHLORIDE 1 G/1
1000 TABLET, FILM COATED ORAL 3 TIMES DAILY
Qty: 21 TAB | Refills: 0 | Status: SHIPPED | OUTPATIENT
Start: 2019-06-29 | End: 2019-07-06

## 2019-06-29 NOTE — PROGRESS NOTES
Chief Complaint   Patient presents with   • Rash     x1day, rash on right back side, itchiness       HISTORY OF PRESENT ILLNESS: Patient is a 79 y.o. female who presents to urgent care today with complaints of a rash. Notes that since yesterday she has had a rash to her right side. The rash is tender and itchy. She denies fever, chills, joint pain or malaise. She has not tried any medication for symptom relief.  Denies previous history of the same.    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    Current Outpatient Prescriptions Ordered in [facility]   Medication Sig Dispense Refill   • valacyclovir (VALTREX) 1 GM Tab Take 1 Tab by mouth 3 times a day for 7 days. 21 Tab 0   • acetaminophen (TYLENOL) 500 MG Tab Take 500-1,000 mg by mouth every 6 hours as needed.       No current Epic-ordered facility-administered medications on file.        Past Medical History:   Diagnosis Date   • Breast cancer (HCC)    • Breath shortness     with exercise   • Cancer (HCC)     eye lid and breast    • CATARACT    • Other specified symptom associated with female genital organs        Social History   Substance Use Topics   • Smoking status: Former Smoker     Packs/day: 0.50     Years: 3.00     Types: Cigarettes     Quit date: 1/1/1959   • Smokeless tobacco: Never Used   • Alcohol use 2.4 oz/week     4 Glasses of wine per week      Comment: Occasionally       Family Status   Relation Status   • Mo (Not Specified)   • Sis (Not Specified)   • Sis (Not Specified)   • Sis (Not Specified)     Family History   Problem Relation Age of Onset   • Cancer Mother         breast    • Cancer Sister         breast    • Cancer Sister         breast    • Cancer Sister         breast        ROS:  Review of Systems   Constitutional: Negative for fever,  "chills, weight loss, malaise, and fatigue.   HENT: Negative for ear pain, nosebleeds, congestion, sore throat and neck pain.    Eyes: Negative for vision changes.   Neuro: Negative for headache, sensory changes, weakness, seizure, LOC.   Cardiovascular: Negative for chest pain, palpitations, orthopnea and leg swelling.   Respiratory: Negative for cough, sputum production, shortness of breath and wheezing.   Gastrointestinal: Negative for abdominal pain, nausea, vomiting or diarrhea.   Genitourinary: Negative for dysuria, urgency and frequency.  Musculoskeletal: Negative for falls, neck pain, back pain, joint pain, myalgias.   Skin: Positive for rash.  Negative for diaphoresis.     Exam:  /84 (BP Location: Left arm, Patient Position: Sitting, BP Cuff Size: Adult)   Pulse 68   Temp 36.8 °C (98.2 °F) (Temporal)   Resp 16   Ht 1.745 m (5' 8.7\")   Wt 93 kg (205 lb)   SpO2 96%   General: well-nourished, well-developed female in NAD  Head: normocephalic, atraumatic  Eyes: PERRLA, no conjunctival injection, acuity grossly intact, lids normal.  Ears: normal shape and symmetry, no tenderness, no discharge. External canals are without any significant edema or erythema. Gross auditory acuity is intact.  Nose: symmetrical without tenderness, no discharge.  Mouth/Throat: reasonable hygiene, no erythema, exudates or tonsillar enlargement.  Neck: no masses, range of motion within normal limits, no tracheal deviation. No obvious thyroid enlargement.   Lymph: no cervical adenopathy. No supraclavicular adenopathy.   Neuro: alert and oriented. Cranial nerves 1-12 grossly intact. No sensory deficit.   Cardiovascular: regular rate and rhythm. No edema.  Pulmonary: no distress. Chest is symmetrical with respiration, no wheezes, crackles, or rhonchi.   Musculoskeletal: no clubbing, appropriate muscle tone, gait is stable.  Skin: warm, dry, intact, no clubbing, no cyanosis.  There is a scattered, linear, maculopapular rash to " right side of her abdomen, covering an area of approximately 1 x 3 inches.  No drainage, vesicles, or pustules noted.  Psych: appropriate mood, affect, judgement.         Assessment/Plan:  1. Atypical rash  valacyclovir (VALTREX) 1 GM Tab       Patient is a pleasant 79-year-old female who presents to the clinic today with complaints of a rash.  She is concerned about shingles today.  Due to the pain, location, and linear presentation of the rash, have offered Valtrex but of also discussed potential for contact dermatitis.  I have instructed the patient she may wait 24 hours to start the medication.  In the meantime she may try OTC hydrocortisone cream for symptom relief.  Supportive care, differential diagnoses, and indications for immediate follow-up discussed with patient.   Pathogenesis of diagnosis discussed including typical length and natural progression.   Instructed to return to clinic or nearest emergency department for any change in condition, further concerns, or worsening of symptoms.  Patient states understanding of the plan of care and discharge instructions.  Instructed to make an appointment, for follow up, with her primary care provider.        Please note that this dictation was created using voice recognition software. I have made every reasonable attempt to correct obvious errors, but I expect that there are errors of grammar and possibly content that I did not discover before finalizing the note.      KAY Patterson.

## 2019-09-12 ENCOUNTER — OFFICE VISIT (OUTPATIENT)
Dept: MEDICAL GROUP | Facility: MEDICAL CENTER | Age: 79
End: 2019-09-12
Payer: MEDICARE

## 2019-09-12 VITALS
HEART RATE: 78 BPM | WEIGHT: 202 LBS | SYSTOLIC BLOOD PRESSURE: 122 MMHG | RESPIRATION RATE: 14 BRPM | DIASTOLIC BLOOD PRESSURE: 78 MMHG | HEIGHT: 69 IN | OXYGEN SATURATION: 96 % | TEMPERATURE: 97.8 F | BODY MASS INDEX: 29.92 KG/M2

## 2019-09-12 DIAGNOSIS — N95.1 POSTMENOPAUSAL DISORDER: ICD-10-CM

## 2019-09-12 DIAGNOSIS — I51.81 BROKEN HEART SYNDROME: ICD-10-CM

## 2019-09-12 DIAGNOSIS — N95.9 MENOPAUSAL AND POSTMENOPAUSAL DISORDER: ICD-10-CM

## 2019-09-12 DIAGNOSIS — Z85.3 HISTORY OF BREAST CANCER: ICD-10-CM

## 2019-09-12 DIAGNOSIS — Z13.1 SCREENING FOR DIABETES MELLITUS (DM): ICD-10-CM

## 2019-09-12 DIAGNOSIS — C44.101: ICD-10-CM

## 2019-09-12 DIAGNOSIS — Z12.39 SCREENING FOR BREAST CANCER: ICD-10-CM

## 2019-09-12 DIAGNOSIS — Z13.6 SCREENING FOR ISCHEMIC HEART DISEASE: ICD-10-CM

## 2019-09-12 PROCEDURE — 99203 OFFICE O/P NEW LOW 30 MIN: CPT | Performed by: FAMILY MEDICINE

## 2019-09-12 ASSESSMENT — PATIENT HEALTH QUESTIONNAIRE - PHQ9: CLINICAL INTERPRETATION OF PHQ2 SCORE: 0

## 2019-09-12 NOTE — PROGRESS NOTES
Annual Health Assessment Questions:    1.  Are you currently engaging in any exercise or physical activity? Yes    2.  How would you describe your mood or emotional well-being today? good    3.  Have you had any falls in the last year? No    4.  Have you noticed any problems with your balance or had difficulty walking? No    5.  In the last six months have you experienced any leakage of urine? No    6. DPA/Advanced Directive: Patient has Advanced Directive, but it is not on file. Instructed to bring in a copy to scan into their chart.         This medical record contains text that has been entered with the assistance of computer voice recognition and dictation software.  Therefore, it may contain unintended errors in text, spelling, punctuation, or grammar        Chief Complaint   Patient presents with   • Establish Care     new patient needs new pcp        Ailyn Quentin is a 79 y.o. female here evaluation and management of:    Est care   AHA paperwork    Takes no medications no chronic conditions  Interested in prev lab work   H/o breast cancer in remission   H/o skin cancer in remission         No current outpatient medications on file.     No current facility-administered medications for this visit.      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 at SURGERY SAME DAY [facility] ORS   • DILATION AND CURETTAGE  4/3/2013    Performed by Demetrio Webb M.D. at SURGERY SAME DAY [facility] ORS   • LUMPECTOMY     • OTHER      cataracts   • PB RADIATION THERAPY PLAN SIMPLE        Social History     Tobacco Use   • Smoking status: Former Smoker     Packs/day: 0.50     Years: 3.00     Pack years: 1.50     Types: Cigarettes     Last attempt to quit:  "1959     Years since quittin.7   • Smokeless tobacco: Never Used   Substance Use Topics   • Alcohol use: Yes     Alcohol/week: 2.4 oz     Types: 4 Glasses of wine per week     Comment: Occasionally   • Drug use: No     Family History   Problem Relation Age of Onset   • Cancer Mother         breast    • Cancer Sister         breast    • Cancer Sister         breast    • Cancer Sister         breast            ROS  Currently no skin lesions of concern  + ongoing sadness from her husbands suicide which is discussed at length   all review of system completed and negative except for those listed above     Objective:     /78 (BP Location: Left arm, Patient Position: Sitting, BP Cuff Size: Large adult)   Pulse 78   Temp 36.6 °C (97.8 °F) (Temporal)   Resp 14   Ht 1.753 m (5' 9\")   Wt 91.6 kg (202 lb)   SpO2 96%  Body mass index is 29.83 kg/m².  Physical Exam:        GEN: comfortable, alert and oriented, well nourished, well developed, in no apparent distress   HEENT: NCAT, eyes: pupils equal and reactive, sclera white, EOMIT, good dentition  HEART: limbs warm and well perfused, regular rate, no JVD, no lower extremity edema  LUNGS: speaking in full sentences, not in apparent respiratory distress, no audible wheezes  MSK: normal tone and bulk, no swelling of the joints, gait steady and normal           Assessment and Plan:   The following treatment plan was discussed        Problem List Items Addressed This Visit     Cancer of skin of eyelid     In remission   But does annual skin checks with derm              Relevant Orders    REFERRAL TO DERMATOLOGY    Broken heart syndrome      committed suicide 3 years ago   [facility] ref offered               History of breast cancer     >20 years ago                Other Visit Diagnoses     Screening for breast cancer        Relevant Orders    MA-SCREEN MAMMO W/CAD-BILAT    Screening for diabetes mellitus (DM)        Relevant Orders    Lipid Profile    " Basic Metabolic Panel    Screening for ischemic heart disease        Relevant Orders    Lipid Profile    Basic Metabolic Panel    Postmenopausal disorder        Relevant Orders    DS-BONE DENSITY STUDY (DEXA)    Menopausal and postmenopausal disorder        Relevant Orders    DS-BONE DENSITY STUDY (DEXA)                Instructed to follow up if symptoms worsen or fail to improve, ER/UC precautions discussed as well  This note is my note  Heidi Medrano MD  [facility], [address]   [place] [address]  Phone: [phone]

## 2019-09-12 NOTE — LETTER
[facility]  Heidi Medrano M.D.  [address]  [place] [address]  Fax: [phone]   Authorization for Release/Disclosure of   Protected Health Information   Name: AILYN SAAVEDRA : 1940 SSN: [id number]   Address: [address] [place] [address]  [place] [address] Phone:    [phone] (home)    I authorize the entity listed below to release/disclose the PHI below to:   [facility]/Heidi Medrano M.D. and Heidi Medrano M.D.   Provider or Entity Name:  Dr. Perez [facility]    Address   City, [facility], [facility]   Phone:      Fax:     Reason for request: continuity of care   Information to be released:    [  ] LAST COLONOSCOPY,  including any PATH REPORT and follow-up  [  ] LAST FIT/COLOGUARD RESULT [  ] LAST DEXA  [  ] LAST MAMMOGRAM  [  ] LAST PAP  [  ] LAST LABS [  ] RETINA EXAM REPORT  [  ] IMMUNIZATION RECORDS  [X  ] Release all info      [  ] Check here and initial the line next to each item to release ALL health information INCLUDING  _____ Care and treatment for drug and / or alcohol abuse  _____ HIV testing, infection status, or AIDS  _____ Genetic Testing    DATES OF SERVICE OR TIME PERIOD TO BE DISCLOSED: _____________  I understand and acknowledge that:  * This Authorization may be revoked at any time by you in writing, except if your health information has already been used or disclosed.  * Your health information that will be used or disclosed as a result of you signing this authorization could be re-disclosed by the recipient. If this occurs, your re-disclosed health information may no longer be protected by State or Federal laws.  * You may refuse to sign this Authorization. Your refusal will not affect your ability to obtain treatment.  * This Authorization becomes effective upon signing and will  on (date) __________.      If no date is indicated, this Authorization will  one (1) year from the signature date.    Name: Ailyn Saavedra    Signature:   Date:          9/12/2019       PLEASE FAX REQUESTED RECORDS BACK TO: [phone]

## 2019-10-08 ENCOUNTER — APPOINTMENT (RX ONLY)
Dept: URBAN - METROPOLITAN AREA CLINIC 4 | Facility: CLINIC | Age: 79
Setting detail: DERMATOLOGY
End: 2019-10-08

## 2019-10-08 DIAGNOSIS — L57.0 ACTINIC KERATOSIS: ICD-10-CM

## 2019-10-08 DIAGNOSIS — Z85.828 PERSONAL HISTORY OF OTHER MALIGNANT NEOPLASM OF SKIN: ICD-10-CM

## 2019-10-08 DIAGNOSIS — L82.1 OTHER SEBORRHEIC KERATOSIS: ICD-10-CM

## 2019-10-08 DIAGNOSIS — L81.4 OTHER MELANIN HYPERPIGMENTATION: ICD-10-CM

## 2019-10-08 DIAGNOSIS — D18.0 HEMANGIOMA: ICD-10-CM

## 2019-10-08 DIAGNOSIS — D22 MELANOCYTIC NEVI: ICD-10-CM

## 2019-10-08 PROBLEM — D48.5 NEOPLASM OF UNCERTAIN BEHAVIOR OF SKIN: Status: ACTIVE | Noted: 2019-10-08

## 2019-10-08 PROBLEM — D18.01 HEMANGIOMA OF SKIN AND SUBCUTANEOUS TISSUE: Status: ACTIVE | Noted: 2019-10-08

## 2019-10-08 PROBLEM — D22.5 MELANOCYTIC NEVI OF TRUNK: Status: ACTIVE | Noted: 2019-10-08

## 2019-10-08 PROCEDURE — ? OBSERVATION

## 2019-10-08 PROCEDURE — ? BIOPSY BY SHAVE METHOD

## 2019-10-08 PROCEDURE — 17000 DESTRUCT PREMALG LESION: CPT | Mod: 59

## 2019-10-08 PROCEDURE — ? LIQUID NITROGEN

## 2019-10-08 PROCEDURE — 99212 OFFICE O/P EST SF 10 MIN: CPT | Mod: 25

## 2019-10-08 PROCEDURE — 11102 TANGNTL BX SKIN SINGLE LES: CPT

## 2019-10-08 ASSESSMENT — LOCATION ZONE DERM
LOCATION ZONE: FACE
LOCATION ZONE: NOSE
LOCATION ZONE: TRUNK

## 2019-10-08 ASSESSMENT — LOCATION DETAILED DESCRIPTION DERM
LOCATION DETAILED: INFERIOR THORACIC SPINE
LOCATION DETAILED: RIGHT CENTRAL TEMPLE
LOCATION DETAILED: RIGHT SUPERIOR MEDIAL UPPER BACK
LOCATION DETAILED: PERIUMBILICAL SKIN
LOCATION DETAILED: RIGHT LATERAL SUPERIOR CHEST
LOCATION DETAILED: EPIGASTRIC SKIN
LOCATION DETAILED: LEFT SUPERIOR MEDIAL UPPER BACK
LOCATION DETAILED: NASAL SUPRATIP
LOCATION DETAILED: RIGHT CENTRAL MALAR CHEEK

## 2019-10-08 ASSESSMENT — LOCATION SIMPLE DESCRIPTION DERM
LOCATION SIMPLE: UPPER BACK
LOCATION SIMPLE: CHEST
LOCATION SIMPLE: RIGHT TEMPLE
LOCATION SIMPLE: RIGHT UPPER BACK
LOCATION SIMPLE: LEFT UPPER BACK
LOCATION SIMPLE: NOSE
LOCATION SIMPLE: RIGHT CHEEK
LOCATION SIMPLE: ABDOMEN

## 2019-10-08 NOTE — PROCEDURE: BIOPSY BY SHAVE METHOD
Post-Care Instructions: I reviewed with the patient in detail post-care instructions. Patient is to keep the biopsy site dry overnight, and then apply vasaline twice daily until healed.
Anesthesia Type: 1% lidocaine with epinephrine and a 1:10 solution of 8.4% sodium bicarbonate
Destruction After The Procedure: No
Billing Type: Third-Party Bill
Detail Level: Detailed
Biopsy Method: Personna blade
Lab: [id number]
Curettage Text: The wound bed was treated with curettage after the biopsy was performed.
Notification Instructions: Patient will be notified of biopsy results. However, patient instructed to call the office if not contacted within 2 weeks.
Depth Of Biopsy: dermis
Electrodesiccation Text: The wound bed was treated with electrodesiccation after the biopsy was performed.
Lab Facility: 
Additional Anesthesia Volume In Cc (Will Not Render If 0): 0
Wound Care: Vaseline
Anesthesia Volume In Cc: 0.5
Type Of Destruction Used: Curettage
Dressing: Band-Aid
Electrodesiccation And Curettage Text: The wound bed was treated with electrodesiccation and curettage after the biopsy was performed.
Consent: Written consent was obtained and risks were reviewed including but not limited to scarring, infection, bleeding, scabbing, incomplete removal, nerve damage and allergy to anesthesia.
Biopsy Type: H and E
Was A Bandage Applied: Yes
Hemostasis: Drysol and Electrocautery
Anticipated Plan (Based On Presumed Biopsy Results): imiquimod if + (pateint has used before )

## 2019-10-08 NOTE — PROCEDURE: REASSURANCE
Detail Level: Detailed
Include Location In Plan?: No
Detail Level: Zone
Include Location In Plan?: Yes
Detail Level: Generalized

## 2019-10-14 RX ORDER — IMIQUIMOD 50 MG/G
CREAM TOPICAL
Qty: 6 | Refills: 1 | Status: ERX | COMMUNITY
Start: 2019-10-14

## 2019-10-14 RX ADMIN — IMIQUIMOD: 50 CREAM TOPICAL at 00:00

## 2019-10-15 ENCOUNTER — HOSPITAL ENCOUNTER (OUTPATIENT)
Dept: RADIOLOGY | Facility: MEDICAL CENTER | Age: 79
End: 2019-10-15
Attending: FAMILY MEDICINE
Payer: MEDICARE

## 2019-10-15 DIAGNOSIS — N95.9 MENOPAUSAL AND POSTMENOPAUSAL DISORDER: ICD-10-CM

## 2019-10-15 DIAGNOSIS — Z12.39 SCREENING FOR BREAST CANCER: ICD-10-CM

## 2019-10-15 DIAGNOSIS — N95.1 POSTMENOPAUSAL DISORDER: ICD-10-CM

## 2019-10-15 PROCEDURE — 77080 DXA BONE DENSITY AXIAL: CPT

## 2019-10-15 PROCEDURE — 77063 BREAST TOMOSYNTHESIS BI: CPT

## 2019-10-16 ENCOUNTER — TELEPHONE (OUTPATIENT)
Dept: MEDICAL GROUP | Facility: MEDICAL CENTER | Age: 79
End: 2019-10-16

## 2019-10-16 NOTE — TELEPHONE ENCOUNTER
Phone Number Called: [phone] (home)     Call outcome: left message for patient to call back regarding message below    Message:   ----- Message from Heidi Medrano M.D. sent at 10/16/2019  9:12 AM PDT -----  She could benefit from bone modifying agent like fosamax  If interested schedule appoint

## 2019-10-17 NOTE — TELEPHONE ENCOUNTER
Phone Number Called: [phone] (home)     Call outcome: left message for patient to call back regarding message below    Message: Pt left msg returning call. Called back,no answer.Left msg.

## 2019-10-18 NOTE — TELEPHONE ENCOUNTER
Phone Number Called: [phone] (home)      Call outcome: left message for patient to call back regarding message below     Message: 2 nd attempt

## 2019-10-18 NOTE — TELEPHONE ENCOUNTER
Phone Number Called: [phone] (home)     Call outcome: left message for patient to call back regarding message below    Message: Left detailed message, informing her of the medication Fosamax and if interested in starting a script to call and schedule an appt with Dr. Medrano.

## 2020-01-14 ENCOUNTER — APPOINTMENT (RX ONLY)
Dept: URBAN - METROPOLITAN AREA CLINIC 4 | Facility: CLINIC | Age: 80
Setting detail: DERMATOLOGY
End: 2020-01-14

## 2020-01-14 DIAGNOSIS — Z09 ENCOUNTER FOR FOLLOW-UP EXAMINATION AFTER COMPLETED TREATMENT FOR CONDITIONS OTHER THAN MALIGNANT NEOPLASM: ICD-10-CM | Status: RESOLVED

## 2020-01-14 PROCEDURE — 99212 OFFICE O/P EST SF 10 MIN: CPT

## 2020-01-14 PROCEDURE — ? OBSERVATION

## 2020-01-14 ASSESSMENT — LOCATION ZONE DERM: LOCATION ZONE: FACE

## 2020-01-14 ASSESSMENT — LOCATION SIMPLE DESCRIPTION DERM: LOCATION SIMPLE: LEFT FOREHEAD

## 2020-01-14 ASSESSMENT — LOCATION DETAILED DESCRIPTION DERM: LOCATION DETAILED: LEFT INFERIOR LATERAL FOREHEAD

## 2020-07-10 ENCOUNTER — PATIENT OUTREACH (OUTPATIENT)
Dept: HEALTH INFORMATION MANAGEMENT | Facility: OTHER | Age: 80
End: 2020-07-10

## 2020-07-14 ENCOUNTER — APPOINTMENT (RX ONLY)
Dept: URBAN - METROPOLITAN AREA CLINIC 4 | Facility: CLINIC | Age: 80
Setting detail: DERMATOLOGY
End: 2020-07-14

## 2020-07-14 DIAGNOSIS — D22 MELANOCYTIC NEVI: ICD-10-CM

## 2020-07-14 DIAGNOSIS — L81.4 OTHER MELANIN HYPERPIGMENTATION: ICD-10-CM

## 2020-07-14 DIAGNOSIS — L82.1 OTHER SEBORRHEIC KERATOSIS: ICD-10-CM

## 2020-07-14 DIAGNOSIS — Z85.828 PERSONAL HISTORY OF OTHER MALIGNANT NEOPLASM OF SKIN: ICD-10-CM

## 2020-07-14 DIAGNOSIS — D18.0 HEMANGIOMA: ICD-10-CM

## 2020-07-14 PROBLEM — D22.5 MELANOCYTIC NEVI OF TRUNK: Status: ACTIVE | Noted: 2020-07-14

## 2020-07-14 PROBLEM — D18.01 HEMANGIOMA OF SKIN AND SUBCUTANEOUS TISSUE: Status: ACTIVE | Noted: 2020-07-14

## 2020-07-14 PROCEDURE — ? OBSERVATION

## 2020-07-14 PROCEDURE — 99213 OFFICE O/P EST LOW 20 MIN: CPT

## 2020-07-14 ASSESSMENT — LOCATION SIMPLE DESCRIPTION DERM
LOCATION SIMPLE: NOSE
LOCATION SIMPLE: LEFT FOREHEAD
LOCATION SIMPLE: UPPER BACK
LOCATION SIMPLE: RIGHT UPPER BACK
LOCATION SIMPLE: LEFT UPPER BACK
LOCATION SIMPLE: ABDOMEN
LOCATION SIMPLE: RIGHT TEMPLE

## 2020-07-14 ASSESSMENT — LOCATION DETAILED DESCRIPTION DERM
LOCATION DETAILED: LEFT INFERIOR LATERAL FOREHEAD
LOCATION DETAILED: LEFT SUPERIOR MEDIAL UPPER BACK
LOCATION DETAILED: RIGHT CENTRAL TEMPLE
LOCATION DETAILED: RIGHT SUPERIOR MEDIAL UPPER BACK
LOCATION DETAILED: INFERIOR THORACIC SPINE
LOCATION DETAILED: PERIUMBILICAL SKIN
LOCATION DETAILED: NASAL SUPRATIP
LOCATION DETAILED: EPIGASTRIC SKIN

## 2020-07-14 ASSESSMENT — LOCATION ZONE DERM
LOCATION ZONE: TRUNK
LOCATION ZONE: NOSE
LOCATION ZONE: FACE

## 2020-07-14 NOTE — PROCEDURE: REASSURANCE
Hide Include Location In Plan Question?: No
Detail Level: Generalized
Detail Level: Zone
Include Location In Plan?: Yes

## 2020-08-05 NOTE — PROGRESS NOTES
Outcome: Left Message SCP PA / [facility]    Please transfer to Patient Outreach Team at [phone] when patient returns call.    [facility] Verified: yes    Attempt # 2

## 2021-01-11 DIAGNOSIS — Z23 NEED FOR VACCINATION: ICD-10-CM

## 2021-02-23 NOTE — HPI: FULL BODY SKIN EXAMINATION
no
How Severe Are Your Spot(S)?: mild
What Is The Reason For Today's Visit?: Full Body Skin Examination
What Is The Reason For Today's Visit? (Being Monitored For X): concerning skin lesions on an annual basis

## 2021-08-01 ENCOUNTER — PATIENT MESSAGE (OUTPATIENT)
Dept: HEALTH INFORMATION MANAGEMENT | Facility: OTHER | Age: 81
End: 2021-08-01

## 2021-08-03 ENCOUNTER — APPOINTMENT (OUTPATIENT)
Dept: RADIOLOGY | Facility: MEDICAL CENTER | Age: 81
End: 2021-08-03
Attending: EMERGENCY MEDICINE
Payer: MEDICARE

## 2021-08-03 ENCOUNTER — OFFICE VISIT (OUTPATIENT)
Dept: URGENT CARE | Facility: CLINIC | Age: 81
End: 2021-08-03
Payer: MEDICARE

## 2021-08-03 ENCOUNTER — HOSPITAL ENCOUNTER (OUTPATIENT)
Facility: MEDICAL CENTER | Age: 81
End: 2021-08-05
Attending: EMERGENCY MEDICINE | Admitting: INTERNAL MEDICINE
Payer: MEDICARE

## 2021-08-03 VITALS
HEART RATE: 82 BPM | OXYGEN SATURATION: 95 % | TEMPERATURE: 98.4 F | HEIGHT: 66 IN | RESPIRATION RATE: 17 BRPM | BODY MASS INDEX: 30.37 KG/M2 | SYSTOLIC BLOOD PRESSURE: 128 MMHG | DIASTOLIC BLOOD PRESSURE: 74 MMHG | WEIGHT: 189 LBS

## 2021-08-03 DIAGNOSIS — R07.9 CHEST PAIN, UNSPECIFIED TYPE: ICD-10-CM

## 2021-08-03 DIAGNOSIS — R06.02 SHORTNESS OF BREATH: ICD-10-CM

## 2021-08-03 DIAGNOSIS — R73.9 HYPERGLYCEMIA: ICD-10-CM

## 2021-08-03 DIAGNOSIS — R63.1 POLYDIPSIA: ICD-10-CM

## 2021-08-03 DIAGNOSIS — Z00.00 HEALTH CARE MAINTENANCE: ICD-10-CM

## 2021-08-03 DIAGNOSIS — R53.83 FATIGUE, UNSPECIFIED TYPE: ICD-10-CM

## 2021-08-03 PROBLEM — Z85.3 HISTORY OF BREAST CANCER: Chronic | Status: ACTIVE | Noted: 2019-09-12

## 2021-08-03 LAB
ALBUMIN SERPL BCP-MCNC: 4.2 G/DL (ref 3.2–4.9)
ALBUMIN/GLOB SERPL: 1.2 G/DL
ALP SERPL-CCNC: 89 U/L (ref 30–99)
ALT SERPL-CCNC: 20 U/L (ref 2–50)
ANION GAP SERPL CALC-SCNC: 19 MMOL/L (ref 7–16)
AST SERPL-CCNC: 15 U/L (ref 12–45)
BASOPHILS # BLD AUTO: 0.6 % (ref 0–1.8)
BASOPHILS # BLD: 0.05 K/UL (ref 0–0.12)
BILIRUB SERPL-MCNC: 0.7 MG/DL (ref 0.1–1.5)
BUN SERPL-MCNC: 18 MG/DL (ref 8–22)
CALCIUM SERPL-MCNC: 10 MG/DL (ref 8.5–10.5)
CHLORIDE SERPL-SCNC: 96 MMOL/L (ref 96–112)
CO2 SERPL-SCNC: 16 MMOL/L (ref 20–33)
CREAT SERPL-MCNC: 1.13 MG/DL (ref 0.5–1.4)
EKG IMPRESSION: NORMAL
EOSINOPHIL # BLD AUTO: 0.12 K/UL (ref 0–0.51)
EOSINOPHIL NFR BLD: 1.5 % (ref 0–6.9)
ERYTHROCYTE [DISTWIDTH] IN BLOOD BY AUTOMATED COUNT: 40.3 FL (ref 35.9–50)
GLOBULIN SER CALC-MCNC: 3.4 G/DL (ref 1.9–3.5)
GLUCOSE BLD-MCNC: 343 MG/DL (ref 70–100)
GLUCOSE SERPL-MCNC: 367 MG/DL (ref 65–99)
HCT VFR BLD AUTO: 48 % (ref 37–47)
HGB BLD-MCNC: 16.7 G/DL (ref 12–16)
IMM GRANULOCYTES # BLD AUTO: 0.03 K/UL (ref 0–0.11)
IMM GRANULOCYTES NFR BLD AUTO: 0.4 % (ref 0–0.9)
LYMPHOCYTES # BLD AUTO: 1.97 K/UL (ref 1–4.8)
LYMPHOCYTES NFR BLD: 24.9 % (ref 22–41)
MCH RBC QN AUTO: 31.3 PG (ref 27–33)
MCHC RBC AUTO-ENTMCNC: 34.8 G/DL (ref 33.6–35)
MCV RBC AUTO: 90.1 FL (ref 81.4–97.8)
MONOCYTES # BLD AUTO: 0.76 K/UL (ref 0–0.85)
MONOCYTES NFR BLD AUTO: 9.6 % (ref 0–13.4)
NEUTROPHILS # BLD AUTO: 4.98 K/UL (ref 2–7.15)
NEUTROPHILS NFR BLD: 63 % (ref 44–72)
NRBC # BLD AUTO: 0 K/UL
NRBC BLD-RTO: 0 /100 WBC
PLATELET # BLD AUTO: 278 K/UL (ref 164–446)
PMV BLD AUTO: 10.7 FL (ref 9–12.9)
POTASSIUM SERPL-SCNC: 4.1 MMOL/L (ref 3.6–5.5)
PROT SERPL-MCNC: 7.6 G/DL (ref 6–8.2)
RBC # BLD AUTO: 5.33 M/UL (ref 4.2–5.4)
SODIUM SERPL-SCNC: 131 MMOL/L (ref 135–145)
TROPONIN T SERPL-MCNC: 21 NG/L (ref 6–19)
TROPONIN T SERPL-MCNC: 26 NG/L (ref 6–19)
WBC # BLD AUTO: 7.9 K/UL (ref 4.8–10.8)

## 2021-08-03 PROCEDURE — 93000 ELECTROCARDIOGRAM COMPLETE: CPT | Performed by: NURSE PRACTITIONER

## 2021-08-03 PROCEDURE — 99214 OFFICE O/P EST MOD 30 MIN: CPT | Performed by: NURSE PRACTITIONER

## 2021-08-03 PROCEDURE — G0378 HOSPITAL OBSERVATION PER HR: HCPCS

## 2021-08-03 PROCEDURE — 93005 ELECTROCARDIOGRAM TRACING: CPT

## 2021-08-03 PROCEDURE — 85025 COMPLETE CBC W/AUTO DIFF WBC: CPT

## 2021-08-03 PROCEDURE — 700105 HCHG RX REV CODE 258: Performed by: EMERGENCY MEDICINE

## 2021-08-03 PROCEDURE — 700102 HCHG RX REV CODE 250 W/ 637 OVERRIDE(OP): Performed by: EMERGENCY MEDICINE

## 2021-08-03 PROCEDURE — A9270 NON-COVERED ITEM OR SERVICE: HCPCS | Performed by: INTERNAL MEDICINE

## 2021-08-03 PROCEDURE — 99285 EMERGENCY DEPT VISIT HI MDM: CPT

## 2021-08-03 PROCEDURE — 99220 PR INITIAL OBSERVATION CARE,LEVL III: CPT | Performed by: INTERNAL MEDICINE

## 2021-08-03 PROCEDURE — 84484 ASSAY OF TROPONIN QUANT: CPT

## 2021-08-03 PROCEDURE — 80053 COMPREHEN METABOLIC PANEL: CPT

## 2021-08-03 PROCEDURE — 82962 GLUCOSE BLOOD TEST: CPT | Performed by: NURSE PRACTITIONER

## 2021-08-03 PROCEDURE — 93005 ELECTROCARDIOGRAM TRACING: CPT | Performed by: EMERGENCY MEDICINE

## 2021-08-03 PROCEDURE — 700102 HCHG RX REV CODE 250 W/ 637 OVERRIDE(OP): Performed by: INTERNAL MEDICINE

## 2021-08-03 PROCEDURE — 71045 X-RAY EXAM CHEST 1 VIEW: CPT

## 2021-08-03 PROCEDURE — 96374 THER/PROPH/DIAG INJ IV PUSH: CPT

## 2021-08-03 RX ORDER — AMOXICILLIN 250 MG
2 CAPSULE ORAL 2 TIMES DAILY
Status: DISCONTINUED | OUTPATIENT
Start: 2021-08-03 | End: 2021-08-05 | Stop reason: HOSPADM

## 2021-08-03 RX ORDER — BISACODYL 10 MG
10 SUPPOSITORY, RECTAL RECTAL
Status: DISCONTINUED | OUTPATIENT
Start: 2021-08-03 | End: 2021-08-05 | Stop reason: HOSPADM

## 2021-08-03 RX ORDER — ATORVASTATIN CALCIUM 40 MG/1
40 TABLET, FILM COATED ORAL EVERY EVENING
Status: DISCONTINUED | OUTPATIENT
Start: 2021-08-03 | End: 2021-08-05 | Stop reason: HOSPADM

## 2021-08-03 RX ORDER — LABETALOL HYDROCHLORIDE 5 MG/ML
10 INJECTION, SOLUTION INTRAVENOUS EVERY 4 HOURS PRN
Status: DISCONTINUED | OUTPATIENT
Start: 2021-08-03 | End: 2021-08-05 | Stop reason: HOSPADM

## 2021-08-03 RX ORDER — DEXTROSE MONOHYDRATE 25 G/50ML
50 INJECTION, SOLUTION INTRAVENOUS
Status: DISCONTINUED | OUTPATIENT
Start: 2021-08-03 | End: 2021-08-05 | Stop reason: HOSPADM

## 2021-08-03 RX ORDER — POLYETHYLENE GLYCOL 3350 17 G/17G
1 POWDER, FOR SOLUTION ORAL
Status: DISCONTINUED | OUTPATIENT
Start: 2021-08-03 | End: 2021-08-05 | Stop reason: HOSPADM

## 2021-08-03 RX ORDER — ACETAMINOPHEN 325 MG/1
650 TABLET ORAL EVERY 6 HOURS PRN
Status: DISCONTINUED | OUTPATIENT
Start: 2021-08-03 | End: 2021-08-05 | Stop reason: HOSPADM

## 2021-08-03 RX ORDER — SODIUM CHLORIDE, SODIUM LACTATE, POTASSIUM CHLORIDE, CALCIUM CHLORIDE 600; 310; 30; 20 MG/100ML; MG/100ML; MG/100ML; MG/100ML
2000 INJECTION, SOLUTION INTRAVENOUS ONCE
Status: COMPLETED | OUTPATIENT
Start: 2021-08-03 | End: 2021-08-04

## 2021-08-03 RX ORDER — OMEPRAZOLE 20 MG/1
CAPSULE, DELAYED RELEASE ORAL
COMMUNITY
End: 2021-08-03

## 2021-08-03 RX ORDER — ENALAPRILAT 1.25 MG/ML
1.25 INJECTION INTRAVENOUS EVERY 6 HOURS PRN
Status: DISCONTINUED | OUTPATIENT
Start: 2021-08-03 | End: 2021-08-05 | Stop reason: HOSPADM

## 2021-08-03 RX ORDER — SODIUM CHLORIDE, SODIUM LACTATE, POTASSIUM CHLORIDE, CALCIUM CHLORIDE 600; 310; 30; 20 MG/100ML; MG/100ML; MG/100ML; MG/100ML
INJECTION, SOLUTION INTRAVENOUS CONTINUOUS
Status: DISCONTINUED | OUTPATIENT
Start: 2021-08-03 | End: 2021-08-05 | Stop reason: HOSPADM

## 2021-08-03 RX ADMIN — INSULIN HUMAN 5 UNITS: 100 INJECTION, SOLUTION PARENTERAL at 21:52

## 2021-08-03 RX ADMIN — DOCUSATE SODIUM 50 MG AND SENNOSIDES 8.6 MG 2 TABLET: 8.6; 5 TABLET, FILM COATED ORAL at 21:51

## 2021-08-03 RX ADMIN — ATORVASTATIN CALCIUM 40 MG: 40 TABLET, FILM COATED ORAL at 21:51

## 2021-08-03 RX ADMIN — SODIUM CHLORIDE, POTASSIUM CHLORIDE, SODIUM LACTATE AND CALCIUM CHLORIDE 2000 ML: 600; 310; 30; 20 INJECTION, SOLUTION INTRAVENOUS at 21:54

## 2021-08-03 ASSESSMENT — HEART SCORE
HEART SCORE: 3
AGE: 65+
HISTORY: SLIGHTLY SUSPICIOUS
ECG: NON-SPECIFIC REPOLARIZATION DISTURBANCE
RISK FACTORS: NO KNOWN RISK FACTORS
TROPONIN: LESS THAN OR EQUAL TO NORMAL LIMIT

## 2021-08-03 ASSESSMENT — ENCOUNTER SYMPTOMS
GASTROINTESTINAL NEGATIVE: 1
EYES NEGATIVE: 1
PSYCHIATRIC NEGATIVE: 1
SHORTNESS OF BREATH: 1
HEADACHES: 1
MUSCULOSKELETAL NEGATIVE: 1
WEAKNESS: 1

## 2021-08-03 ASSESSMENT — FIBROSIS 4 INDEX: FIB4 SCORE: 0.99

## 2021-08-03 ASSESSMENT — PAIN DESCRIPTION - PAIN TYPE: TYPE: ACUTE PAIN

## 2021-08-03 NOTE — PROGRESS NOTES
Subjective:     Ailyn Saavedra is a 81 y.o. female who presents for Headache (headache , fatigue and feeling thirsty x 1 week)      Headache   This is a new problem. The current episode started in the past 7 days (1 week ago Ailyn developed ill feeling. SHe notes increased fatigue, chest pressure, increased thirst). The problem occurs intermittently. The problem has been gradually worsening (SHe states upon awakening she feels pressure in her chest (no pain) and generalized fatigue. She also complains of increased thirst and the feeling she cant take a deep breath. Her symptoms resolve throughout the day.). Pertinent negatives include no abdominal pain, coughing, fever, nausea or vomiting.       Review of Systems   Constitutional: Positive for malaise/fatigue. Negative for chills and fever.   Respiratory: Positive for shortness of breath. Negative for cough.    Cardiovascular: Positive for chest pain.   Gastrointestinal: Negative for abdominal pain, nausea and vomiting.   Neurological: Positive for headaches.       PMH:   Past Medical History:   Diagnosis Date   • Breast cancer (HCC)    • Breath shortness     with exercise   • Cancer (HCC)     eye lid and breast    • CATARACT    • Other specified symptom associated with female genital organs      ALLERGIES: No Known Allergies  SURGHX:   Past Surgical History:   Procedure Laterality Date   • HYSTEROSCOPY WITH VIDEO OPERATIVE  4/3/2013    Performed by Demetrio Webb M.D. at SURGERY SAME DAY [facility] ORS   • DILATION AND CURETTAGE  4/3/2013    Performed by Demetrio Webb M.D. at SURGERY SAME DAY [facility] ORS   • LUMPECTOMY     • OTHER      cataracts   • PB RADIATION THERAPY PLAN SIMPLE       SOCHX:   Social History     Socioeconomic History   • Marital status:      Spouse name: Not on file   • Number of children: Not on file   • Years of education: Not on file   • Highest education level: Not on file   Occupational History   • Not on file  "  Tobacco Use   • Smoking status: Former Smoker     Packs/day: 0.50     Years: 3.00     Pack years: 1.50     Types: Cigarettes     Quit date: 1959     Years since quittin.6   • Smokeless tobacco: Never Used   Substance and Sexual Activity   • Alcohol use: Yes     Alcohol/week: 2.4 oz     Types: 4 Glasses of wine per week     Comment: Occasionally   • Drug use: No   • Sexual activity: Not Currently     Partners: Male   Other Topics Concern   • Not on file   Social History Narrative   • Not on file     Social Determinants of Health     Financial Resource Strain:    • Difficulty of Paying Living Expenses:    Food Insecurity:    • Worried About Running Out of Food in the Last Year:    • Ran Out of Food in the Last Year:    Transportation Needs:    • Lack of Transportation (Medical):    • Lack of Transportation (Non-Medical):    Physical Activity:    • Days of Exercise per Week:    • Minutes of Exercise per Session:    Stress:    • Feeling of Stress :    Social Connections:    • Frequency of Communication with Friends and Family:    • Frequency of Social Gatherings with Friends and Family:    • Attends [employer] Services:    • Active Member of Clubs or Organizations:    • Attends Club or Organization Meetings:    • Marital Status:    Intimate Partner Violence:    • Fear of Current or Ex-Partner:    • Emotionally Abused:    • Physically Abused:    • Sexually Abused:      FH:   Family History   Problem Relation Age of Onset   • Cancer Mother         breast    • Cancer Sister         breast    • Cancer Sister         breast    • Cancer Sister         breast          Objective:   /74 (BP Location: Left arm, Patient Position: Sitting, BP Cuff Size: Large adult)   Pulse 82   Temp 36.9 °C (98.4 °F) (Temporal)   Resp 17   Ht 1.676 m (5' 6\")   Wt 85.7 kg (189 lb)   SpO2 95%   BMI 30.51 kg/m²     Physical Exam  Vitals and nursing note reviewed.   Constitutional:       General: She is not in acute distress.    "  Appearance: Normal appearance. She is normal weight. She is ill-appearing.   HENT:      Head: Normocephalic and atraumatic.      Right Ear: External ear normal.      Left Ear: External ear normal.      Nose: No congestion or rhinorrhea.      Mouth/Throat:      Mouth: Mucous membranes are moist.      Pharynx: No oropharyngeal exudate or posterior oropharyngeal erythema.   Eyes:      General:         Right eye: No discharge.         Left eye: No discharge.      Extraocular Movements: Extraocular movements intact.      Pupils: Pupils are equal, round, and reactive to light.   Cardiovascular:      Rate and Rhythm: Normal rate and regular rhythm.      Pulses: Normal pulses.      Heart sounds: Normal heart sounds.   Pulmonary:      Effort: Pulmonary effort is normal. No respiratory distress.      Breath sounds: Normal breath sounds. No stridor. No wheezing, rhonchi or rales.   Chest:      Chest wall: No tenderness.   Abdominal:      General: Abdomen is flat. Bowel sounds are normal. There is no distension.      Palpations: Abdomen is soft. There is no mass.      Tenderness: There is no abdominal tenderness. There is no right CVA tenderness, left CVA tenderness, guarding or rebound.      Hernia: No hernia is present.   Musculoskeletal:         General: Normal range of motion.      Cervical back: Normal range of motion and neck supple. No tenderness.   Lymphadenopathy:      Cervical: No cervical adenopathy.   Skin:     General: Skin is warm and dry.      Capillary Refill: Capillary refill takes less than 2 seconds.   Neurological:      General: No focal deficit present.      Mental Status: She is alert and oriented to person, place, and time. Mental status is at baseline.   Psychiatric:         Mood and Affect: Mood normal.         Behavior: Behavior normal.         Thought Content: Thought content normal.         Judgment: Judgment normal.       POCT glucose: 320  ECG: Sinus rhythm rate of 75, possible anterior infarct,  flattened T waves  Assessment/Plan:   Assessment    1. Chest pain, unspecified type     2. Shortness of breath  EKG - Clinic Performed   3. Hyperglycemia     4. Fatigue, unspecified type  EKG - Clinic Performed    POCT Glucose   5. Polydipsia  POCT Glucose   6. Health care maintenance  CBC WITH DIFFERENTIAL    Comp Metabolic Panel    Lipid Profile     Due to her elevated blood glucose and symptoms in clinic as well as her abnormal ECG, encouraged her to seek immediate evaluation in ER to rule out MI or other cardiac related event.  Differential diagnoses discussed with patient.  She understands and agrees to seek care in ER.  AVS handout given and reviewed with patient. Pt educated on red flags and when to seek treatment back in ER or UC.

## 2021-08-04 ENCOUNTER — APPOINTMENT (OUTPATIENT)
Dept: CARDIOLOGY | Facility: MEDICAL CENTER | Age: 81
End: 2021-08-04
Attending: INTERNAL MEDICINE
Payer: MEDICARE

## 2021-08-04 ENCOUNTER — APPOINTMENT (OUTPATIENT)
Dept: RADIOLOGY | Facility: MEDICAL CENTER | Age: 81
End: 2021-08-04
Attending: INTERNAL MEDICINE
Payer: MEDICARE

## 2021-08-04 LAB
ANION GAP SERPL CALC-SCNC: 12 MMOL/L (ref 7–16)
BUN SERPL-MCNC: 15 MG/DL (ref 8–22)
CALCIUM SERPL-MCNC: 9.2 MG/DL (ref 8.5–10.5)
CHLORIDE SERPL-SCNC: 100 MMOL/L (ref 96–112)
CHOLEST SERPL-MCNC: 181 MG/DL (ref 100–199)
CO2 SERPL-SCNC: 20 MMOL/L (ref 20–33)
CREAT SERPL-MCNC: 0.97 MG/DL (ref 0.5–1.4)
EKG IMPRESSION: NORMAL
EST. AVERAGE GLUCOSE BLD GHB EST-MCNC: 306 MG/DL
GLUCOSE BLD-MCNC: 189 MG/DL (ref 65–99)
GLUCOSE BLD-MCNC: 320 MG/DL (ref 65–99)
GLUCOSE SERPL-MCNC: 293 MG/DL (ref 65–99)
HBA1C MFR BLD: 12.3 % (ref 4–5.6)
HDLC SERPL-MCNC: 49 MG/DL
LDLC SERPL CALC-MCNC: 115 MG/DL
LV EJECT FRACT  99904: 65
LV EJECT FRACT MOD 2C 99903: 65.71
LV EJECT FRACT MOD 4C 99902: 62.53
LV EJECT FRACT MOD BP 99901: 63.86
POTASSIUM SERPL-SCNC: 3.7 MMOL/L (ref 3.6–5.5)
SODIUM SERPL-SCNC: 132 MMOL/L (ref 135–145)
TRIGL SERPL-MCNC: 87 MG/DL (ref 0–149)
TROPONIN T SERPL-MCNC: 19 NG/L (ref 6–19)

## 2021-08-04 PROCEDURE — G0378 HOSPITAL OBSERVATION PER HR: HCPCS

## 2021-08-04 PROCEDURE — 96372 THER/PROPH/DIAG INJ SC/IM: CPT

## 2021-08-04 PROCEDURE — 82962 GLUCOSE BLOOD TEST: CPT

## 2021-08-04 PROCEDURE — 84484 ASSAY OF TROPONIN QUANT: CPT

## 2021-08-04 PROCEDURE — A9270 NON-COVERED ITEM OR SERVICE: HCPCS | Performed by: INTERNAL MEDICINE

## 2021-08-04 PROCEDURE — 96375 TX/PRO/DX INJ NEW DRUG ADDON: CPT

## 2021-08-04 PROCEDURE — 700111 HCHG RX REV CODE 636 W/ 250 OVERRIDE (IP): Performed by: INTERNAL MEDICINE

## 2021-08-04 PROCEDURE — 700111 HCHG RX REV CODE 636 W/ 250 OVERRIDE (IP)

## 2021-08-04 PROCEDURE — 700102 HCHG RX REV CODE 250 W/ 637 OVERRIDE(OP): Performed by: INTERNAL MEDICINE

## 2021-08-04 PROCEDURE — 93306 TTE W/DOPPLER COMPLETE: CPT

## 2021-08-04 PROCEDURE — 93017 CV STRESS TEST TRACING ONLY: CPT

## 2021-08-04 PROCEDURE — 83036 HEMOGLOBIN GLYCOSYLATED A1C: CPT

## 2021-08-04 PROCEDURE — 80061 LIPID PANEL: CPT

## 2021-08-04 PROCEDURE — 93306 TTE W/DOPPLER COMPLETE: CPT | Mod: 26 | Performed by: INTERNAL MEDICINE

## 2021-08-04 PROCEDURE — 99225 PR SUBSEQUENT OBSERVATION CARE,LEVEL II: CPT | Performed by: NURSE PRACTITIONER

## 2021-08-04 PROCEDURE — 93010 ELECTROCARDIOGRAM REPORT: CPT | Performed by: INTERNAL MEDICINE

## 2021-08-04 PROCEDURE — 700105 HCHG RX REV CODE 258: Performed by: INTERNAL MEDICINE

## 2021-08-04 PROCEDURE — 80048 BASIC METABOLIC PNL TOTAL CA: CPT

## 2021-08-04 PROCEDURE — 93005 ELECTROCARDIOGRAM TRACING: CPT | Mod: XE | Performed by: INTERNAL MEDICINE

## 2021-08-04 RX ORDER — REGADENOSON 0.08 MG/ML
0.4 INJECTION, SOLUTION INTRAVENOUS ONCE
Status: COMPLETED | OUTPATIENT
Start: 2021-08-04 | End: 2021-08-04

## 2021-08-04 RX ORDER — REGADENOSON 0.08 MG/ML
INJECTION, SOLUTION INTRAVENOUS
Status: COMPLETED
Start: 2021-08-04 | End: 2021-08-04

## 2021-08-04 RX ORDER — AMINOPHYLLINE 25 MG/ML
100 INJECTION, SOLUTION INTRAVENOUS
Status: DISCONTINUED | OUTPATIENT
Start: 2021-08-04 | End: 2021-08-05 | Stop reason: HOSPADM

## 2021-08-04 RX ADMIN — SODIUM CHLORIDE, POTASSIUM CHLORIDE, SODIUM LACTATE AND CALCIUM CHLORIDE: 600; 310; 30; 20 INJECTION, SOLUTION INTRAVENOUS at 00:17

## 2021-08-04 RX ADMIN — ASPIRIN 81 MG: 81 TABLET, COATED ORAL at 05:24

## 2021-08-04 RX ADMIN — ENALAPRILAT 1.25 MG: 1.25 INJECTION INTRAVENOUS at 14:38

## 2021-08-04 RX ADMIN — ENOXAPARIN SODIUM 40 MG: 40 INJECTION SUBCUTANEOUS at 05:25

## 2021-08-04 RX ADMIN — INSULIN HUMAN 4 UNITS: 100 INJECTION, SOLUTION PARENTERAL at 14:40

## 2021-08-04 RX ADMIN — DOCUSATE SODIUM 50 MG AND SENNOSIDES 8.6 MG 2 TABLET: 8.6; 5 TABLET, FILM COATED ORAL at 05:24

## 2021-08-04 RX ADMIN — REGADENOSON 0.4 MG: 0.08 INJECTION, SOLUTION INTRAVENOUS at 11:10

## 2021-08-04 RX ADMIN — ATORVASTATIN CALCIUM 40 MG: 40 TABLET, FILM COATED ORAL at 19:17

## 2021-08-04 RX ADMIN — INSULIN HUMAN 3 UNITS: 100 INJECTION, SOLUTION PARENTERAL at 19:17

## 2021-08-04 ASSESSMENT — ENCOUNTER SYMPTOMS
DIARRHEA: 0
NAUSEA: 0
HEARTBURN: 0
NAUSEA: 0
ABDOMINAL PAIN: 0
MYALGIAS: 0
SHORTNESS OF BREATH: 0
VOMITING: 0
CONSTIPATION: 0
CHILLS: 0
COUGH: 0
BACK PAIN: 0
VOMITING: 0
SHORTNESS OF BREATH: 1
WEAKNESS: 0
DIZZINESS: 0
CHILLS: 0
PSYCHIATRIC NEGATIVE: 1
FEVER: 0
HEADACHES: 0
FEVER: 0
ABDOMINAL PAIN: 0
COUGH: 0

## 2021-08-04 ASSESSMENT — LIFESTYLE VARIABLES
HOW MANY TIMES IN THE PAST YEAR HAVE YOU HAD 5 OR MORE DRINKS IN A DAY: 0
ALCOHOL_USE: YES
HAVE PEOPLE ANNOYED YOU BY CRITICIZING YOUR DRINKING: NO
EVER FELT BAD OR GUILTY ABOUT YOUR DRINKING: NO
TOTAL SCORE: 0
EVER HAD A DRINK FIRST THING IN THE MORNING TO STEADY YOUR NERVES TO GET RID OF A HANGOVER: NO
TOTAL SCORE: 0
HAVE YOU EVER FELT YOU SHOULD CUT DOWN ON YOUR DRINKING: NO
CONSUMPTION TOTAL: NEGATIVE
DOES PATIENT WANT TO STOP DRINKING: NO
ON A TYPICAL DAY WHEN YOU DRINK ALCOHOL HOW MANY DRINKS DO YOU HAVE: 1
TOTAL SCORE: 0
AVERAGE NUMBER OF DAYS PER WEEK YOU HAVE A DRINK CONTAINING ALCOHOL: 1

## 2021-08-04 ASSESSMENT — PAIN DESCRIPTION - PAIN TYPE
TYPE: ACUTE PAIN

## 2021-08-04 ASSESSMENT — PATIENT HEALTH QUESTIONNAIRE - PHQ9
2. FEELING DOWN, DEPRESSED, IRRITABLE, OR HOPELESS: NOT AT ALL
1. LITTLE INTEREST OR PLEASURE IN DOING THINGS: NOT AT ALL
SUM OF ALL RESPONSES TO PHQ9 QUESTIONS 1 AND 2: 0

## 2021-08-04 NOTE — ED PROVIDER NOTES
"ED Provider Note    CHIEF COMPLAINT  Chest pain  Hyperglycemia  Possible MI read by EKG    HPI  Ailyn Saavedra is a 81 y.o. female who presents with multiple problems.  Her main concern is that she is worried about dying when she goes to sleep.  This morning when she woke up she had chest discomfort.  She is a poor historian she is diffuse.  Is not rating to the back was not tearing.  Nothing really made it worse or better.  It may have lasted about 5 to 10 minutes.  Has been when she got up and walked to the kitchen.  Associate with some general fatigue and shortness of breath.    She is seen at the urgent care.  There they told her that she had \"a heart attack\".  They sent her here for further evaluation.  She is also told she had elevated glucose.    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  According to family she been more forgetful lately.  She was not behaving normally.    She DVT no history of PE she is on estrogen therapy.    Patient history of breast cancer in the past she is in remission.    She denies any tearing sensation no diaphoresis.  No dizziness no syncope.      REVIEW OF SYSTEMS  General: No fever or chills.  Eyes: No eye discharge. No eye pain.  Ear nose throat: No sore throat or  trouble swallowing.  Pulmonary: No shortness of breath or cough.  Cardiovascular: See above  GI: No abdominal pain nausea or vomiting.  : No dysuria or hematuria  Dermatologic: No rashes. No abrasions.  Neurologic: No weakness or numbness.  Psychiatric: As described above    All other systems are negative      PAST MEDICAL HISTORY  Past Medical History:   Diagnosis Date   • Breast cancer (HCC)    • Breath shortness     with exercise   • Cancer (HCC)     eye lid and breast    • CATARACT    • Other specified symptom associated with female genital organs        FAMILY HISTORY  Family History   Problem Relation Age of Onset   • Cancer " Mother         breast    • Cancer Sister         breast    • Cancer Sister         breast    • Cancer Sister         breast        SOCIAL HISTORY  Social History     Socioeconomic History   • Marital status:      Spouse name: Not on file   • Number of children: Not on file   • Years of education: Not on file   • Highest education level: Not on file   Occupational History   • Not on file   Tobacco Use   • Smoking status: Former Smoker     Packs/day: 0.50     Years: 3.00     Pack years: 1.50     Types: Cigarettes     Quit date: 1959     Years since quittin.6   • Smokeless tobacco: Never Used   Vaping Use   • Vaping Use: Never used   Substance and Sexual Activity   • Alcohol use: Yes     Alcohol/week: 2.4 oz     Types: 4 Glasses of wine per week     Comment: Occasionally   • Drug use: No   • Sexual activity: Not Currently     Partners: Male   Other Topics Concern   • Not on file   Social History Narrative   • Not on file     Social Determinants of Health     Financial Resource Strain:    • Difficulty of Paying Living Expenses:    Food Insecurity:    • Worried About Running Out of Food in the Last Year:    • Ran Out of Food in the Last Year:    Transportation Needs:    • Lack of Transportation (Medical):    • Lack of Transportation (Non-Medical):    Physical Activity:    • Days of Exercise per Week:    • Minutes of Exercise per Session:    Stress:    • Feeling of Stress :    Social Connections:    • Frequency of Communication with Friends and Family:    • Frequency of Social Gatherings with Friends and Family:    • Attends [employer] Services:    • Active Member of Clubs or Organizations:    • Attends Club or Organization Meetings:    • Marital Status:    Intimate Partner Violence:    • Fear of Current or Ex-Partner:    • Emotionally Abused:    • Physically Abused:    • Sexually Abused:        SURGICAL HISTORY  Past Surgical History:   Procedure Laterality Date   • HYSTEROSCOPY WITH VIDEO OPERATIVE   "4/3/2013    Performed by Demetrio Webb M.D. at SURGERY SAME DAY [facility] ORS   • DILATION AND CURETTAGE  4/3/2013    Performed by Demetrio Webb M.D. at SURGERY SAME DAY [facility] ORS   • LUMPECTOMY     • OTHER      cataracts   • PB RADIATION THERAPY PLAN SIMPLE         CURRENT MEDICATIONS  Home Medications     Reviewed by Monika Gomez R.N. (Registered Nurse) on 08/03/21 at 1814  Med List Status: Complete   Medication Last Dose Status        Patient Manish Taking any Medications                        ALLERGIES  No Known Allergies    PHYSICAL EXAM  VITAL SIGNS: /80   Pulse 98   Temp 36.7 °C (98 °F) (Oral)   Resp 16   Ht 1.676 m (5' 6\")   Wt 86.5 kg (190 lb 11.2 oz)   SpO2 92%   BMI 30.78 kg/m²      Pressure on the right is 172 on the right is 183.  Constitutional: Well developed, Well nourished, No acute distress, Non-toxic appearance.   HENT: Normocephalic, Atraumatic, Bilateral external ears normal, Oropharynx moist, No oral exudates, Nose normal.   Eyes: PERRLA, EOMI, Conjunctiva normal, No discharge.   Musculoskeletal: Neck has normal range of motion, No tenderness, Supple.   Lymphatic: No cervical lymphadenopathy noted.   Cardiovascular: Normal heart rate, Normal rhythm, No murmurs, No rubs, No gallops.   Thorax & Lungs: Normal breath sounds, No respiratory distress, No wheezing, No chest tenderness.   Abdomen: Nondistended nontender soft  Skin: Warm, Dry, No erythema, No rash.   : No CVA tenderness.   Psychiatric: Calm, not anxious  Neurologic: Alert & oriented, moves all extremities equally    RADIOLOGY/PROCEDURES  Results for orders placed or performed during the hospital encounter of 08/03/21   CBC with Differential   Result Value Ref Range    WBC 7.9 4.8 - 10.8 K/uL    RBC 5.33 4.20 - 5.40 M/uL    Hemoglobin 16.7 (H) 12.0 - 16.0 g/dL    Hematocrit 48.0 (H) 37.0 - 47.0 %    MCV 90.1 81.4 - 97.8 fL    MCH 31.3 27.0 - 33.0 pg    MCHC 34.8 33.6 - 35.0 g/dL    RDW 40.3 35.9 - 50.0 " fL    Platelet Count 278 164 - 446 K/uL    MPV 10.7 9.0 - 12.9 fL    Neutrophils-Polys 63.00 44.00 - 72.00 %    Lymphocytes 24.90 22.00 - 41.00 %    Monocytes 9.60 0.00 - 13.40 %    Eosinophils 1.50 0.00 - 6.90 %    Basophils 0.60 0.00 - 1.80 %    Immature Granulocytes 0.40 0.00 - 0.90 %    Nucleated RBC 0.00 /100 WBC    Neutrophils (Absolute) 4.98 2.00 - 7.15 K/uL    Lymphs (Absolute) 1.97 1.00 - 4.80 K/uL    Monos (Absolute) 0.76 0.00 - 0.85 K/uL    Eos (Absolute) 0.12 0.00 - 0.51 K/uL    Baso (Absolute) 0.05 0.00 - 0.12 K/uL    Immature Granulocytes (abs) 0.03 0.00 - 0.11 K/uL    NRBC (Absolute) 0.00 K/uL   Complete Metabolic Panel (CMP)   Result Value Ref Range    Sodium 131 (L) 135 - 145 mmol/L    Potassium 4.1 3.6 - 5.5 mmol/L    Chloride 96 96 - 112 mmol/L    Co2 16 (L) 20 - 33 mmol/L    Anion Gap 19.0 (H) 7.0 - 16.0    Glucose 367 (H) 65 - 99 mg/dL    Bun 18 8 - 22 mg/dL    Creatinine 1.13 0.50 - 1.40 mg/dL    Calcium 10.0 8.5 - 10.5 mg/dL    AST(SGOT) 15 12 - 45 U/L    ALT(SGPT) 20 2 - 50 U/L    Alkaline Phosphatase 89 30 - 99 U/L    Total Bilirubin 0.7 0.1 - 1.5 mg/dL    Albumin 4.2 3.2 - 4.9 g/dL    Total Protein 7.6 6.0 - 8.2 g/dL    Globulin 3.4 1.9 - 3.5 g/dL    A-G Ratio 1.2 g/dL   Troponin   Result Value Ref Range    Troponin T 21 (H) 6 - 19 ng/L   ESTIMATED GFR   Result Value Ref Range    GFR If  56 (A) >60 mL/min/1.73 m 2    GFR If Non  46 (A) >60 mL/min/1.73 m 2   EKG   Result Value Ref Range    Report       [facility] Emergency Dept.    Test Date:  2021  Pt Name:    CANELO AGUAYO          Department: ER  MRN:        [id number]                      Room:  Gender:     Female                       Technician: [id number]  :        [id number]                   Requested By:ER TRIAGE PROTOCOL  Order #:    [id number]                    Reading MD: Pedrito SANTOS MD    Measurements  Intervals                                Axis  Rate:        92                           P:          75  IA:         160                          QRS:        -16  QRSD:       86                           T:          67  QT:         376  QTc:        466    Interpretive Statements  Sinus rhythm rate of 92.  Normal IA.  Normal QRS.  Left axis deviation noted.  Inverted T waves in 5 T waves V1 V2 no ST segment elevations or depressions  no  acute change from EKG dated 3/24/2017.  Abnormal no acute ischemic findings  Electronically Signed On 8-3-2021 20:42:37 PDT by Pedrito BRAVO MD        DX-CHEST-PORTABLE (1 VIEW)   Final Result      No acute cardiopulmonary abnormality identified.            COURSE & MEDICAL DECISION MAKING  Pertinent Labs & Imaging studies reviewed. (See chart for details)  Is a very pleasant female who is slightly confused.  She had chest pain earlier today now resolved a single troponin was read as negative with 21.  She is hypertensive and she is hyperglycemic with a gap.  Patient received IV fluids.  Patient received IV insulin.    Hemoconcentration is also noted    This is a lady who has a new onset diabetes.  She is pale she is had chest discomfort.  She has an EKG that is abnormal but no acute ischemic findings.  I shared this with the family members.    Have a low heart score.  The patient at this point my impression will have a discussion with the hospitalist regarding possible admission.    FINAL IMPRESSION  1.  New onset diabetes  2.  Chest pain  3.      Electronically signed by: Pedrito Hodge M.D., 8/3/2021 9:03 PM

## 2021-08-04 NOTE — ASSESSMENT & PLAN NOTE
-previous glucose readings are consistently high  -HbA1C: 12.3%, new diagnosis diabetes 2  -Insulin SS

## 2021-08-04 NOTE — PROGRESS NOTES
Monitor summary: New at 2342 SR 61-72, UT .20, QRS .06, QT .41 with rare PVC per strip from monitor room.

## 2021-08-04 NOTE — PROGRESS NOTES
Bedside report received 8380. POC discussed with pt; Pt denies CP; No overnight cardiac events; all questions answered at this time.

## 2021-08-04 NOTE — ASSESSMENT & PLAN NOTE
-Tele monitor, only rare PVCs  -Trop trend, now within normal limits  -EKG trend, no ischemic findings  -ECHO within normal limits  -Stress test: No evidence of significant jeopardized viable myocardium or prior myocardial infarction. Normal left ventricular size, ejection fraction, and wall motion.  -ASA 81mg  -Lipid profile:   Cholesterol,Tot   Date Value Ref Range Status   08/04/2021 181 100 - 199 mg/dL Final   03/18/2016 191 100 - 199 mg/dL Final     Triglycerides   Date Value Ref Range Status   08/04/2021 87 0 - 149 mg/dL Final   03/18/2016 97 0 - 149 mg/dL Final     HDL   Date Value Ref Range Status   08/04/2021 49 >=40 mg/dL Final   03/18/2016 50 >=40 mg/dL Final     LDL   Date Value Ref Range Status   08/04/2021 115 (H) <100 mg/dL Final   03/18/2016 122 (H) <100 mg/dL Final      -Atorvastatin 40mg qhs  resolved

## 2021-08-04 NOTE — ED NOTES
Pt reports she is feeling worse and seems to have increased SOB. BP is significantly lower than on arrival. Daughter is now at bedside and is concerned for some mild disorientation. Charge RN aware.

## 2021-08-04 NOTE — ED TRIAGE NOTES
Pt to ED, sent form , for complaints of a general feeling of being unwell for 1 weeks. She reports thirst, fatigue, chest pressure in the mornings and some shortness of breath. Today, on waking, she felt chest discomfort and reported she was very pale. She states about a week ago she was very fatigued after a tennis game and since that time has been waking up with chest pressure that goes away as the day goes on. She is somewhat short of breath on arrival to ED. No cardiac hx.    noted some EKG changes that were suggestive of and old infarct and instructed pt to come to ERP. FSBS at  was 343. She is not diabetic.       Pt educated on ED process and asked to wait in lobby. Patient educated on importance of alerting staff to new or worsening symptoms or concerns.

## 2021-08-04 NOTE — CARE PLAN
The patient is Stable - Low risk of patient condition declining or worsening    Shift Goals  Clinical Goals: Stress test  Patient Goals: Discharge  Family Goals: N/a    Progress made toward(s) clinical / shift goals:    Pt educated on stress test procedure    Patient is not progressing towards the following goals:

## 2021-08-04 NOTE — DISCHARGE PLANNING
Care Transition Team Assessment    Spoke with patient at bedside and verified all information. Lives in single [facility] and adult son just moved in with her about 1 month ago. Uses no DME now but has a walker that she used in the past. PCP Heidi Medrano. Has [facility] insurance. Uses [facility]. Friend will be ride @ D/C. Anticipate no needs @ present time.     Information Source  Orientation Level: Oriented X4  Information Given By: Patient    Readmission Evaluation  Is this a readmission?: No    Interdisciplinary Discharge Planning  Primary Care Physician: Heidi Medrano  Lives with - Patient's Self Care Capacity: Adult Children  Patient or legal guardian wants to designate a caregiver: No  Support Systems: Children, Friends / Neighbors  Housing / Facility: [address] [facility]  Do You Take your Prescribed Medications Regularly: No  Reasons Why Not Taking Medications :  (No RX's.)  Able to Return to Previous ADL's: Yes  Mobility Issues: No  Prior Services: Home-Independent  Patient Prefers to be Discharged to:: Home  Assistance Needed: No  Durable Medical Equipment: Not Applicable    Discharge Preparedness  What are your discharge supports?: Child  Prior Functional Level: Ambulatory    Functional Assesment  Prior Functional Level: Ambulatory    Finances  Prescription Coverage: Yes    Anticipated Discharge Information  Discharge Address: [address] [place] [address]  Discharge Contact Phone Number: [phone]

## 2021-08-04 NOTE — ED NOTES
Med Rec completed: per patient at bedside  Preferred Pharmacy: [facility]  Allergies:  No Known Allergies    No ORAL antibiotics in last 14 days    Home Medications:    Medication Sig Comments   • aspirin EC (ECOTRIN) 81 MG Tablet Delayed Response Take 162 mg by mouth one time.   2 tabs = 162 mg **one time dose. Does not take daily     **will occasionally take a vit b12 or Emergen-C PRN but does not take daily.

## 2021-08-04 NOTE — H&P
"Hospital Medicine History & Physical Note    Date of Service  8/3/2021    Primary Care Physician  Heidi Medrano M.D.    Consultants  None     Code Status  Full Code    Chief Complaint  Chief Complaint   Patient presents with   • Sent from Urgent Care   • Hyperglycemia   • Abnormal EKG       History of Presenting Illness  Ailyn Saavedra is a 81 y.o. female who presented 8/3/2021 with chest heaviness. Patient states that she's had some chest tightness since last Friday not associated with any exercise. She plays tennis regularly and did not have any pain then however upon returning home, she felt extremely tired and sob. She had the chest heaviness again when she woke up this morning, took 2 tabs of baby aspirin without any relief. She had stress test done 4 years ago due to angina and was told her \"heart was inflamed\". Patient also reports that she is not a diabetic though her glucose was elevated on arrival to ED. Patient states that she still has some ongoing chest pain. With troponin elevation and symptoms, will admit patient for observation to rule out ACS.     I discussed the plan of care with patient, family and bedside RN.    Review of Systems  Review of Systems   Constitutional: Positive for malaise/fatigue.   HENT: Negative.    Eyes: Negative.    Respiratory: Positive for shortness of breath.    Cardiovascular: Positive for chest pain.   Gastrointestinal: Negative.    Genitourinary: Negative.    Musculoskeletal: Negative.    Skin: Negative.    Neurological: Positive for weakness.   Psychiatric/Behavioral: Negative.        Past Medical History   has a past medical history of Breast cancer (HCC), Breath shortness, Cancer (HCC), CATARACT, and Other specified symptom associated with female genital organs.    Surgical History   has a past surgical history that includes lumpectomy; pr radiation therapy plan simple; other; hysteroscopy with video operative (4/3/2013); and dilation and curettage (4/3/2013). "     Family History  family history includes Cancer in her mother, sister, sister, and sister.   Family history reviewed with patient. There is no family history that is pertinent to the chief complaint.     Social History   reports that she quit smoking about 62 years ago. Her smoking use included cigarettes. She has a 1.50 pack-year smoking history. She has never used smokeless tobacco. She reports current alcohol use of about 2.4 oz of alcohol per week. She reports that she does not use drugs.    Allergies  No Known Allergies    Medications  Prior to Admission Medications   Prescriptions Last Dose Informant Patient Reported? Taking?   aspirin EC (ECOTRIN) 81 MG Tablet Delayed Response 8/3/2021 at 0400 Patient Yes Yes   Sig: Take 162 mg by mouth one time. 2 tabs = 162 mg      Facility-Administered Medications: None       Physical Exam  Temp:  [36.7 °C (98 °F)-36.9 °C (98.4 °F)] 36.7 °C (98 °F)  Pulse:  [] 76  Resp:  [16] 16  BP: (120-181)/() 139/84  SpO2:  [92 %-95 %] 93 %    Physical Exam  Vitals and nursing note reviewed.   Constitutional:       General: She is not in acute distress.     Appearance: Normal appearance. She is normal weight. She is not ill-appearing.   HENT:      Head: Normocephalic and atraumatic.      Mouth/Throat:      Mouth: Mucous membranes are moist.      Pharynx: Oropharynx is clear.   Eyes:      General: No scleral icterus.     Extraocular Movements: Extraocular movements intact.      Pupils: Pupils are equal, round, and reactive to light.      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      Pulses: Normal pulses.      Heart sounds: Normal heart sounds. No murmur heard.     Pulmonary:      Effort: Pulmonary effort is normal.      Breath sounds: Normal breath sounds. No rhonchi.   Abdominal:      General: Abdomen is flat. Bowel sounds are normal. There is  no distension.      Palpations: Abdomen is soft.      Tenderness: There is no abdominal tenderness. There is no guarding.      Comments: S/p left breast resection   Musculoskeletal:         General: No swelling or tenderness. Normal range of motion.      Cervical back: Normal range of motion and neck supple. No rigidity or tenderness.   Skin:     General: Skin is warm and dry.      Coloration: Skin is not jaundiced.   Neurological:      General: No focal deficit present.      Mental Status: She is alert and oriented to person, place, and time. Mental status is at baseline.      Cranial Nerves: No cranial nerve deficit.      Motor: Weakness present.   Psychiatric:         Mood and Affect: Mood normal.         Behavior: Behavior normal.         Thought Content: Thought content normal.         Judgment: Judgment normal.         Laboratory:  Recent Labs     08/03/21 1911   WBC 7.9   RBC 5.33   HEMOGLOBIN 16.7*   HEMATOCRIT 48.0*   MCV 90.1   MCH 31.3   MCHC 34.8   RDW 40.3   PLATELETCT 278   MPV 10.7     Recent Labs     08/03/21 1911   SODIUM 131*   POTASSIUM 4.1   CHLORIDE 96   CO2 16*   GLUCOSE 367*   BUN 18   CREATININE 1.13   CALCIUM 10.0     Recent Labs     08/03/21 1911   ALTSGPT 20   ASTSGOT 15   ALKPHOSPHAT 89   TBILIRUBIN 0.7   GLUCOSE 367*         No results for input(s): NTPROBNP in the last 72 hours.      Recent Labs     08/03/21 1911   TROPONINT 21*       Imaging:  DX-CHEST-PORTABLE (1 VIEW)   Final Result      No acute cardiopulmonary abnormality identified.      EC-ECHOCARDIOGRAM COMPLETE W/O CONT    (Results Pending)   NM-CARDIAC STRESS TEST    (Results Pending)       EKG:  I have personally reviewed the images and compared with prior images.    Assessment/Plan:  I anticipate this patient is appropriate for observation status at this time.    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

## 2021-08-05 ENCOUNTER — PATIENT OUTREACH (OUTPATIENT)
Dept: HEALTH INFORMATION MANAGEMENT | Facility: OTHER | Age: 81
End: 2021-08-05

## 2021-08-05 ENCOUNTER — PHARMACY VISIT (OUTPATIENT)
Dept: PHARMACY | Facility: MEDICAL CENTER | Age: 81
End: 2021-08-05
Payer: COMMERCIAL

## 2021-08-05 VITALS
OXYGEN SATURATION: 91 % | BODY MASS INDEX: 31.11 KG/M2 | DIASTOLIC BLOOD PRESSURE: 86 MMHG | WEIGHT: 193.56 LBS | SYSTOLIC BLOOD PRESSURE: 166 MMHG | RESPIRATION RATE: 18 BRPM | TEMPERATURE: 97.1 F | HEIGHT: 66 IN | HEART RATE: 64 BPM

## 2021-08-05 PROCEDURE — RXMED WILLOW AMBULATORY MEDICATION CHARGE: Performed by: STUDENT IN AN ORGANIZED HEALTH CARE EDUCATION/TRAINING PROGRAM

## 2021-08-05 PROCEDURE — 700111 HCHG RX REV CODE 636 W/ 250 OVERRIDE (IP): Performed by: INTERNAL MEDICINE

## 2021-08-05 PROCEDURE — G0378 HOSPITAL OBSERVATION PER HR: HCPCS

## 2021-08-05 PROCEDURE — 99217 PR OBSERVATION CARE DISCHARGE: CPT | Performed by: STUDENT IN AN ORGANIZED HEALTH CARE EDUCATION/TRAINING PROGRAM

## 2021-08-05 PROCEDURE — 700102 HCHG RX REV CODE 250 W/ 637 OVERRIDE(OP): Performed by: INTERNAL MEDICINE

## 2021-08-05 PROCEDURE — A9270 NON-COVERED ITEM OR SERVICE: HCPCS | Performed by: INTERNAL MEDICINE

## 2021-08-05 PROCEDURE — 96372 THER/PROPH/DIAG INJ SC/IM: CPT

## 2021-08-05 RX ORDER — LANCETS 30 GAUGE
EACH MISCELLANEOUS
Qty: 100 EACH | Refills: 0 | Status: SHIPPED | OUTPATIENT
Start: 2021-08-05 | End: 2023-10-17

## 2021-08-05 RX ORDER — GLUCOSAMINE HCL/CHONDROITIN SU 500-400 MG
CAPSULE ORAL
Qty: 100 EACH | Refills: 0 | Status: SHIPPED | OUTPATIENT
Start: 2021-08-05

## 2021-08-05 RX ADMIN — ENOXAPARIN SODIUM 40 MG: 40 INJECTION SUBCUTANEOUS at 05:13

## 2021-08-05 RX ADMIN — INSULIN HUMAN 2 UNITS: 100 INJECTION, SOLUTION PARENTERAL at 12:33

## 2021-08-05 RX ADMIN — INSULIN HUMAN 3 UNITS: 100 INJECTION, SOLUTION PARENTERAL at 09:49

## 2021-08-05 RX ADMIN — ASPIRIN 81 MG: 81 TABLET, COATED ORAL at 05:12

## 2021-08-05 ASSESSMENT — PAIN DESCRIPTION - PAIN TYPE: TYPE: ACUTE PAIN

## 2021-08-05 NOTE — PROGRESS NOTES
Transfer to [facility] with transport. All belongings with patient. Pt stable.

## 2021-08-05 NOTE — HOSPITAL COURSE
"Ailyn Saavedra is a 81 y.o. female who presented 8/3/2021 with chest heaviness. Patient states that she's had some chest tightness since last Friday not associated with any exercise. She plays tennis regularly and did not have any pain then however upon returning home, she felt extremely tired and sob. She had the chest heaviness again when she woke up this morning, took 2 tabs of baby aspirin without any relief. She had stress test done 4 years ago due to angina and was told her \"heart was inflamed.\" Patient also reports that she is not a diabetic though her glucose was elevated on arrival to ED. Patient states that she still has some ongoing chest pain. With troponin elevation and symptoms, patient admitted for observation to rule out ACS.   "

## 2021-08-05 NOTE — PROGRESS NOTES
"Hospital Medicine Daily Progress Note    Date of Service  8/4/2021    Chief Complaint  Ailyn Saavedra is a 81 y.o. female admitted 8/3/2021 with abnormal EKG, hyperglycemia    Hospital Course  Ailyn Saavedra is a 81 y.o. female who presented 8/3/2021 with chest heaviness. Patient states that she's had some chest tightness since last Friday not associated with any exercise. She plays tennis regularly and did not have any pain then however upon returning home, she felt extremely tired and sob. She had the chest heaviness again when she woke up this morning, took 2 tabs of baby aspirin without any relief. She had stress test done 4 years ago due to angina and was told her \"heart was inflamed.\" Patient also reports that she is not a diabetic though her glucose was elevated on arrival to ED. Patient states that she still has some ongoing chest pain. With troponin elevation and symptoms, patient admitted for observation to rule out ACS.       Interval Problem Update  Today the patient is sitting up in her bed, pleasant and cooperative.  She is fully alert and oriented.  Her chest pain has completely resolved, however she still feels occasionally tight across her chest.  She denies headache, dizziness, nausea, fevers and any other problems.  -Hemoglobin A1c is 12.3%, new diagnosis diabetes 2.  Completed diabetes education.  -Continue fingersticks and evaluate effectiveness of sliding scale insulin therapy  -Troponins, echocardiogram and stress test within normal limits  -Transfer to [facility]    I have personally seen and examined the patient at bedside. I discussed the plan of care with patient, bedside RN, charge RN, , dietitian and Dr. Holden, diabetes educator.    Consultants/Specialty  none    Code Status  Full Code    Disposition  Patient is medically cleared pending blood glucose management.   Anticipate discharge to to home with close outpatient follow-up.  I have placed the appropriate orders " for post-discharge needs.    Review of Systems  Review of Systems   Constitutional: Positive for malaise/fatigue. Negative for chills and fever.   Respiratory: Negative for cough and shortness of breath.    Cardiovascular: Negative for chest pain and leg swelling.   Gastrointestinal: Negative for abdominal pain, constipation, diarrhea, heartburn, nausea and vomiting.   Genitourinary: Negative for dysuria, frequency and urgency.   Musculoskeletal: Negative for back pain and myalgias.   Neurological: Negative for dizziness, weakness and headaches.   Psychiatric/Behavioral: Negative.         Physical Exam  Temp:  [35.7 °C (96.3 °F)-36.9 °C (98.4 °F)] 36.6 °C (97.9 °F)  Pulse:  [] 74  Resp:  [15-21] 16  BP: (118-203)/() 157/72  SpO2:  [89 %-96 %] 94 %    Physical Exam  Vitals and nursing note reviewed.   Constitutional:       General: She is awake. She is not in acute distress.     Appearance: Normal appearance. She is normal weight. She is not ill-appearing.   HENT:      Head: Normocephalic and atraumatic.      Mouth/Throat:      Mouth: Mucous membranes are moist.      Pharynx: Oropharynx is clear.   Eyes:      General: No scleral icterus.     Extraocular Movements: Extraocular movements intact.      Pupils: Pupils are equal, round, and reactive to light.      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      Pulses: Normal pulses.      Heart sounds: Normal heart sounds. No murmur heard.     Pulmonary:      Effort: Pulmonary effort is normal.      Breath sounds: Normal breath sounds. No rhonchi.   Abdominal:      General: Abdomen is flat. Bowel sounds are normal. There is no distension.      Palpations: Abdomen is soft.      Tenderness: There is no abdominal tenderness. There is no guarding.      Comments: S/p left breast resection   Musculoskeletal:         General: No swelling or  tenderness. Normal range of motion.      Cervical back: Normal range of motion and neck supple. No rigidity or tenderness.   Skin:     General: Skin is warm and dry.      Coloration: Skin is not jaundiced.   Neurological:      General: No focal deficit present.      Mental Status: She is alert and oriented to person, place, and time. Mental status is at baseline.      Cranial Nerves: No cranial nerve deficit.      Motor: No weakness.   Psychiatric:         Attention and Perception: Attention normal.         Mood and Affect: Mood normal.         Speech: Speech normal.         Behavior: Behavior normal. Behavior is cooperative.         Thought Content: Thought content normal.         Judgment: Judgment normal.         Fluids    Intake/Output Summary (Last 24 hours) at 8/4/2021 1711  Last data filed at 8/4/2021 0700  Gross per 24 hour   Intake 503.75 ml   Output --   Net 503.75 ml       Laboratory  Recent Labs     08/03/21 1911   WBC 7.9   RBC 5.33   HEMOGLOBIN 16.7*   HEMATOCRIT 48.0*   MCV 90.1   MCH 31.3   MCHC 34.8   RDW 40.3   PLATELETCT 278   MPV 10.7     Recent Labs     08/03/21  1911 08/04/21  0020   SODIUM 131* 132*   POTASSIUM 4.1 3.7   CHLORIDE 96 100   CO2 16* 20   GLUCOSE 367* 293*   BUN 18 15   CREATININE 1.13 0.97   CALCIUM 10.0 9.2             Recent Labs     08/04/21  0020   TRIGLYCERIDE 87   HDL 49   *       Imaging  NM-CARDIAC STRESS TEST   Final Result      EC-ECHOCARDIOGRAM COMPLETE W/O CONT   Final Result      DX-CHEST-PORTABLE (1 VIEW)   Final Result      No acute cardiopulmonary abnormality identified.           Assessment/Plan  * Pain in the chest- (present on admission)  Assessment & Plan  -Tele monitor, only rare PVCs  -Trop trend, now within normal limits  -EKG trend, no ischemic findings  -ECHO within normal limits  -Stress test: No evidence of significant jeopardized viable myocardium or prior myocardial infarction. Normal left ventricular size, ejection fraction, and wall  motion.  -ASA 81mg  -Lipid profile:   Cholesterol,Tot   Date Value Ref Range Status   08/04/2021 181 100 - 199 mg/dL Final   03/18/2016 191 100 - 199 mg/dL Final     Triglycerides   Date Value Ref Range Status   08/04/2021 87 0 - 149 mg/dL Final   03/18/2016 97 0 - 149 mg/dL Final     HDL   Date Value Ref Range Status   08/04/2021 49 >=40 mg/dL Final   03/18/2016 50 >=40 mg/dL Final     LDL   Date Value Ref Range Status   08/04/2021 115 (H) <100 mg/dL Final   03/18/2016 122 (H) <100 mg/dL Final      -Atorvastatin 40mg qhs  resolved    Hyperglycemia- (present on admission)  Assessment & Plan  -previous glucose readings are consistently high  -HbA1C: 12.3%, new diagnosis diabetes 2  -Insulin SS    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 In review of yesterday's note (8/3/2021), there are no changes except as documented above.    KAY Asher.

## 2021-08-05 NOTE — CONSULTS
Diabetes education:   Pt is newly dx with diabetes. Pt states she has watched her son ( who lives with her) take his insulin and do his finger sticks. Pt was admitted with blood sugar of 367 and Hg a1c of 12.3%. Pt is currently on regular insulin sliding scale coverage ac and hs with only on finger stick available ( 320 /4 units).   Met with pt this afternoon. Pt denies history of diabetes. States she will have no problem giving her self insulin. Practiced with saline pens and she was able to do the skills but with extra practice. Pt was given and taught to use a Vivi contour next meter. Pt is giving her insulin with nursing.  Reviewed type two diabetes, what effect blood sugars, need to eat 3 meals with controlled carbs and protein and why.   Plan: CDE to meet with pt early tomorrow to complete education, to include hyper and hypoglycemia. [facility] DM book given for her to review. CDE may need to review skills again. Please have pt give her insulin with nursing.  Pt not ready for discharge as she has had /given only one shot, and blood sugars remain above 300, long acting not yet started, and skills need a little more time. At discharge she will need prescriptions for Vivi contour next test strips and lancets to test 3 x day ( max for medicare), Lantus solostar pen, ( if long acting ordered) and Novolog flex pens with delbert pen needles. Please use dm supplies order set F2 for correct meter supplies ( no meter), and dx code. Please text via Voatle if needs change.

## 2021-08-05 NOTE — PROGRESS NOTES
Patient had difficulties checking own blood sugar, required step by step instructions by RN. Blood glucose level 282, able to verbalize level was high and insulin was required.

## 2021-08-05 NOTE — DISCHARGE SUMMARY
Discharge Summary    CHIEF COMPLAINT ON ADMISSION  Chief Complaint   Patient presents with   • Sent from Urgent Care   • Hyperglycemia   • Abnormal EKG       Reason for Admission  Sent by MD     Admission Date  8/3/2021    CODE STATUS  Full Code    HPI & HOSPITAL COURSE  Ailyn Saavedra is a 81 y.o. female who presented 8/3/2021 with chest heaviness. She was admitted for chest pain rule out. She underwent cardiac stress test which was negative for reversible ischemia. Echocardiogram jeffrey as was EKG and telemetry monitoring. Patient's chest pain resolved spontaneously. Patient was noted to have new diagnosis of uncontrolled type 2 diabetes, with A1c 12. She required some insulin sliding scale to manage her blood sugar. Diabetes educator spoke with patient with multiple visits regarding diabetes diagnosis and insulin use. Patient is discharged with diabetes supplies and novolog insulin pen for sliding scale use with meals. She is to follow up with PCP for continued management of her diabetes.    Therefore, she is discharged in good and stable condition to home with close outpatient follow-up.    Discharge Date  8/5/2021    FOLLOW UP ITEMS POST DISCHARGE  Take medications as prescribed.  Follow up with PCP for continued diabetes management.    DISCHARGE DIAGNOSES  Principal Problem:    Pain in the chest POA: Yes  Active Problems:    Cancer of skin of eyelid (Chronic) POA: Yes      Overview: Lower left eyelid, 70% of lid removed.    History of breast cancer (Chronic) POA: Yes    Hyperglycemia POA: Yes  Resolved Problems:    * No resolved hospital problems. *      FOLLOW UP  Future Appointments   Date Time Provider Department Center   8/26/2021  3:00 PM Heidi Medrano M.D. [facility] M.D.  [address]  [address] [place] [id number]  [phone]            MEDICATIONS ON DISCHARGE     Medication List      START taking these medications      Instructions   Alcohol Swabs Pads   Doctor's  comments: Per formulary preference. ICD-10 code: E11.65 Uncontrolled type 2 Diabetes Mellitus  Wipe site with prep pad prior to injection.     BD Pen Needle Lety U/F  Generic drug: Insulin Pen Needle 32 G x 4 mm   Doctor's comments: Per patient/formulary preference. ICD-10 code: E11.65 Uncontrolled type 2 Diabetes Mellitus  Use one pen needle in pen device to inject insulin three times daily.     Contour Next Test strip  Generic drug: glucose blood   Doctor's comments: Or per formulary preference. ICD-10 code: E11.65 Uncontrolled type 2 Diabetes Mellitus  Use one Vivi Contour Next strip to test blood sugar three times daily before meals.     Microlet Lancets Misc   Doctor's comments: Or per formulary preference. ICD-10 code: E11.65 Uncontrolled type 2 Diabetes Mellitus  Use one lancet to Test blood sugar three times daily before meals.     NovoLOG FlexPen 100 UNIT/ML injection PEN  Generic drug: insulin aspart   Inject 0-6 units 3 times daily before meals per sliding scale.For glucose: 70-150mg/dL =0 Units;151 - 200 mg/dL=1 Unit;201 - 250 mg/dL =2 Units;251 - 300mg/dL=3 Units;301 - 350mg/dL = 4 Units;351 - 400 mg/dL =5 Units;Over 400 mg/dL = 6 Units; Over 400 mg/dL x 2 consecutive FSBG = 6 Units and call MD        CONTINUE taking these medications      Instructions   aspirin EC 81 MG Tbec  Commonly known as: ECOTRIN   Take 162 mg by mouth one time. 2 tabs = 162 mg  Dose: 162 mg            Allergies  No Known Allergies    DIET  Orders Placed This Encounter   Procedures   • Diet Order Diet: Consistent CHO (Diabetic); Second Modifier: (optional): Cardiac     Standing Status:   Standing     Number of Occurrences:   1     Order Specific Question:   Diet:     Answer:   Consistent CHO (Diabetic) [4]     Order Specific Question:   Second Modifier: (optional)     Answer:   Cardiac [6]       ACTIVITY  As tolerated.  Weight bearing as tolerated    CONSULTATIONS  none    PROCEDURES  Cardiac stress test    LABORATORY  Lab  Results   Component Value Date    SODIUM 132 (L) 08/04/2021    POTASSIUM 3.7 08/04/2021    CHLORIDE 100 08/04/2021    CO2 20 08/04/2021    GLUCOSE 293 (H) 08/04/2021    BUN 15 08/04/2021    CREATININE 0.97 08/04/2021        Lab Results   Component Value Date    WBC 7.9 08/03/2021    WBC 9.5 10/31/2010    HEMOGLOBIN 16.7 (H) 08/03/2021    HEMATOCRIT 48.0 (H) 08/03/2021    PLATELETCT 278 08/03/2021        Total time of the discharge process exceeds 34 minutes.

## 2021-08-05 NOTE — PROGRESS NOTES
Patient discharged home with friend via wheelchair. Discharge information and packet given. Sent home with all belongings and supplies. PIV removed.

## 2021-08-05 NOTE — CARE PLAN
The patient is Stable - Low risk of patient condition declining or worsening    Shift Goals  Clinical Goals: Cardiac Workup/Diabetic education   Patient Goals: Discharge  Family Goals: NA    Progress made toward(s) clinical / shift goals:  Not progressing, need reinforcement with DM diagnosis and education     Patient is not progressing towards the following goals:      Problem: Knowledge Deficit - Standard  Goal: Patient and family/care givers will demonstrate understanding of plan of care, disease process/condition, diagnostic tests and medications  Outcome: Not Progressing

## 2021-08-05 NOTE — PROGRESS NOTES
Assumed care of patient. Chart review done. Patient AAOx4. Diabetes education provided. Plan to [place] today. Bed low and locked.

## 2021-08-05 NOTE — PROGRESS NOTES
Diabetes education: Met with pt x 2 this afternoon. Please see consult note.  Plan: CDE to meet with pt early tomorrow to complete education, to include hyper and hypoglycemia. [facility] DM book given for her to review. CDE may need to review skills again. Please have pt give her insulin with nursing.  Pt not ready for discharge as she has had /given only one shot, and blood sugars remain above 300, long acting not yet started, and skills need a little more time. At discharge she will need prescriptions for Vivi contour next test strips and lancets to test 3 x day ( max for medicare), Lantus solostar pen, ( if long acting ordered) and Novolog flex pens with delbert pen needles. Please use dm supplies order set F2 for correct meter supplies ( no meter), and dx code. Please text via Voatle if needs change.

## 2021-08-05 NOTE — PROGRESS NOTES
Pt successfully completed fingerstick and insulin administration with RN verbal instruction. Still having trouble with order of steps.

## 2021-08-05 NOTE — PROGRESS NOTES
"Hospital Medicine Daily Progress Note    Date of Service  8/4/2021    Chief Complaint  Ailyn Saavedra is a 81 y.o. female admitted 8/3/2021 with abnormal EKG, hyperglycemia    Hospital Course  Ailyn Saavedra is a 81 y.o. female who presented 8/3/2021 with chest heaviness. Patient states that she's had some chest tightness since last Friday not associated with any exercise. She plays tennis regularly and did not have any pain then however upon returning home, she felt extremely tired and sob. She had the chest heaviness again when she woke up this morning, took 2 tabs of baby aspirin without any relief. She had stress test done 4 years ago due to angina and was told her \"heart was inflamed.\" Patient also reports that she is not a diabetic though her glucose was elevated on arrival to ED. Patient states that she still has some ongoing chest pain. With troponin elevation and symptoms, patient admitted for observation to rule out ACS.       Interval Problem Update  Today the patient is sitting up in her bed, pleasant and cooperative.  She is fully alert and oriented.  Her chest pain has completely resolved, however she still feels occasionally tight across her chest.  She denies headache, dizziness, nausea, fevers and any other problems.  -Hemoglobin A1c is 12.3%, new diagnosis diabetes 2.  Completed diabetes education.  -Continue fingersticks and evaluate effectiveness of sliding scale insulin therapy  -Troponins, echocardiogram and stress test within normal limits  -Transfer to [facility]    I have personally seen and examined the patient at bedside. I discussed the plan of care with patient, bedside RN, charge RN, , dietitian and Dr. Holden, diabetes educator.    Consultants/Specialty  none    Code Status  Full Code    Disposition  Patient is medically cleared pending blood glucose management.   Anticipate discharge to to home with close outpatient follow-up.  I have placed the appropriate orders " for post-discharge needs.    Review of Systems  Review of Systems   Constitutional: Positive for malaise/fatigue. Negative for chills and fever.   Respiratory: Negative for cough and shortness of breath.    Cardiovascular: Negative for chest pain and leg swelling.   Gastrointestinal: Negative for abdominal pain, constipation, diarrhea, heartburn, nausea and vomiting.   Genitourinary: Negative for dysuria, frequency and urgency.   Musculoskeletal: Negative for back pain and myalgias.   Neurological: Negative for dizziness, weakness and headaches.   Psychiatric/Behavioral: Negative.         Physical Exam  Temp:  [35.7 °C (96.3 °F)-36.9 °C (98.4 °F)] 36.6 °C (97.9 °F)  Pulse:  [] 74  Resp:  [15-21] 16  BP: (118-203)/() 157/72  SpO2:  [89 %-96 %] 94 %    Physical Exam  Vitals and nursing note reviewed.   Constitutional:       General: She is awake. She is not in acute distress.     Appearance: Normal appearance. She is normal weight. She is not ill-appearing.   HENT:      Head: Normocephalic and atraumatic.      Mouth/Throat:      Mouth: Mucous membranes are moist.      Pharynx: Oropharynx is clear.   Eyes:      General: No scleral icterus.     Extraocular Movements: Extraocular movements intact.      Pupils: Pupils are equal, round, and reactive to light.      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      Pulses: Normal pulses.      Heart sounds: Normal heart sounds. No murmur heard.     Pulmonary:      Effort: Pulmonary effort is normal.      Breath sounds: Normal breath sounds. No rhonchi.   Abdominal:      General: Abdomen is flat. Bowel sounds are normal. There is no distension.      Palpations: Abdomen is soft.      Tenderness: There is no abdominal tenderness. There is no guarding.      Comments: S/p left breast resection   Musculoskeletal:         General: No swelling or  tenderness. Normal range of motion.      Cervical back: Normal range of motion and neck supple. No rigidity or tenderness.   Skin:     General: Skin is warm and dry.      Coloration: Skin is not jaundiced.   Neurological:      General: No focal deficit present.      Mental Status: She is alert and oriented to person, place, and time. Mental status is at baseline.      Cranial Nerves: No cranial nerve deficit.      Motor: No weakness.   Psychiatric:         Attention and Perception: Attention normal.         Mood and Affect: Mood normal.         Speech: Speech normal.         Behavior: Behavior normal. Behavior is cooperative.         Thought Content: Thought content normal.         Judgment: Judgment normal.         Fluids    Intake/Output Summary (Last 24 hours) at 8/4/2021 1705  Last data filed at 8/4/2021 0700  Gross per 24 hour   Intake 503.75 ml   Output --   Net 503.75 ml       Laboratory  Recent Labs     08/03/21 1911   WBC 7.9   RBC 5.33   HEMOGLOBIN 16.7*   HEMATOCRIT 48.0*   MCV 90.1   MCH 31.3   MCHC 34.8   RDW 40.3   PLATELETCT 278   MPV 10.7     Recent Labs     08/03/21 1911 08/04/21  0020   SODIUM 131* 132*   POTASSIUM 4.1 3.7   CHLORIDE 96 100   CO2 16* 20   GLUCOSE 367* 293*   BUN 18 15   CREATININE 1.13 0.97   CALCIUM 10.0 9.2             Recent Labs     08/04/21  0020   TRIGLYCERIDE 87   HDL 49   *       Imaging  NM-CARDIAC STRESS TEST   Final Result      EC-ECHOCARDIOGRAM COMPLETE W/O CONT   Final Result      DX-CHEST-PORTABLE (1 VIEW)   Final Result      No acute cardiopulmonary abnormality identified.           Assessment/Plan  * Pain in the chest- (present on admission)  Assessment & Plan  -Tele monitor, only rare PVCs  -Trop trend, now within normal limits  -EKG trend, no ischemic findings  -ECHO within normal limits  -Stress test: No evidence of significant jeopardized viable myocardium or prior myocardial infarction. Normal left ventricular size, ejection fraction, and wall  motion.  -ASA 81mg  -Lipid profile:   Cholesterol,Tot   Date Value Ref Range Status   2021 181 100 - 199 mg/dL Final   2016 191 100 - 199 mg/dL Final     Triglycerides   Date Value Ref Range Status   2021 87 0 - 149 mg/dL Final   2016 97 0 - 149 mg/dL Final     HDL   Date Value Ref Range Status   2021 49 >=40 mg/dL Final   2016 50 >=40 mg/dL Final     LDL   Date Value Ref Range Status   2021 115 (H) <100 mg/dL Final   2016 122 (H) <100 mg/dL Final      -Atorvastatin 40mg qhs  resolved    Hyperglycemia- (present on admission)  Assessment & Plan  -previous glucose readings are consistently high  -HbA1C: 12.3%, new diagnosis diabetes 2  -Insulin SS    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   Normal left ventricular size, ejection fraction, and wall motion.     CANELO AGUAYO     MRN:    [id number]         Gender:    F     Exam Date: 2021 06:45     Exam Location:      Inpatient     Ordering Phys:     JUDIE RODRIGUEZ     NucMed Tech:       Evie Hannah RT                      (N), CT     Age:    81    :    1940        Ht (in):     66     Wt (lb):     194    BMI:    31.11       Radiologist     Risk Factors:             Hypertension, Diabetes, Family history of coronary                              disease     Indications:              Abnormal electrocardiogram ECG EKG     ICD Codes:                R94.31     Cardiac History:          Abnormal resting ECG, Chest Pain     Cardiac Meds:     Meds Past 24 hrs:     Pretest Chest Pain:       No symptoms     STRESS TEST      Pharmacologi                    c   Protoc   Lexiscan        Dose: 0.4 mg   ol:     Post-Injection Exercise:        An additional 1 minutes of exercise followed   the                                    intravenous injection     Resting HR (bpm):      68     Peak HR (bpm):         91     Resting BP (mmHg):       118    /   81     Peak BP (mmHg):       161   /   75     MaxPHR:     138     Target HR (bpm):       117     % MaxPHR:     66     Double Product:       79886     BP Response:     Stress Termination:       Protocol completed     Stress Symptoms:   No chest pain or symptoms noted     ECG     Resting ECG:     Stress ECG:     IMAGE PROTOCOL      Rest/Stress 1                        Day             RadiopharmaceuticalDose (mCi)   Imaging  Date      Imaging  Time           Inj to Img Time (min)   Rest:   Tc-99m             7.03         04-Aug-2021        10:09                   25           Tetrofosmin   Stress: Tc-99m             26.5         04-Aug-2021        11:38                   30           Tetrofosmin     Rest:   Administration Site:       Right antecubital                               fossa   Administered by:      Evie Camden RT(N), CT     Stress:   Administration Site:       Right antecubital                               fossa   Administered by:      Evie Camden RT(N), CT     % Percent HR Achieved:   SPECT RESULTS     Technical Quality:       Excellent     Raw Data Analysis:   Summed Stress Score:    5   Summed Rest Score:    9   Summed Difference Score:        2   PERFUSION:   Normal myocardial perfusion with no ischemia.     FUNCTIONAL RESULTS (calculated via Gated SPECT)     Stress Image LV EF:        73     %     Upper Normal Limit     Stress EDV:      63     ml   EDVI:    32      ml/m2     Stress ESV:      17     ml   ESVI:    9       ml/m2     TID:    1.23   TID - 1.19      TID (ed) - 1.23   LV Function:   Normal left ventricular wall motion.  LV ejection fraction = 73%.     Pedro Hart   (Electronically Signed)   Final Date:      04 August 2021                      13:17  EC-ECHOCARDIOGRAM COMPLETE W/O CONT  Transthoracic  Echo Report    Echocardiography Laboratory    CONCLUSIONS  Normal left ventricular systolic function.  Normal regional wall motion.  The right ventricle was normal in size and function.  Normal pericardium without effusion.  The aortic root is normal.    CANELO AGUAYO  Exam Date:         2021                      07:04  Exam Location:     Inpatient  Priority:          Routine    Ordering Physician:        JUDIE RODRIGUEZ  Referring Physician:       [id number] Horton  Sonographer:               Disha Salmeron                              MAGDALENO, [facility]    Age:    81     Gender:    F  MRN:    [id number]  :    1940  BSA:    1.96   Ht (in):    66     Wt (lb):    190  Exam Type:     Complete    Indications:     Chest Pain  ICD Codes:       786.5    CPT Codes:       44608    BP:   139    /   84     HR:   65  Technical Quality:       Good    MEASUREMENTS  (Male / Female) Normal Values  2D ECHO  LV Diastolic Diameter PLAX        4.8 cm                4.2 - 5.9 / 3.9 - 5.3   cm  LV Systolic Diameter PLAX         3.2 cm                2.1 - 4.0 cm  IVS Diastolic Thickness           0.98 cm                 LVPW Diastolic Thickness          0.87 cm                 LVOT Diameter                     2 cm                    Estimated LV Ejection Fraction    65 %                    LV Ejection Fraction MOD BP       63.9 %                >= 55  %  LV Ejection Fraction MOD 4C       62.5 %                  LV Ejection Fraction MOD 2C       65.7 %                    DOPPLER  AV Peak Velocity                  1.2 m/s                 AV Peak Gradient                  6.2 mmHg                AV Mean Gradient                  3.5 mmHg                LVOT Peak Velocity                1.1 m/s                 AV Area Cont Eq vti               2.8 cm2                 Mitral E Point Velocity           0.53 m/s                Mitral E to A Ratio                0.64                    Mitral A Duration                 145 ms                  MV Pressure Half Time             79.5 ms                 MV Area PHT                       2.8 cm2                 MV Deceleration Time              274 ms                  TR Peak Velocity                  239 cm/s                PV Peak Velocity                  0.79 m/s                PV Peak Gradient                  2.5 mmHg                RVOT Peak Velocity                0.6 m/s                   * Indicates values subject to auto-interpretation  LV EF:  65    %    FINDINGS  Left Ventricle  The left ventricle was normal in size and thickness. Normal left   ventricular systolic function. Normal regional wall motion. Left   ventricular ejection fraction is visually estimated to be 65%. Normal   diastolic function.    Right Ventricle  The right ventricle was normal in size and function.    Right Atrium  The right atrium is normal in size.  Normal inferior vena cava size and   inspiratory collapse.    Left Atrium  The left atrium is normal in size.  Left atrial volume index is 19   mL/sq m.    Mitral Valve  Structurally normal mitral valve without significant stenosis. Trace   mitral regurgitation.    Aortic Valve  Structurally normal aortic valve without significant stenosis or   regurgitation.    Tricuspid Valve  Structurally normal tricuspid valve without significant stenosis. Trace   tricuspid regurgitation. Estimated right ventricular systolic pressure    is 25 mmHg.    Pulmonic Valve  Structurally normal pulmonic valve without significant stenosis. Trace   pulmonic insufficiency.    Pericardium  Normal pericardium without effusion.    Aorta  The aortic root is normal.  Ascending aorta diameter is 3.6 cm.    Cecil Segovia MD  (Electronically Signed)  Final Date:     04 August 2021                   10:09  Amended:        04 August 2021 10:31      VTE prophylaxis: enoxaparin ppx    I have performed a physical exam and  reviewed and updated ROS and Plan today (8/4/2021). In review of yesterday's note (8/3/2021), there are no changes except as documented above.    KAY Asher.

## 2021-08-05 NOTE — DISCHARGE INSTRUCTIONS
Discharge Instructions    Discharged to home by car with friend. Discharged via wheelchair, hospital escort: Yes.  Special equipment needed: Not Applicable    Be sure to schedule a follow-up appointment with your primary care doctor or any specialists as instructed.     Discharge Plan:   Diet Plan: Discussed  Activity Level: Discussed  Confirmed Follow up Appointment: Appointment Scheduled  Confirmed Symptoms Management: Discussed  Medication Reconciliation Updated: Yes    I understand that a diet low in cholesterol, fat, and sodium is recommended for good health. Unless I have been given specific instructions below for another diet, I accept this instruction as my diet prescription.   Other diet: Carb controlled    Special Instructions: None    · Is patient discharged on Warfarin / Coumadin?   No     Depression / Suicide Risk    As you are discharged from this [facility] facility, it is important to learn how to keep safe from harming yourself.    Recognize the warning signs:  · Abrupt changes in personality, positive or negative- including increase in energy   · Giving away possessions  · Change in eating patterns- significant weight changes-  positive or negative  · Change in sleeping patterns- unable to sleep or sleeping all the time   · Unwillingness or inability to communicate  · Depression  · Unusual sadness, discouragement and loneliness  · Talk of wanting to die  · Neglect of personal appearance   · Rebelliousness- reckless behavior  · Withdrawal from people/activities they love  · Confusion- inability to concentrate     If you or a loved one observes any of these behaviors or has concerns about self-harm, here's what you can do:  · Talk about it- your feelings and reasons for harming yourself  · Remove any means that you might use to hurt yourself (examples: pills, rope, extension cords, firearm)  · Get professional help from the community (Mental Health, Substance Abuse, psychological counseling)  · Do  not be alone:Call your Safe Contact- someone whom you trust who will be there for you.  · Call your local CRISIS HOTLINE 620-5600 or 603-527-0042  · Call your local Children's Mobile Crisis Response Team [place] (614) 563-3762 or www.efw-suhl  · Call the toll free National Suicide Prevention Hotlines   · National Suicide Prevention Lifeline 623-588-BTYW (2877)  · National Hope Line Network 800-SUICIDE (214-4350)      Insulin Injection Instructions, Using Insulin Pens, Adult  A subcutaneous injection is a shot of medicine that is injected into the layer of fat and tissue between skin and muscle. People with type 1 diabetes must take insulin because their bodies do not make it. People with type 2 diabetes may need to take insulin.  There are many different types of insulin. The type of insulin that you take may determine how many injections you give yourself and when you need to give the injections.  Supplies needed:  · Soap and water to wash hands.  · Your insulin pen.  · A new, unused needle.  · Alcohol wipes.  · A disposal container that is meant for sharp items (sharps container), such as an empty plastic bottle with a cover.  How to choose a site for injection  The body absorbs insulin differently, depending on where the insulin is injected (injection site). It is best to inject insulin into the same body area each time (for example, always in the abdomen), but you should use a different spot in that area for each injection. Do not inject the insulin in the same spot each time. There are five main areas that can be used for injecting. These areas include:  · Abdomen. This is the preferred area.  · Front of thigh.  · Upper, outer side of thigh.  · Upper, outer side of arm.  · Upper, outer part of buttock.  How to use an insulin pen    First, follow the steps for Get ready, then continue with the steps for Inject the insulin.  Get ready  1. Wash your hands with soap and water. If soap and water are  not available, use hand .  2. Before you give yourself an insulin injection, be sure to test your blood sugar level (blood glucose level) and write down that number. Follow any instructions from your health care provider about what to do if your blood glucose level is higher or lower than your normal range.  3. Check the expiration date and the type of insulin that is in the pen.  4. If you are using CLEAR insulin, check to see that it is clear and free of clumps.  5. If you are using CLOUDY insulin, do not shake the pen to get the injection ready. Instead, get it ready in one of these ways:  ? Gently roll the pen between your palms several times.  ? Tip the pen up and down several times.  6. Remove the cap from the insulin pen.  7. Use an alcohol wipe to clean the rubber tip of the pen.  8. Remove the protective paper tab from the disposable needle. Do not let the needle touch anything.  9. Screw a new, unused needle onto the pen.  10. Remove the outer plastic needle cover. Do not throw away the outer plastic cover yet.  ? If the pen uses a special safety needle, leave the inner needle shield in place.  ? If the pen does not use a special safety needle, remove the inner plastic cover from the needle.  11. Follow the 's instructions to prime the insulin pen with the volume of insulin needed. Hold the pen with the needle pointing up, and push the button on the opposite end of the pen until a drop of insulin appears at the needle tip. If no insulin appears, repeat this step.  12. Turn the button (dial) to the number of units of insulin that you will be injecting.  Inject the insulin  1. Use an alcohol wipe to clean the site where you will be injecting the needle. Let the site air-dry.  2. Hold the pen in the palm of your writing hand like a pencil.  3. If directed by your health care provider, use your other hand to pinch and hold about an inch (2.5 cm) of skin at the injection site. Do not  directly touch the cleaned part of the skin.  4. Gently but quickly, use your writing hand to put the needle straight into the skin. The needle should be at a 90-degree angle (perpendicular) to the skin.  5. When the needle is completely inserted into the skin, use your thumb or index finger of your writing hand to push the top button of the pen down all the way to inject the insulin.  6. Let go of the skin that you are pinching. Continue to hold the pen in place with your writing hand.  7. Wait 10 seconds, then pull the needle straight out of the skin. This will allow all of the insulin to go from the pen and needle into your body.  8. Carefully put the larger (outer) plastic cover of the needle back over the needle, then unscrew the capped needle and discard it in a sharps container, such as an empty plastic bottle with a cover.  9. Put the plastic cap back on the insulin pen.  How to throw away supplies  · Discard all used needles in a puncture-proof sharps disposal container. You can ask your local pharmacy about where you can get this kind of disposal container, or you can use an empty plastic liquid laundry detergent bottle that has a cover.  · Follow the disposal regulations for the area where you live. Do not use any needle more than one time.  · Throw away empty disposable pens in the regular trash.  Questions to ask your health care provider  · How often should I be taking insulin?  · How often should I check my blood glucose?  · What amount of insulin should I be taking at each time?  · What are the side effects?  · What should I do if my blood glucose is too high?  · What should I do if my blood glucose is too low?  · What should I do if I forget to take my insulin?  · What number should I call if I have questions?  Where to find more information  · American Diabetes Association (ADA): www.diabetes.org  · American Association of Diabetes Educators (AADE) Patient Resources:  https://www.diabeteseducator.org  Summary  · A subcutaneous injection is a shot of medicine that is injected into the layer of fat and tissue between skin and muscle.  · Before you give yourself an insulin injection, be sure to test your blood sugar level (blood glucose level) and write down that number.  · Check the expiration date and the type of insulin that is in the pen. The type of insulin that you take may determine how many injections you give yourself and when you need to give the injections.  · It is best to inject insulin into the same body area each time (for example, always in the abdomen), but you should use a different spot in that area for each injection.  This information is not intended to replace advice given to you by your health care provider. Make sure you discuss any questions you have with your health care provider.  Document Released: 01/20/2017 Document Revised: 01/07/2019 Document Reviewed: 01/20/2017  [employer] Patient Education © 2020 [employer]    Monitoring for Diabetes  There are two blood tests that help you monitor and manage your diabetes. These include:  · An A1c (hemoglobin A1c) test.  · This test is an average of your glucose (or blood sugar) control over the past 3 months.  · This is recommended as a way for you and your caregiver to understand how well your glucose levels are controlled on the average.  · Your A1c goal will be determined by your caregiver, but it is usually best if it is less than 6.5% to 7.0%.  · Glucose (sugar) attaches itself to red blood cells. The amount of glucose then can then be measured. The amount of glucose on the cells depends on how high your blood glucose has been.  · SMBG test (self-monitoring blood glucose).  · Using a blood glucose monitor (meter) to do SMBG testing is an easy way to monitor the amount of glucose in your blood and can help you improve your control. The monitor will tell you what your blood glucose is at that very moment.  Every person with diabetes should have a blood glucose monitor and know how to use it. The better you control your blood sugar on a daily basis, the better your A1c levels will be.  HOW OFTEN SHOULD I HAVE AN A1C LEVEL?  · Every 3 months if your diabetes is not well controlled or if therapy has changed.  · Every 6 months if you are meeting your treatment goals.  HOW OFTEN SHOULD I DO SMBG TESTING?   Your caregiver will recommend how often you should test. Testing times are based on the kind of medicine you take, type of diabetes you have, and your blood glucose control. Testing times can include:  · Type 1 diabetes: test 3 or 4 times a day or as directed.  · Type 2 diabetes and if you are taking insulin and diabetes pills: test 3 or 4 times a day or as directed.  · If you are taking diabetes pills only and not reaching your target A1c: test 2 to 4 times a day or as directed.  · If you are taking diabetes pills and are controlling your diabetes well with diet and exercise, your caregiver will help you decide what is appropriate.  WHAT TIME OF DAY SHOULD I TEST?   The best time of day to test your blood glucose depends on medications, mealtimes, exercise, and blood glucose control. It is best to test at different times because this will help you know how you are doing throughout the day. Your caregiver will help you decide what is best.  WHAT SHOULD MY BLOOD GLUCOSE BE?  Blood glucose target goals may vary depending on each persons needs, whether they have type 1 or type 2 diabetes or what medications they are taking. However, as a general rule, blood glucose should be:  · Before meals    mg/dl.  · After meals    ..less than 180 mg/dl.  CHECK YOUR BLOOD GLUCOSE IF:  · You have symptoms of low blood sugar (hypoglycemia), which may include dizziness, shaking, sweating, chills and confusion.  · You have symptoms of high blood sugar (hyperglycemia), which may include sleepiness, blurred vision, frequent urination  and excessive thirst.  · You are learning how meals, physical activity and medicine affect your blood glucose level. The more you learn about how various foods, your medications, and activities affect you, the better job you will do of taking care of yourself.  · You have a job in which poor control could cause safety problems while driving or operating machinery.  CHECK YOUR BLOOD SUGAR MORE FREQUENTLY:  · If you have medication or dietary changes.  · If you begin taking other kinds of medicines.  · If you become sick or your level of stress increases. With an illness, your blood sugar may even be high without eating.  · Before and after exercise.  Follow your caregiver's testing recommendations during this time.   TO DISPOSE OF SHARPS:  Each city or state may have different regulations. Check with your public works or waste management department.  · Sharps containers can be purchased from pharmacies.  · Place all used sharps in a container. You do not need to replace any protective covers over the needle or break the needle.  · Sharps should be contained in a ridge, leakproof, puncture-resistant container.  · Plastic detergent bottle.  · Bleach bottle.  · When container is almost full, add a solution that is 1 part laundry bleach and 9 parts tap water (it is okay to use undiluted bleach if you wish). You may want to wear gloves since bleach can damage tissue. Let the solution sit for 30 minutes.  · Carefully pour all the liquid into the sanitary . Be sure to prevent the sharps from falling out.  · Once liquid is drained, reseal the container with lid and tape it shut with duct tape. This will prevent the cap from coming off.  · Dispose of the container with your regular household trash and waste. It is a good idea to let your  know that you will be disposing of sharps.  Document Released: 12/20/2004 Document Revised: 09/11/2013 Document Reviewed: 06/21/2010  [employer]® Patient Information ©2014  [facility].

## 2021-08-05 NOTE — DISCHARGE PLANNING
Meds-to-Beds: Discharge prescription orders listed below delivered to patient's bedside. RN Farideh notified. Patient counseled. Patient elected to have co-payment billed to patient account.     Patient had glucometer at bedside given by MADHU.      Ailyn Saavedra   Home Medication Instructions SENDY:[id number]    Printed on:08/05/21 0443   Medication Information                      Alcohol Swabs  Wipe site with prep pad prior to injection.             glucose blood strip  Use one Vivi Contour Next strip to test blood sugar three times daily before meals.             insulin aspart (NOVOLOG) 100 UNIT/ML injection PEN  Inject 0-6 units 3 times daily before meals per sliding scale.For glucose: 70-150mg/dL =0 Units;151 - 200 mg/dL=1 Unit;201 - 250 mg/dL =2 Units;251 - 300mg/dL=3 Units;301 - 350mg/dL = 4 Units;351 - 400 mg/dL =5 Units;Over 400 mg/dL = 6 Units; Over 400 mg/dL x 2 consecutive FSBG = 6 Units and call MD             Insulin Pen Needle 32 G x 4 mm  Use one pen needle in pen device to inject insulin three times daily.             Lancets (MICROLET)  Use one lancet to Test blood sugar three times daily before meals.               Jeni Ceballos, PharmD

## 2021-08-06 NOTE — PROGRESS NOTES
Diabetes education: text sent to MD regarding lantus vs novolog. MD continued with Novolog at discharge. Pt seen before discharge. Pt needs frequent reinforcement on diabetes skills but states her son has diabetes, uses insulin pens and does finger sticks so he can help her. Handouts in large print given on using a meter/ finger sticks and using insulin pens, and reviewed. Reviewed pens and meter again. Pt was able to give her lunch time insulin but took it out quickly when asked to count to 10 (counting just  for practice as it was a syringe and she did give all of the insulin). Reviewed low blood sugar and need to eat protein with her controlled carbs.

## 2021-08-09 ENCOUNTER — TELEPHONE (OUTPATIENT)
Dept: HEALTH INFORMATION MANAGEMENT | Facility: OTHER | Age: 81
End: 2021-08-09

## 2021-08-09 NOTE — TELEPHONE ENCOUNTER
"Outreach call to mbr to follow up on hospital follow up. She reported \"I'm going fine\".  Mbr reported she was started on insulin during this admissions. She has a hospital follow up on 8/26/21. Discussed [employer] benefits and discussed benefits given pt recently started on insulin. Mbr reported she is familiar with insulin as her son is on insulin. She reported she feels she can wait until her provider visit on 8/26/21. Lastly discussed Advanced Directives. She reported she believes she completed the document with her Trust. LSW encouraged mbr to bring in copy with her to her PCP appt. Mbr voiced understanding. LSW provided contact number to mbr to follow up if needed.   "

## 2021-08-26 ENCOUNTER — OFFICE VISIT (OUTPATIENT)
Dept: MEDICAL GROUP | Facility: MEDICAL CENTER | Age: 81
End: 2021-08-26
Payer: MEDICARE

## 2021-08-26 VITALS
WEIGHT: 187.39 LBS | BODY MASS INDEX: 28.4 KG/M2 | RESPIRATION RATE: 16 BRPM | HEART RATE: 74 BPM | TEMPERATURE: 97 F | DIASTOLIC BLOOD PRESSURE: 60 MMHG | SYSTOLIC BLOOD PRESSURE: 108 MMHG | OXYGEN SATURATION: 94 % | HEIGHT: 68 IN

## 2021-08-26 DIAGNOSIS — Z63.79 STRESS DUE TO ILLNESS OF FAMILY MEMBER: ICD-10-CM

## 2021-08-26 DIAGNOSIS — N95.9 MENOPAUSAL AND POSTMENOPAUSAL DISORDER: ICD-10-CM

## 2021-08-26 DIAGNOSIS — Z85.3 HISTORY OF BREAST CANCER: Chronic | ICD-10-CM

## 2021-08-26 DIAGNOSIS — Z23 NEED FOR VACCINATION: ICD-10-CM

## 2021-08-26 DIAGNOSIS — D58.2 ELEVATED HEMOGLOBIN (HCC): ICD-10-CM

## 2021-08-26 DIAGNOSIS — R53.83 OTHER FATIGUE: ICD-10-CM

## 2021-08-26 DIAGNOSIS — Z79.4 TYPE 2 DIABETES MELLITUS WITH OTHER SPECIFIED COMPLICATION, WITH LONG-TERM CURRENT USE OF INSULIN (HCC): ICD-10-CM

## 2021-08-26 DIAGNOSIS — E11.69 TYPE 2 DIABETES MELLITUS WITH OTHER SPECIFIED COMPLICATION, WITH LONG-TERM CURRENT USE OF INSULIN (HCC): ICD-10-CM

## 2021-08-26 DIAGNOSIS — Z12.31 ENCOUNTER FOR SCREENING MAMMOGRAM FOR BREAST CANCER: ICD-10-CM

## 2021-08-26 DIAGNOSIS — R79.89 ABNORMAL CBC: ICD-10-CM

## 2021-08-26 PROBLEM — E11.9 DIABETES MELLITUS (HCC): Status: ACTIVE | Noted: 2021-08-26

## 2021-08-26 PROCEDURE — 90750 HZV VACC RECOMBINANT IM: CPT | Performed by: FAMILY MEDICINE

## 2021-08-26 PROCEDURE — 90471 IMMUNIZATION ADMIN: CPT | Performed by: FAMILY MEDICINE

## 2021-08-26 PROCEDURE — 99214 OFFICE O/P EST MOD 30 MIN: CPT | Mod: 25 | Performed by: FAMILY MEDICINE

## 2021-08-26 ASSESSMENT — FIBROSIS 4 INDEX: FIB4 SCORE: 0.99

## 2021-08-26 NOTE — ASSESSMENT & PLAN NOTE
Son moved in with her he is struggling with ETOH currently   Her daughter on chemotherapy x 6 years for metastatic breast cancer

## 2021-08-26 NOTE — ASSESSMENT & PLAN NOTE
Elevated h/h  I suggest we repeat this as well as initate hypoxia work up pfts cxr and sleep study

## 2021-08-26 NOTE — PROGRESS NOTES
This medical record contains text that has been entered with the assistance of computer voice recognition and dictation software.  Therefore, it may contain unintended errors in text, spelling, punctuation, or grammar        Chief Complaint   Patient presents with   • Follow-Up     ER [facility] and diagnosed with type 2 diabetes       Ailyn Saavedra is a 81 y.o. female here evaluation and management of:     ER follow up diagnosed with dm   Review labs including elevated H/H  Has been > 1year since we have seen each other nearly 2 years     Current Outpatient Medications   Medication Sig Dispense Refill   • glucose blood strip Use one Vivi Contour Next strip to test blood sugar three times daily before meals. 100 Strip 0   • Lancets Use one lancet to Test blood sugar three times daily before meals. 100 Each 0   • Alcohol Swabs Wipe site with prep pad prior to injection. 100 Each 0   • Insulin Pen Needle 32 G x 4 mm Use one pen needle in pen device to inject insulin three times daily. 100 Each 0   • insulin aspart (NOVOLOG) 100 UNIT/ML injection PEN Inject 0-6 units 3 times daily before meals per sliding scale.For glucose: 70-150mg/dL =0 Units;151 - 200 mg/dL=1 Unit;201 - 250 mg/dL =2 Units;251 - 300mg/dL=3 Units;301 - 350mg/dL = 4 Units;351 - 400 mg/dL =5 Units;Over 400 mg/dL = 6 Units; Over 400 mg/dL x 2 consecutive FSBG = 6 Units and call MD 6 mL 2   • aspirin EC (ECOTRIN) 81 MG Tablet Delayed Response Take 162 mg by mouth one time. 2 tabs = 162 mg       No current facility-administered medications for this visit.     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 at SURGERY SAME DAY [facility] ORS   • DILATION AND CURETTAGE  4/3/2013    Performed by Demetrio Webb M.D. at SURGERY SAME DAY [facility] ORS   • LUMPECTOMY     • OTHER      cataracts   • PB RADIATION THERAPY PLAN SIMPLE        Social History     Tobacco Use   • Smoking status: Former Smoker     Packs/day: 0.50     Years: 3.00     Pack years: 1.50     Types: Cigarettes     Quit date: 1959     Years since quittin.6   • Smokeless tobacco: Never Used   Vaping Use   • Vaping Use: Never used   Substance Use Topics   • Alcohol use: Yes     Comment: Occasionally   • Drug use: No     Family History   Problem Relation Age of Onset   • Cancer Mother         breast    • Cancer Sister         breast    • Cancer Sister         breast    • Cancer Sister         breast            ROS    all review of system completed and negative except for those listed above     Objective:     /60 (BP Location: Right arm, Patient Position: Sitting, BP Cuff Size: Adult)   Pulse 74   Temp 36.1 °C (97 °F) (Temporal)   Resp 16   Ht 1.727 m (5' 8\")   Wt 85 kg (187 lb 6.3 oz)   SpO2 94%  Body mass index is 28.49 kg/m².  Physical Exam:        GEN: comfortable, alert and oriented, well nourished, well developed, in no apparent distress   HEENT: NCAT, eyes: pupils equal and reactive, sclera white, EOMIT, good dentition  HEART: limbs warm and well perfused, regular rate, no JVD, no lower extremity edema  LUNGS: speaking in full sentences, not in apparent respiratory distress, no audible wheezes  MSK: normal tone and bulk, no swelling of the joints, gait steady and normal   Reviewed ER note   Reviewed labs   Diabetes discussed in detail     Assessment and Plan:   The following treatment plan was discussed        Problem List Items Addressed This Visit     History of breast cancer (Chronic)     mammo ordered            Diabetes mellitus (HCC)    "  Lab Results   Component Value Date/Time    HBA1C 12.3 (H) 08/04/2021 12:20 AM      New dx in ER   Started on insulin   Will get endo involved as a1c>9   Follow up here as well for shi garcia rn md visit              Relevant Orders    REFERRAL TO ENDOCRINOLOGY    Stress due to illness of family member     Son moved in with her he is struggling with ETOH currently   Her daughter on chemotherapy x 6 years for metastatic breast cancer              Abnormal CBC     Elevated h/h  I suggest we repeat this as well as initate hypoxia work up pfts cxr and sleep study              Relevant Orders    REFERRAL TO PULMONARY AND SLEEP MEDICINE    DX-CHEST-2 VIEWS    PULMONARY FUNCTION TESTS -Test requested: Complete Pulmonary Function Test; Include MIPS/MEPS? Yes      Other Visit Diagnoses     Menopausal and postmenopausal disorder        Relevant Orders    DS-BONE DENSITY STUDY (DEXA)    Encounter for screening mammogram for breast cancer        Relevant Orders    MA-SCREENING MAMMO BILAT W/CAD    Need for vaccination        Relevant Orders    Shingrix Vaccine (Completed)    Other fatigue        Relevant Orders    REFERRAL TO PULMONARY AND SLEEP MEDICINE    DX-CHEST-2 VIEWS    PULMONARY FUNCTION TESTS -Test requested: Complete Pulmonary Function Test; Include MIPS/MEPS? Yes    Elevated hemoglobin (HCC)        Relevant Orders    REFERRAL TO PULMONARY AND SLEEP MEDICINE    DX-CHEST-2 VIEWS    PULMONARY FUNCTION TESTS -Test requested: Complete Pulmonary Function Test; Include MIPS/MEPS? Yes                Instructed to follow up if symptoms worsen or fail to improve, ER/UC precautions discussed as well    Heidi Medrano MD  [facility], Family Medicine   [address] [place] [address] [address]   [place] [address]  Phone: [phone]

## 2021-08-26 NOTE — ASSESSMENT & PLAN NOTE
Lab Results   Component Value Date/Time    HBA1C 12.3 (H) 08/04/2021 12:20 AM      New dx in ER   Started on insulin   Will get endo involved as a1c>9   Follow up here as well for shi garcia rn md visit

## 2021-09-16 ENCOUNTER — PHARMACY VISIT (OUTPATIENT)
Dept: PHARMACY | Facility: MEDICAL CENTER | Age: 81
End: 2021-09-16
Payer: COMMERCIAL

## 2021-10-18 ENCOUNTER — HOSPITAL ENCOUNTER (OUTPATIENT)
Dept: RADIOLOGY | Facility: MEDICAL CENTER | Age: 81
End: 2021-10-18
Attending: FAMILY MEDICINE
Payer: MEDICARE

## 2021-10-18 DIAGNOSIS — Z12.31 ENCOUNTER FOR SCREENING MAMMOGRAM FOR BREAST CANCER: ICD-10-CM

## 2021-10-18 DIAGNOSIS — N95.9 MENOPAUSAL AND POSTMENOPAUSAL DISORDER: ICD-10-CM

## 2021-10-18 PROCEDURE — 77080 DXA BONE DENSITY AXIAL: CPT

## 2021-10-18 PROCEDURE — 77063 BREAST TOMOSYNTHESIS BI: CPT

## 2021-10-28 ENCOUNTER — OFFICE VISIT (OUTPATIENT)
Dept: MEDICAL GROUP | Facility: MEDICAL CENTER | Age: 81
End: 2021-10-28
Payer: MEDICARE

## 2021-10-28 VITALS
DIASTOLIC BLOOD PRESSURE: 76 MMHG | WEIGHT: 183.6 LBS | BODY MASS INDEX: 27.83 KG/M2 | HEIGHT: 68 IN | SYSTOLIC BLOOD PRESSURE: 122 MMHG

## 2021-10-28 DIAGNOSIS — Z23 NEED FOR VACCINATION: ICD-10-CM

## 2021-10-28 DIAGNOSIS — Z79.4 TYPE 2 DIABETES MELLITUS WITH OTHER SPECIFIED COMPLICATION, WITH LONG-TERM CURRENT USE OF INSULIN (HCC): ICD-10-CM

## 2021-10-28 DIAGNOSIS — E11.69 TYPE 2 DIABETES MELLITUS WITH OTHER SPECIFIED COMPLICATION, WITH LONG-TERM CURRENT USE OF INSULIN (HCC): ICD-10-CM

## 2021-10-28 DIAGNOSIS — R79.89 ABNORMAL CBC: ICD-10-CM

## 2021-10-28 PROCEDURE — 99214 OFFICE O/P EST MOD 30 MIN: CPT | Performed by: FAMILY MEDICINE

## 2021-10-28 PROCEDURE — G0008 ADMIN INFLUENZA VIRUS VAC: HCPCS | Performed by: FAMILY MEDICINE

## 2021-10-28 PROCEDURE — 90750 HZV VACC RECOMBINANT IM: CPT | Performed by: FAMILY MEDICINE

## 2021-10-28 PROCEDURE — 90662 IIV NO PRSV INCREASED AG IM: CPT | Performed by: FAMILY MEDICINE

## 2021-10-28 PROCEDURE — 90472 IMMUNIZATION ADMIN EACH ADD: CPT | Performed by: FAMILY MEDICINE

## 2021-10-28 RX ORDER — METFORMIN HYDROCHLORIDE 500 MG/1
500 TABLET, EXTENDED RELEASE ORAL 2 TIMES DAILY
Qty: 180 TABLET | Refills: 1 | Status: SHIPPED | OUTPATIENT
Start: 2021-10-28 | End: 2022-04-26 | Stop reason: SDUPTHER

## 2021-10-28 ASSESSMENT — FIBROSIS 4 INDEX: FIB4 SCORE: 0.99

## 2021-10-28 NOTE — PROGRESS NOTES
This medical record contains text that has been entered with the assistance of computer voice recognition and dictation software.  Therefore, it may contain unintended errors in text, spelling, punctuation, or grammar        Chief Complaint   Patient presents with   • Diabetes Mellitus       Ailyn Saavedra is a 81 y.o. female here evaluation and management of:     ER follow up diagnosed with dm     Has been > 1year since we have seen each other nearly 2 years       Current Outpatient Medications   Medication Sig Dispense Refill   • metFORMIN ER (GLUCOPHAGE XR) 500 MG TABLET SR 24 HR Take 1 Tablet by mouth 2 times a day. 180 Tablet 1   • glucose blood (CONTOUR NEXT TEST) strip Testing blood sugars one time per day.  E11.65 100 Strip 1   • insulin aspart (NOVOLOG) 100 UNIT/ML injection PEN Inject 0-6 units 3 times daily before meals per sliding scale.For glucose: 70-150mg/dL =0 Units;151 - 200 mg/dL=1 Unit;201 - 250 mg/dL =2 Units;251 - 300mg/dL=3 Units;301 - 350mg/dL = 4 Units;351 - 400 mg/dL =5 Units;Over 400 mg/dL = 6 Units; Over 400 mg/dL x 2 consecutive FSBG = 6 Units and call MD (Patient taking differently: 2 Units. Inject 0-6 units 3 times daily before meals per sliding scale.For glucose: 70-150mg/dL =0 Units;151 - 200 mg/dL=1 Unit;201 - 250 mg/dL =2 Units;251 - 300mg/dL=3 Units;301 - 350mg/dL = 4 Units;351 - 400 mg/dL =5 Units;Over 400 mg/dL = 6 Units; Over 400 mg/dL x 2 consecutive FSBG = 6 Units and call MD) 6 mL 2   • aspirin EC (ECOTRIN) 81 MG Tablet Delayed Response Take 162 mg by mouth one time. 2 tabs = 162 mg     • Lancets Use one lancet to Test blood sugar three times daily before meals. 100 Each 0   • Alcohol Swabs Wipe site with prep pad prior to injection. 100 Each 0   • Insulin Pen Needle 32 G x 4 mm Use one pen needle in pen device to inject insulin three times daily. 100 Each 0     No current facility-administered medications for this visit.     Patient Active Problem List    Diagnosis Date  "Noted   • Diabetes mellitus (HCC) 2021   • Stress due to illness of family member 2021   • Abnormal CBC 2021   • Pain in the chest 2021   • Hyperglycemia 2021   • History of breast cancer 2019   • Broken heart syndrome 2015   • Cancer of skin of eyelid 2014     Past Surgical History:   Procedure Laterality Date   • HYSTEROSCOPY WITH VIDEO OPERATIVE  4/3/2013    Performed by [facility] M.D[facility]   • DILATION AND CURETTAGE  4/3/2013    Performed by Demetrio Webb M.D. at [facility]   • LUMPECTOMY     • OTHER      cataracts   • PB RADIATION THERAPY PLAN SIMPLE        Social History     Tobacco Use   • Smoking status: Former Smoker     Packs/day: 0.50     Years: 3.00     Pack years: 1.50     Types: Cigarettes     Quit date: 1959     Years since quittin.8   • Smokeless tobacco: Never Used   Vaping Use   • Vaping Use: Never used   Substance Use Topics   • Alcohol use: Yes     Comment: Occasionally   • Drug use: No     Family History   Problem Relation Age of Onset   • Cancer Mother         breast    • Cancer Sister         breast    • Cancer Sister         breast    • Cancer Sister         breast            ROS    all review of system completed and negative except for those listed above     Objective:     /76 (BP Location: Left arm, Patient Position: Sitting, BP Cuff Size: Adult)   Ht 1.727 m (5' 8\")   Wt 83.3 kg (183 lb 9.6 oz)  Body mass index is 27.92 kg/m².  Physical Exam:        GEN: comfortable, alert and oriented, well nourished, well developed, in no apparent distress   HEENT: NCAT, eyes: pupils equal and reactive, sclera white, EOMIT, good dentition  HEART: limbs warm and well perfused, regular rate, no JVD, no lower extremity edema  LUNGS: speaking in full sentences, not in apparent respiratory distress, no audible wheezes  MSK: normal tone and bulk, no swelling of the joints, gait steady " and normal         Assessment and Plan:   The following treatment plan was discussed  Problem List Items Addressed This Visit     Diabetes mellitus (HCC)     Lab Results   Component Value Date/Time    HBA1C 12.3 (H) 08/04/2021 12:20 AM      We will change to metformin   See dm rn note for more details   I would like her to see endo too , referral is still open   Follow up 1 mo so we can recheck a1c  30+ min spent              Relevant Medications    metFORMIN ER (GLUCOPHAGE XR) 500 MG TABLET SR 24 HR                Instructed to follow up if symptoms worsen or fail to improve, ER/UC precautions discussed as well    Heidi Medrano MD  [facility], Family Medicine   [address] [place] [address] [address]   [place] [address]  Phone: [phone]

## 2021-10-28 NOTE — PROGRESS NOTES
RN-CDE Note    Subjective:     HPI:  Ailyn Saavedra is a 81 y.o. old patient who is seen by the Diabetes Nurse Secialist today for review of her type 2 diabetes.  She was recently diagnosed with diabetes while in the hospital in August. She was discharged home on short acting insulin three times a day ac meals.  She says she sometimes forgets to give at meals and then will give later.   Diabetes Medications:   Novolog sliding scale 1:50 over 150   Taking  ASA: Yes, but not consistent with taking.      Exercise:  Plays tennis, tries for twice a week.   Diet: eating 3 meals per day.  Tries to eat healthy most of the time.   Patient's body mass index is 27.92 kg/m². Exercise and nutrition counseling were performed at this visit.      Health Maintenance:   Health Maintenance Due   Topic Date Due   • URINE ACR / MICROALBUMIN  Never done   • DIABETES MONOFILAMENT / LE EXAM  Never done   • RETINAL SCREENING  Never done   • IMM DTaP/Tdap/Td Vaccine (1 - Tdap) Never done   • IMM INFLUENZA (1) 09/01/2021   • IMM ZOSTER VACCINES (2 of 2) 10/21/2021         DM:   Last A1c:   Lab Results   Component Value Date/Time    HBA1C 12.3 (H) 08/04/2021 12:20 AM      A1C GOAL: < 7.5    Glucose monitoring frequency: testing bid      Last Retinal Exam: new diagnosis, has until August to completer  Daily Foot Exam: No     No results found for: MICROALBCALC, MALBCRT, MALBEXCR, YBRJLB11, MICROALBUR, MICRALB, UMICROALBUM, MICROALBTIM     ACR Albumin/Creatinine Ratio goal <30     HTN:   Blood pressure goal <140/<80 at goal.   Currently Rx ACE/ARB: No     Dyslipidemia:    Lab Results   Component Value Date/Time    CHOLSTRLTOT 181 08/04/2021 12:20 AM     (H) 08/04/2021 12:20 AM    HDL 49 08/04/2021 12:20 AM    TRIGLYCERIDE 87 08/04/2021 12:20 AM         Currently Rx Statin: No     She  reports that she quit smoking about 62 years ago. Her smoking use included cigarettes. She has a 1.50 pack-year smoking history. She has never used  smokeless tobacco.      Plan:     Discussed and educated on:   - All medications, side effects and compliance (discussed carefully)  - Annual eye examinations at Ophthalmology  - HbA1C: target  - Home glucose monitoring emphasized  - Weight control and daily exercise    Recommended medication changes: suggest discontinuing insulin and start Metformin  mg bid.

## 2021-10-28 NOTE — ASSESSMENT & PLAN NOTE
Lab Results   Component Value Date/Time    HBA1C 12.3 (H) 08/04/2021 12:20 AM      We will change to metformin   See dm rn note for more details   I would like her to see endo too , referral is still open   Follow up 1 mo so we can recheck a1c  30+ min spent

## 2021-12-07 ENCOUNTER — TELEPHONE (OUTPATIENT)
Dept: MEDICAL GROUP | Facility: MEDICAL CENTER | Age: 81
End: 2021-12-07

## 2021-12-08 ENCOUNTER — OFFICE VISIT (OUTPATIENT)
Dept: MEDICAL GROUP | Facility: MEDICAL CENTER | Age: 81
End: 2021-12-08
Payer: MEDICARE

## 2021-12-08 VITALS
SYSTOLIC BLOOD PRESSURE: 114 MMHG | TEMPERATURE: 97.5 F | RESPIRATION RATE: 16 BRPM | OXYGEN SATURATION: 95 % | BODY MASS INDEX: 25.91 KG/M2 | HEIGHT: 68 IN | HEART RATE: 60 BPM | DIASTOLIC BLOOD PRESSURE: 68 MMHG | WEIGHT: 171 LBS

## 2021-12-08 DIAGNOSIS — R19.7 DIARRHEA, UNSPECIFIED TYPE: ICD-10-CM

## 2021-12-08 DIAGNOSIS — Z79.4 TYPE 2 DIABETES MELLITUS WITH OTHER SPECIFIED COMPLICATION, WITH LONG-TERM CURRENT USE OF INSULIN (HCC): ICD-10-CM

## 2021-12-08 DIAGNOSIS — E11.69 TYPE 2 DIABETES MELLITUS WITH OTHER SPECIFIED COMPLICATION, WITH LONG-TERM CURRENT USE OF INSULIN (HCC): ICD-10-CM

## 2021-12-08 PROCEDURE — 99214 OFFICE O/P EST MOD 30 MIN: CPT | Performed by: FAMILY MEDICINE

## 2021-12-08 ASSESSMENT — FIBROSIS 4 INDEX: FIB4 SCORE: 0.99

## 2021-12-08 NOTE — PROGRESS NOTES
This medical record contains text that has been entered with the assistance of computer voice recognition and dictation software.  Therefore, it may contain unintended errors in text, spelling, punctuation, or grammar        Chief Complaint   Patient presents with   • Diarrhea     pt has been experiencing diarrhea 2 nights ago. Has not experienced anything today   • Medication Problem     pt has been taking her metformin as perscribed. not experiencing any symptoms currentyl       Ailyn Saavedra is a 81 y.o. female here evaluation and management of:     Called us for acute diarrhea   No fever   And we were able to get her in next day         Current Outpatient Medications   Medication Sig Dispense Refill   • metFORMIN ER (GLUCOPHAGE XR) 500 MG TABLET SR 24 HR Take 1 Tablet by mouth 2 times a day. 180 Tablet 1   • glucose blood (CONTOUR NEXT TEST) strip Testing blood sugars one time per day.  E11.65 100 Strip 1   • Lancets Use one lancet to Test blood sugar three times daily before meals. 100 Each 0   • Alcohol Swabs Wipe site with prep pad prior to injection. 100 Each 0   • Insulin Pen Needle 32 G x 4 mm Use one pen needle in pen device to inject insulin three times daily. 100 Each 0   • insulin aspart (NOVOLOG) 100 UNIT/ML injection PEN Inject 0-6 units 3 times daily before meals per sliding scale.For glucose: 70-150mg/dL =0 Units;151 - 200 mg/dL=1 Unit;201 - 250 mg/dL =2 Units;251 - 300mg/dL=3 Units;301 - 350mg/dL = 4 Units;351 - 400 mg/dL =5 Units;Over 400 mg/dL = 6 Units; Over 400 mg/dL x 2 consecutive FSBG = 6 Units and call MD (Patient taking differently: 2 Units. Inject 0-6 units 3 times daily before meals per sliding scale.For glucose: 70-150mg/dL =0 Units;151 - 200 mg/dL=1 Unit;201 - 250 mg/dL =2 Units;251 - 300mg/dL=3 Units;301 - 350mg/dL = 4 Units;351 - 400 mg/dL =5 Units;Over 400 mg/dL = 6 Units; Over 400 mg/dL x 2 consecutive FSBG = 6 Units and call MD) 6 mL 2   • aspirin EC (ECOTRIN) 81 MG Tablet  "Delayed Response Take 162 mg by mouth one time. 2 tabs = 162 mg       No current facility-administered medications for this visit.     Patient Active Problem List    Diagnosis Date Noted   • Diarrhea 2021   • Diabetes mellitus (HCC) 2021   • Stress due to illness of family member 2021   • Abnormal CBC 2021   • Pain in the chest 2021   • Hyperglycemia 2021   • History of breast cancer 2019   • Broken heart syndrome 2015   • Cancer of skin of eyelid 2014     Past Surgical History:   Procedure Laterality Date   • HYSTEROSCOPY WITH VIDEO OPERATIVE  4/3/2013    Performed by Demetrio Webb M.D. at [facility]   • DILATION AND CURETTAGE  4/3/2013    Performed by Demetrio Webb M.D. at [facility]   • LUMPECTOMY     • OTHER      cataracts   • PB RADIATION THERAPY PLAN SIMPLE        Social History     Tobacco Use   • Smoking status: Former Smoker     Packs/day: 0.50     Years: 3.00     Pack years: 1.50     Types: Cigarettes     Quit date: 1959     Years since quittin.9   • Smokeless tobacco: Never Used   Vaping Use   • Vaping Use: Never used   Substance Use Topics   • Alcohol use: Yes     Comment: Occasionally   • Drug use: No     Family History   Problem Relation Age of Onset   • Cancer Mother         breast    • Cancer Sister         breast    • Cancer Sister         breast    • Cancer Sister         breast            ROS    all review of system completed and negative except for those listed above     Objective:     /68 (BP Location: Right arm, Patient Position: Sitting, BP Cuff Size: Adult)   Pulse 60   Temp 36.4 °C (97.5 °F) (Temporal)   Resp 16   Ht 1.727 m (5' 8\")   Wt 77.6 kg (171 lb)   SpO2 95%  Body mass index is 26 kg/m².  Physical Exam:        GEN: comfortable, alert and oriented, well nourished, well developed, in no apparent distress   HEENT: NCAT, eyes: pupils equal and reactive, sclera white, " EOMIT, good dentition  HEART: limbs warm and well perfused, regular rate, no JVD, no lower extremity edema  LUNGS: speaking in full sentences, not in apparent respiratory distress, no audible wheezes  MSK: normal tone and bulk, no swelling of the joints, gait steady and normal         Assessment and Plan:   The following treatment plan was discussed        Problem List Items Addressed This Visit     Diabetes mellitus (HCC)     Lab Results   Component Value Date/Time    HBA1C 12.3 (H) 08/04/2021 12:20 AM      Reminder to follow up to our duel dm rn appt   Reminder to schedule consult with endo   She can do labs prior or do A1c in clinic   States am fasting sugar has been 140's -150s with diet modification, metformin and minimal insulin sliding scale 2 units tid alone                  Relevant Orders    Basic Metabolic Panel    Lipid Profile    HEMOGLOBIN A1C    MICROALBUMIN CREAT RATIO URINE    Diarrhea     States 2 nights ago she awoke with diarrhea that lasted until the am   Yesterday she felt much better and even better today   She has been eating a bland diet and avoiding acidity   Could be preform toxin, viral gastro, or other however she is improving with supportive care measures alone  Counseled to stick to bland diet for now, avoid dairy and advance diet as tolerated     20+ min spent                          Instructed to follow up if symptoms worsen or fail to improve, ER/UC precautions discussed as well    Heidi Medrano MD  [facility], Family Medicine   [address] [place] [address]   [place] [address]  Phone: [phone]

## 2021-12-08 NOTE — ASSESSMENT & PLAN NOTE
Lab Results   Component Value Date/Time    HBA1C 12.3 (H) 08/04/2021 12:20 AM      Reminder to follow up to our duel dm rn appt   Reminder to schedule consult with endo   She can do labs prior or do A1c in clinic   States am fasting sugar has been 140's -150s with diet modification, metformin and minimal insulin sliding scale 2 units tid alone

## 2021-12-08 NOTE — ASSESSMENT & PLAN NOTE
States 2 nights ago she awoke with diarrhea that lasted until the am   Yesterday she felt much better and even better today   She has been eating a bland diet and avoiding acidity   Could be preform toxin, viral gastro, or other however she is improving with supportive care measures alone  Counseled to stick to bland diet for now, avoid dairy and advance diet as tolerated     20+ min spent

## 2021-12-08 NOTE — TELEPHONE ENCOUNTER
Received call from pt stating she has been having diarrhea since last night. Pt thinks this is related to the metformin she has been taking. Just started new bottle of metformin but has been on it for a while with no reaction previously.   Pt has been checking her glucose readings: this morning 138, 148 this afternoon. She has only eaten few slices of bread today.   She would like to know if she should continue to take the metformin as prescribe until she can see us tomorrow?    Denies any other symptoms    Scheduled for appt tomorrow 12/8 at 11 am

## 2022-02-04 ENCOUNTER — TELEPHONE (OUTPATIENT)
Dept: MEDICAL GROUP | Facility: MEDICAL CENTER | Age: 82
End: 2022-02-04

## 2022-02-04 DIAGNOSIS — Z79.4 TYPE 2 DIABETES MELLITUS WITH OTHER SPECIFIED COMPLICATION, WITH LONG-TERM CURRENT USE OF INSULIN (HCC): ICD-10-CM

## 2022-02-04 DIAGNOSIS — E11.69 TYPE 2 DIABETES MELLITUS WITH OTHER SPECIFIED COMPLICATION, WITH LONG-TERM CURRENT USE OF INSULIN (HCC): ICD-10-CM

## 2022-02-04 NOTE — TELEPHONE ENCOUNTER
1. Caller Name: Ailyn Rolleniteshbc                          Call Back Number: [phone] (home)         How would the patient prefer to be contacted with a response: Phone call OK to leave a detailed message    Received call from pt stating she tried to make appt with endo - apparently needs newer referral. Have pended for approval.

## 2022-02-15 ENCOUNTER — PATIENT MESSAGE (OUTPATIENT)
Dept: HEALTH INFORMATION MANAGEMENT | Facility: OTHER | Age: 82
End: 2022-02-15
Payer: MEDICARE

## 2022-03-22 NOTE — PROGRESS NOTES
Subjective:     CC: DM concerns    PCP: Dr. Heidi Medrano in our office    HPI:   Ailyn presents today with     This is a chronic condition.  Current medications: metformin 500mg BID  Last A1c: 12.3%  Last Microalb/Cr ratio: more than 1 year ago  Fasting sugars: 179-200  ACEi/ARB: no  Statin: no  Aspirin: yes occasionally  Concomitant HTN: no  No chest pain, palpitations, SOB.  She is more thirsty than normal.  She is overall eating a low sugar diet overall but not consistently.    States she had a tooth fall out recently.  She went to the dentist immediately and was told the tooth would not stay in when replaced.  She is putting the tooth in now if it falls out.  Her dentist referred her for oral sugery and has appt on May 5.    She takes her tooth out when at home.    Leg cramping- onset: 1 week ago.  Started after playing tennis.  Starts on her foot and goes up her leg.  She gest out of bed and stands up and symptoms improve.  She plays tennis for 2 hours twice a week.  She is trying to stay well hydrated.  Last episode was 3 days ago.      No problem-specific Assessment & Plan notes found for this encounter.      Past Medical History:   Diagnosis Date   • Breast cancer (HCC)    • Breath shortness     with exercise   • Cancer (HCC)     eye lid and breast    • CATARACT    • Other specified symptom associated with female genital organs        Social History     Tobacco Use   • Smoking status: Former Smoker     Packs/day: 0.50     Years: 3.00     Pack years: 1.50     Types: Cigarettes     Quit date: 1959     Years since quittin.2   • Smokeless tobacco: Never Used   Vaping Use   • Vaping Use: Never used   Substance Use Topics   • Alcohol use: Yes     Comment: Occasionally   • Drug use: No       Current Outpatient Medications Ordered in Epic   Medication Sig Dispense Refill   • metFORMIN ER (GLUCOPHAGE XR) 500 MG TABLET SR 24 HR Take 1 Tablet by mouth 2 times a day. 180 Tablet 1   • glucose blood (CONTOUR  "NEXT TEST) strip Testing blood sugars one time per day.  E11.65 100 Strip 1   • Lancets Use one lancet to Test blood sugar three times daily before meals. (Patient taking differently: Use one lancet to Test blood sugar three times daily before meals.) 100 Each 0   • aspirin EC (ECOTRIN) 81 MG Tablet Delayed Response Take 162 mg by mouth one time. 2 tabs = 162 mg     • Alcohol Swabs Wipe site with prep pad prior to injection. (Patient not taking: Reported on 3/24/2022) 100 Each 0     No current [facility]-ordered facility-administered medications on file.       Allergies:  Patient has no known allergies.    Health Maintenance: Diabetes labs ordered    ROS:  Gen: no fevers/chills  Pulm: no sob, no cough  CV: no chest pain, no palpitations  GI: no nausea/vomiting, no diarrhea.  Patient had recent constipation at which time she did not feel well, but this has now resolved  : no dysuria  MSk: Per HPI  Skin: Patient has several lesions on her leg that were recently removed with cryotherapy.  She states that they lesions are healing well  Neuro: no headaches, no numbness/tingling      Objective:       Exam:  /84 (BP Location: Right arm, Patient Position: Sitting, BP Cuff Size: Adult)   Pulse 69   Temp 36.7 °C (98 °F) (Temporal)   Resp 14   Ht 1.727 m (5' 8\")   Wt 75.8 kg (167 lb)   SpO2 97%   BMI 25.39 kg/m²  Body mass index is 25.39 kg/m².    Gen: Alert and oriented, No apparent distress.  Neck: Neck is supple without lymphadenopathy.  Lungs: Normal effort, CTA bilaterally, no wheezes, rhonchi, or rales  CV: Regular rate and rhythm. No murmurs, rubs, or gallops.  Ext: No clubbing, cyanosis, edema.    Labs: POCT sugar 177  POCT A1c 7.7%  POCT UA with trace leukocytes, otherwise negative.    Assessment & Plan:     81 y.o. female with the following -     1. Type 2 diabetes mellitus with other specified complication, with long-term current use of insulin (HCC)  Chronic issue, improved A1c this visit with A1c of 7.7%. "  Continue metformin BID.  Even though her home fasting blood sugars are elevated her average sugars are normal.  Will check labs and encourage low glycemic diet and hydration.  Recommended she check her blood sugars twice a day and bring that blood sugar log with her to her next appointment.  I will have patient follow-up with her PCP within 1 month to review results and further treat based on those results.  - POCT Urinalysis  - POCT glucose  - POCT Hemoglobin A1C  - CBC WITH DIFFERENTIAL; Future  - Comp Metabolic Panel; Future  - Lipid Profile; Future  - MICROALBUMIN CREAT RATIO URINE; Future    2. Elevated hemoglobin (HCC)  New issue noted on her most recent CBC.  Was likely due to dehydration, so we will recheck CBC and have patient follow-up with PCP for results    3. Muscle cramping  Newly reported issue.  The cramping is in her legs and intermittent and occurs after tennis.  Likely due to dehydration versus underlying electrolyte deficiency.  Will check labs, encouraged hydration, streching.  We will further treat based on results.  - MAGNESIUM; Future      Return in about 4 weeks (around 4/21/2022) for lab review with PCP.    Please note that this dictation was created using voice recognition software. I have made every reasonable attempt to correct obvious errors, but I expect that there are errors of grammar and possibly content that I did not discover before finalizing the note.

## 2022-03-24 ENCOUNTER — OFFICE VISIT (OUTPATIENT)
Dept: MEDICAL GROUP | Facility: MEDICAL CENTER | Age: 82
End: 2022-03-24
Payer: MEDICARE

## 2022-03-24 VITALS
WEIGHT: 167 LBS | DIASTOLIC BLOOD PRESSURE: 84 MMHG | HEIGHT: 68 IN | OXYGEN SATURATION: 97 % | RESPIRATION RATE: 14 BRPM | TEMPERATURE: 98 F | BODY MASS INDEX: 25.31 KG/M2 | HEART RATE: 69 BPM | SYSTOLIC BLOOD PRESSURE: 122 MMHG

## 2022-03-24 DIAGNOSIS — D58.2 ELEVATED HEMOGLOBIN (HCC): ICD-10-CM

## 2022-03-24 DIAGNOSIS — E11.69 TYPE 2 DIABETES MELLITUS WITH OTHER SPECIFIED COMPLICATION, WITH LONG-TERM CURRENT USE OF INSULIN (HCC): ICD-10-CM

## 2022-03-24 DIAGNOSIS — Z79.4 TYPE 2 DIABETES MELLITUS WITH OTHER SPECIFIED COMPLICATION, WITH LONG-TERM CURRENT USE OF INSULIN (HCC): ICD-10-CM

## 2022-03-24 DIAGNOSIS — R25.2 MUSCLE CRAMPING: ICD-10-CM

## 2022-03-24 LAB
APPEARANCE UR: CLEAR
BILIRUB UR STRIP-MCNC: NEGATIVE MG/DL
COLOR UR AUTO: YELLOW
GLUCOSE BLD-MCNC: 177 MG/DL (ref 70–100)
GLUCOSE UR STRIP.AUTO-MCNC: NEGATIVE MG/DL
HBA1C MFR BLD: 7.7 % (ref 0–5.6)
INT CON NEG: ABNORMAL
INT CON POS: ABNORMAL
KETONES UR STRIP.AUTO-MCNC: NEGATIVE MG/DL
LEUKOCYTE ESTERASE UR QL STRIP.AUTO: NORMAL
NITRITE UR QL STRIP.AUTO: NEGATIVE
PH UR STRIP.AUTO: 5.5 [PH] (ref 5–8)
PROT UR QL STRIP: NEGATIVE MG/DL
RBC UR QL AUTO: NEGATIVE
SP GR UR STRIP.AUTO: 1.02
UROBILINOGEN UR STRIP-MCNC: 0.2 MG/DL

## 2022-03-24 PROCEDURE — 99214 OFFICE O/P EST MOD 30 MIN: CPT | Performed by: FAMILY MEDICINE

## 2022-03-24 PROCEDURE — 81002 URINALYSIS NONAUTO W/O SCOPE: CPT | Performed by: FAMILY MEDICINE

## 2022-03-24 PROCEDURE — 83036 HEMOGLOBIN GLYCOSYLATED A1C: CPT | Performed by: FAMILY MEDICINE

## 2022-03-24 PROCEDURE — 82962 GLUCOSE BLOOD TEST: CPT | Performed by: FAMILY MEDICINE

## 2022-03-24 ASSESSMENT — FIBROSIS 4 INDEX: FIB4 SCORE: 0.99

## 2022-03-30 ENCOUNTER — HOSPITAL ENCOUNTER (OUTPATIENT)
Dept: LAB | Facility: MEDICAL CENTER | Age: 82
End: 2022-03-30
Attending: FAMILY MEDICINE
Payer: MEDICARE

## 2022-03-30 DIAGNOSIS — E11.69 TYPE 2 DIABETES MELLITUS WITH OTHER SPECIFIED COMPLICATION, WITH LONG-TERM CURRENT USE OF INSULIN (HCC): ICD-10-CM

## 2022-03-30 DIAGNOSIS — Z79.4 TYPE 2 DIABETES MELLITUS WITH OTHER SPECIFIED COMPLICATION, WITH LONG-TERM CURRENT USE OF INSULIN (HCC): ICD-10-CM

## 2022-03-30 DIAGNOSIS — R25.2 MUSCLE CRAMPING: ICD-10-CM

## 2022-03-30 LAB
ALBUMIN SERPL BCP-MCNC: 4.1 G/DL (ref 3.2–4.9)
ALBUMIN/GLOB SERPL: 1.9 G/DL
ALP SERPL-CCNC: 54 U/L (ref 30–99)
ALT SERPL-CCNC: 16 U/L (ref 2–50)
ANION GAP SERPL CALC-SCNC: 10 MMOL/L (ref 7–16)
AST SERPL-CCNC: 17 U/L (ref 12–45)
BASOPHILS # BLD AUTO: 0.8 % (ref 0–1.8)
BASOPHILS # BLD: 0.05 K/UL (ref 0–0.12)
BILIRUB SERPL-MCNC: 0.6 MG/DL (ref 0.1–1.5)
BUN SERPL-MCNC: 13 MG/DL (ref 8–22)
CALCIUM SERPL-MCNC: 9.9 MG/DL (ref 8.5–10.5)
CHLORIDE SERPL-SCNC: 99 MMOL/L (ref 96–112)
CHOLEST SERPL-MCNC: 207 MG/DL (ref 100–199)
CO2 SERPL-SCNC: 25 MMOL/L (ref 20–33)
CREAT SERPL-MCNC: 0.96 MG/DL (ref 0.5–1.4)
CREAT UR-MCNC: 161.83 MG/DL
EOSINOPHIL # BLD AUTO: 0.2 K/UL (ref 0–0.51)
EOSINOPHIL NFR BLD: 3.2 % (ref 0–6.9)
ERYTHROCYTE [DISTWIDTH] IN BLOOD BY AUTOMATED COUNT: 43.6 FL (ref 35.9–50)
FASTING STATUS PATIENT QL REPORTED: NORMAL
GFR SERPLBLD CREATININE-BSD FMLA CKD-EPI: 59 ML/MIN/1.73 M 2
GLOBULIN SER CALC-MCNC: 2.2 G/DL (ref 1.9–3.5)
GLUCOSE SERPL-MCNC: 213 MG/DL (ref 65–99)
HCT VFR BLD AUTO: 44.4 % (ref 37–47)
HDLC SERPL-MCNC: 72 MG/DL
HGB BLD-MCNC: 14.9 G/DL (ref 12–16)
IMM GRANULOCYTES # BLD AUTO: 0.02 K/UL (ref 0–0.11)
IMM GRANULOCYTES NFR BLD AUTO: 0.3 % (ref 0–0.9)
LDLC SERPL CALC-MCNC: 116 MG/DL
LYMPHOCYTES # BLD AUTO: 1.59 K/UL (ref 1–4.8)
LYMPHOCYTES NFR BLD: 25.1 % (ref 22–41)
MAGNESIUM SERPL-MCNC: 1.9 MG/DL (ref 1.5–2.5)
MCH RBC QN AUTO: 31.2 PG (ref 27–33)
MCHC RBC AUTO-ENTMCNC: 33.6 G/DL (ref 33.6–35)
MCV RBC AUTO: 93.1 FL (ref 81.4–97.8)
MICROALBUMIN UR-MCNC: <1.2 MG/DL
MICROALBUMIN/CREAT UR: NORMAL MG/G (ref 0–30)
MONOCYTES # BLD AUTO: 0.6 K/UL (ref 0–0.85)
MONOCYTES NFR BLD AUTO: 9.5 % (ref 0–13.4)
NEUTROPHILS # BLD AUTO: 3.87 K/UL (ref 2–7.15)
NEUTROPHILS NFR BLD: 61.1 % (ref 44–72)
NRBC # BLD AUTO: 0 K/UL
NRBC BLD-RTO: 0 /100 WBC
PLATELET # BLD AUTO: 303 K/UL (ref 164–446)
PMV BLD AUTO: 9.9 FL (ref 9–12.9)
POTASSIUM SERPL-SCNC: 4.8 MMOL/L (ref 3.6–5.5)
PROT SERPL-MCNC: 6.3 G/DL (ref 6–8.2)
RBC # BLD AUTO: 4.77 M/UL (ref 4.2–5.4)
SODIUM SERPL-SCNC: 134 MMOL/L (ref 135–145)
TRIGL SERPL-MCNC: 95 MG/DL (ref 0–149)
WBC # BLD AUTO: 6.3 K/UL (ref 4.8–10.8)

## 2022-03-30 PROCEDURE — 36415 COLL VENOUS BLD VENIPUNCTURE: CPT

## 2022-03-30 PROCEDURE — 80053 COMPREHEN METABOLIC PANEL: CPT

## 2022-03-30 PROCEDURE — 83735 ASSAY OF MAGNESIUM: CPT

## 2022-03-30 PROCEDURE — 82570 ASSAY OF URINE CREATININE: CPT

## 2022-03-30 PROCEDURE — 85025 COMPLETE CBC W/AUTO DIFF WBC: CPT

## 2022-03-30 PROCEDURE — 80061 LIPID PANEL: CPT

## 2022-03-30 PROCEDURE — 82043 UR ALBUMIN QUANTITATIVE: CPT

## 2022-04-26 ENCOUNTER — OFFICE VISIT (OUTPATIENT)
Dept: MEDICAL GROUP | Facility: MEDICAL CENTER | Age: 82
End: 2022-04-26
Payer: MEDICARE

## 2022-04-26 VITALS
BODY MASS INDEX: 24.25 KG/M2 | OXYGEN SATURATION: 94 % | RESPIRATION RATE: 16 BRPM | HEIGHT: 68 IN | DIASTOLIC BLOOD PRESSURE: 72 MMHG | WEIGHT: 160 LBS | SYSTOLIC BLOOD PRESSURE: 118 MMHG | HEART RATE: 65 BPM | TEMPERATURE: 98 F

## 2022-04-26 DIAGNOSIS — Z79.4 TYPE 2 DIABETES MELLITUS WITH OTHER SPECIFIED COMPLICATION, WITH LONG-TERM CURRENT USE OF INSULIN (HCC): ICD-10-CM

## 2022-04-26 DIAGNOSIS — E11.69 TYPE 2 DIABETES MELLITUS WITH OTHER SPECIFIED COMPLICATION, WITH LONG-TERM CURRENT USE OF INSULIN (HCC): ICD-10-CM

## 2022-04-26 DIAGNOSIS — S69.91XD INJURY OF RIGHT WRIST, SUBSEQUENT ENCOUNTER: ICD-10-CM

## 2022-04-26 DIAGNOSIS — Z71.3 ENCOUNTER FOR WEIGHT LOSS COUNSELING: ICD-10-CM

## 2022-04-26 PROBLEM — S69.91XA INJURY OF RIGHT WRIST: Status: ACTIVE | Noted: 2022-04-26

## 2022-04-26 PROCEDURE — 99214 OFFICE O/P EST MOD 30 MIN: CPT | Performed by: FAMILY MEDICINE

## 2022-04-26 RX ORDER — METFORMIN HYDROCHLORIDE 500 MG/1
TABLET, EXTENDED RELEASE ORAL
Qty: 400 TABLET | Refills: 3 | Status: SHIPPED | OUTPATIENT
Start: 2022-04-26 | End: 2022-05-26

## 2022-04-26 RX ORDER — SEMAGLUTIDE 1.34 MG/ML
INJECTION, SOLUTION SUBCUTANEOUS
Qty: 3 ML | Refills: 3 | Status: SHIPPED | OUTPATIENT
Start: 2022-04-26 | End: 2022-05-26

## 2022-04-26 RX ORDER — METFORMIN HYDROCHLORIDE 500 MG/1
2000 TABLET, EXTENDED RELEASE ORAL DAILY
Qty: 360 TABLET | Refills: 3 | Status: SHIPPED | OUTPATIENT
Start: 2022-04-26 | End: 2022-04-26

## 2022-04-26 ASSESSMENT — FIBROSIS 4 INDEX: FIB4 SCORE: 1.16

## 2022-04-26 ASSESSMENT — PATIENT HEALTH QUESTIONNAIRE - PHQ9: CLINICAL INTERPRETATION OF PHQ2 SCORE: 0

## 2022-04-26 NOTE — ASSESSMENT & PLAN NOTE
Vitals 6/29/2019 9/12/2019 8/3/2021 8/3/2021 8/3/2021 8/5/2021   WEIGHT 205 202 189  193.56      Vitals 8/26/2021 10/28/2021 12/8/2021 3/24/2022 4/26/2022   WEIGHT 187.39 183.6 171 167 160   Intentional   Avoiding sugar dessert   Incredible wt loss past 3 years   Metformin and glp-1 to help with maintenance if desired

## 2022-04-26 NOTE — TELEPHONE ENCOUNTER
Was the patient seen in the last year in this department? Yes   Does patient have an active prescription for medications requested? No   Received Request Via: Pharmacy  Pharmacy comment: Please clarify the directions  for this prescription.

## 2022-04-26 NOTE — ASSESSMENT & PLAN NOTE
Results for CANELO AGUAYO (MRN [id number]) as of 4/26/2022 14:13   Ref. Range 8/4/2021 00:20 8/4/2021 01:45 8/4/2021 08:00 8/4/2021 11:57 8/4/2021 14:29 8/4/2021 21:25 10/18/2021 11:18 10/18/2021 11:46 3/24/2022 09:27 3/24/2022 09:29 3/24/2022 09:30 3/30/2022 07:44 3/30/2022 07:45   Glycohemoglobin Latest Ref Range: 0.0 - 5.6 % 12.3 (H)         7.7 (A)      May increase metformin   We can also start prior auth for glp-1 if she would like she will contemplate

## 2022-04-26 NOTE — PROGRESS NOTES
This medical record contains text that has been entered with the assistance of computer voice recognition and dictation software.  Therefore, it may contain unintended errors in text, spelling, punctuation, or grammar        Chief Complaint   Patient presents with   • Follow-Up     Lab results   • Dental Injury     Lost tooth in upper jaw - dentist wants to replace but she would like to talk about procedure with diabetes   • Wrist Injury     Right wrist injury from 2 weeks ago, lateral side. Getting better but still painful       Ailyn Saavedra is a 82 y.o. female here evaluation and management of:     Wrist injury   Has upcoming implant surgery       Current Outpatient Medications   Medication Sig Dispense Refill   • metFORMIN ER (GLUCOPHAGE XR) 500 MG TABLET SR 24 HR Take 4 Tablets by mouth every day. 360 Tablet 3   • Semaglutide,0.25 or 0.5MG/DOS, 2 MG/1.5ML Solution Pen-injector Per package instructions 3 Each 3   • glucose blood (CONTOUR NEXT TEST) strip Testing blood sugars one time per day.  E11.65 100 Strip 1   • Lancets Use one lancet to Test blood sugar three times daily before meals. (Patient taking differently: Use one lancet to Test blood sugar three times daily before meals.) 100 Each 0   • aspirin EC (ECOTRIN) 81 MG Tablet Delayed Response Take 162 mg by mouth one time. 2 tabs = 162 mg     • Alcohol Swabs Wipe site with prep pad prior to injection. (Patient not taking: Reported on 3/24/2022) 100 Each 0     No current facility-administered medications for this visit.     Patient Active Problem List    Diagnosis Date Noted   • Encounter for weight loss counseling 04/26/2022   • Injury of right wrist 04/26/2022   • Muscle cramping 03/24/2022   • Diarrhea 12/08/2021   • Diabetes mellitus (HCC) 08/26/2021   • Stress due to illness of family member 08/26/2021   • Abnormal CBC 08/26/2021   • Pain in the chest 08/03/2021   • Hyperglycemia 08/03/2021   • History of breast cancer 09/12/2019   • Broken  "heart syndrome 2015   • Cancer of skin of eyelid 2014     Past Surgical History:   Procedure Laterality Date   • HYSTEROSCOPY WITH VIDEO OPERATIVE  4/3/2013    Performed by Demetrio Webb M.D. at SURGERY SAME DAY [facility] ORS   • DILATION AND CURETTAGE  4/3/2013    Performed [facility].D. [facility]   • LUMPECTOMY     • OTHER      cataracts   • [place] RADIATION THERAPY PLAN SIMPLE        Social History     Tobacco Use   • Smoking status: Former Smoker     Packs/day: 0.50     Years: 3.00     Pack years: 1.50     Types: Cigarettes     Quit date: 1959     Years since quittin.3   • Smokeless tobacco: Never Used   Vaping Use   • Vaping Use: Never used   Substance Use Topics   • Alcohol use: Yes     Comment: Occasionally   • Drug use: No     Family History   Problem Relation Age of Onset   • Cancer Mother         breast    • Cancer Sister         breast    • Cancer Sister         breast    • Cancer Sister         breast            ROS    all review of system completed and negative except for those listed above     Objective:     /72 (BP Location: Left arm, Patient Position: Sitting, BP Cuff Size: Adult)   Pulse 65   Temp 36.7 °C (98 °F) (Temporal)   Resp 16   Ht 1.727 m (5' 8\")   Wt 72.6 kg (160 lb)   SpO2 94%  Body mass index is 24.33 kg/m².  Physical Exam:        GEN: comfortable, alert and oriented, well nourished, well developed, in no apparent distress   HEENT: NCAT, eyes: pupils equal and reactive, sclera white, EOMIT, good dentition  HEART: limbs warm and well perfused, regular rate, no JVD, no lower extremity edema  LUNGS: speaking in full sentences, not in apparent respiratory distress, no audible wheezes  MSK: normal tone and bulk, no swelling of the joints, gait steady and normal       Assessment and Plan:   The following treatment plan was discussed        Problem List Items Addressed This Visit     Diabetes mellitus (HCC)     Results for " CANELO AGUAYO (MRN [id number]) as of 4/26/2022 14:13   Ref. Range 8/4/2021 00:20 8/4/2021 01:45 8/4/2021 08:00 8/4/2021 11:57 8/4/2021 14:29 8/4/2021 21:25 10/18/2021 11:18 10/18/2021 11:46 3/24/2022 09:27 3/24/2022 09:29 3/24/2022 09:30 3/30/2022 07:44 3/30/2022 07:45   Glycohemoglobin Latest Ref Range: 0.0 - 5.6 % 12.3 (H)         7.7 (A)      May increase metformin   We can also start prior auth for glp-1 if she would like she will contemplate              Relevant Medications    metFORMIN ER (GLUCOPHAGE XR) 500 MG TABLET SR 24 HR    Semaglutide,0.25 or 0.5MG/DOS, 2 MG/1.5ML Solution Pen-injector    Encounter for weight loss counseling     Vitals 6/29/2019 9/12/2019 8/3/2021 8/3/2021 8/3/2021 8/5/2021   WEIGHT 205 202 189  193.56      Vitals 8/26/2021 10/28/2021 12/8/2021 3/24/2022 4/26/2022   WEIGHT 187.39 183.6 171 167 160   Intentional   Avoiding sugar dessert   Incredible wt loss past 3 years   Metformin and glp-1 to help with maintenance if desired            Injury of right wrist     Possible hyper extension injury to r wrist not a foosh but dove after a ball and connected with her racket   Will do x ray   Feels much better 2 weeks later   Follow up to review x ray              Relevant Orders    DX-WRIST-COMPLETE 3+ RIGHT                Instructed to follow up if symptoms worsen or fail to improve, ER/UC precautions discussed as well    Heidi Medrano MD  [facility], Family Medicine   [address] [place] [address] [address]kw[address]   [place] [address]  Phone: [phone]

## 2022-04-26 NOTE — ASSESSMENT & PLAN NOTE
Possible hyper extension injury to r wrist not a foosh but dove after a ball and connected with her racket   Will do x ray   Feels much better 2 weeks later   Follow up to review x ray

## 2022-05-03 ENCOUNTER — TELEPHONE (OUTPATIENT)
Dept: HEALTH INFORMATION MANAGEMENT | Facility: OTHER | Age: 82
End: 2022-05-03

## 2022-05-26 ENCOUNTER — OFFICE VISIT (OUTPATIENT)
Dept: MEDICAL GROUP | Facility: MEDICAL CENTER | Age: 82
End: 2022-05-26
Payer: MEDICARE

## 2022-05-26 VITALS
SYSTOLIC BLOOD PRESSURE: 126 MMHG | HEIGHT: 68 IN | BODY MASS INDEX: 24.19 KG/M2 | WEIGHT: 159.61 LBS | DIASTOLIC BLOOD PRESSURE: 78 MMHG

## 2022-05-26 DIAGNOSIS — S69.91XD INJURY OF RIGHT WRIST, SUBSEQUENT ENCOUNTER: ICD-10-CM

## 2022-05-26 DIAGNOSIS — E11.9 TYPE 2 DIABETES MELLITUS WITHOUT COMPLICATION, UNSPECIFIED WHETHER LONG TERM INSULIN USE (HCC): ICD-10-CM

## 2022-05-26 LAB — HBA1C MFR BLD: 7.9 % (ref 0–5.6)

## 2022-05-26 PROCEDURE — 99214 OFFICE O/P EST MOD 30 MIN: CPT | Performed by: FAMILY MEDICINE

## 2022-05-26 RX ORDER — DAPAGLIFLOZIN AND METFORMIN HYDROCHLORIDE 10; 1000 MG/1; MG/1
1 TABLET, FILM COATED, EXTENDED RELEASE ORAL DAILY
Qty: 90 TABLET | Refills: 3 | Status: SHIPPED | DISCHARGE
Start: 2022-05-26 | End: 2022-08-18

## 2022-05-26 ASSESSMENT — FIBROSIS 4 INDEX: FIB4 SCORE: 1.16

## 2022-05-26 NOTE — PROGRESS NOTES
"RN-CDE Note    Subjective:     HPI:  Ailyn Saavedra is a 82 y.o. old patient who is seen by the Diabetes Nurse Specialist today for review of her type 2 diabetes.    Changes in Health:  Complaining of leg and foot cramps at night.  States she hurt her right wrist playing tennis, plans to get xray today.     Diabetes Medications:   Metformin 1000 mg bid.   Taking above medications as prescribed: yes  Taking daily ASA: no    Exercise: usually plays tennis a couple times a week, on hold due to wrist injury.   Diet: \"healthy\" diet  in general, trying to limit her carbohydrate intake.   meals per day on average: 2-3  Patient's body mass index is 24.27 kg/m². Exercise and nutrition counseling were performed at this visit.      Health Maintenance:   Health Maintenance Due   Topic Date Due   • DIABETES MONOFILAMENT / LE EXAM  Never done   • RETINAL SCREENING  Never done   • IMM DTaP/Tdap/Td Vaccine (1 - Tdap) Never done         DM:   Last A1c:   Lab Results   Component Value Date/Time    HBA1C 7.9 (A) 05/26/2022 12:00 AM      Previous A1c was 7.7 on 03/24/2022  A1C GOAL: < 7    Glucose monitoring frequency: testing on occasion, states usually running in the 140-160 range.         Last Retinal Exam: due for exam.  states she had cancer on her left eye lid in the past  Daily Foot Exam: Yes     Exam:  Monofilament testing with a 10 gram force: sensation intact: intact bilaterally  Visual Inspection: Feet without maceration, ulcers, fissures.  Pedal pulses: intact bilaterally      Lab Results   Component Value Date/Time    MALBCRT see below 03/30/2022 07:44 AM    MICROALBUR <1.2 03/30/2022 07:44 AM        ACR Albumin/Creatinine Ratio goal <30     HTN:   Blood pressure goal <140/<80 at goal.   Currently Rx ACE/ARB: Not Indicated     Dyslipidemia:    Lab Results   Component Value Date/Time    CHOLSTRLTOT 207 (H) 03/30/2022 07:45 AM     (H) 03/30/2022 07:45 AM    HDL 72 03/30/2022 07:45 AM    TRIGLYCERIDE 95 03/30/2022 " 07:45 AM         Currently Rx Statin: No     She  reports that she quit smoking about 63 years ago. Her smoking use included cigarettes. She has a 1.50 pack-year smoking history. She has never used smokeless tobacco.      Plan:     Discussed and educated on:   - All medications, side effects and compliance (discussed carefully)  - Annual eye examinations at Ophthalmology  - HbA1C: target  - Home glucose monitoring emphasized  - Weight control and daily exercise    Recommended medication changes: She is not interested in a medication that would cause some weight loss, states she has lost weight recently and had to obtain new clothes.  Weight down from 85 kg to 72.4 kg since August.  She would like to work on diet at this time.

## 2022-05-26 NOTE — PROGRESS NOTES
This medical record contains text that has been entered with the assistance of computer voice recognition and dictation software.  Therefore, it may contain unintended errors in text, spelling, punctuation, or grammar        Chief Complaint   Patient presents with   • Follow-Up   • Diabetes Mellitus       Ailyn Saavedra is a 82 y.o. female here evaluation and management of:     Diabetes and wrist injury follow up       Current Outpatient Medications   Medication Sig Dispense Refill   • Dapagliflozin-metFORMIN HCl ER  MG TABLET SR 24 HR Take 1 Tablet by mouth every day. 90 Tablet 3   • glucose blood (CONTOUR NEXT TEST) strip Testing blood sugars one time per day.  E11.65 100 Strip 1   • Lancets Use one lancet to Test blood sugar three times daily before meals. (Patient taking differently: Use one lancet to Test blood sugar three times daily before meals.) 100 Each 0   • Alcohol Swabs Wipe site with prep pad prior to injection. (Patient not taking: Reported on 3/24/2022) 100 Each 0   • aspirin EC (ECOTRIN) 81 MG Tablet Delayed Response Take 162 mg by mouth one time. 2 tabs = 162 mg (Patient not taking: Reported on 5/26/2022)       No current facility-administered medications for this visit.     Patient Active Problem List    Diagnosis Date Noted   • Encounter for weight loss counseling 04/26/2022   • Injury of right wrist 04/26/2022   • Muscle cramping 03/24/2022   • Diarrhea 12/08/2021   • Diabetes mellitus (HCC) 08/26/2021   • Stress due to illness of family member 08/26/2021   • Abnormal CBC 08/26/2021   • Pain in the chest 08/03/2021   • Hyperglycemia 08/03/2021   • History of breast cancer 09/12/2019   • Broken heart syndrome 01/20/2015   • Cancer of skin of eyelid 04/30/2014     Past Surgical History:   Procedure Laterality Date   • HYSTEROSCOPY WITH VIDEO OPERATIVE  4/3/2013    Performed by Demetrio Webb M.D. at SURGERY SAME DAY [facility]   • DILATION AND CURETTAGE  4/3/2013    Performed by  "Demetrio Webb M.D. at SURGERY SAME DAY [facility] ORS   • LUMPECTOMY     • OTHER      cataracts   • [place] RADIATION THERAPY PLAN SIMPLE        Social History     Tobacco Use   • Smoking status: Former Smoker     Packs/day: 0.50     Years: 3.00     Pack years: 1.50     Types: Cigarettes     Quit date: 1959     Years since quittin.4   • Smokeless tobacco: Never Used   Vaping Use   • Vaping Use: Never used   Substance Use Topics   • Alcohol use: Yes     Comment: Occasionally   • Drug use: No     Family History   Problem Relation Age of Onset   • Cancer Mother         breast    • Cancer Sister         breast    • Cancer Sister         breast    • Cancer Sister         breast            ROS    all review of system completed and negative except for those listed above     Objective:     /78   Ht 1.727 m (5' 8\")   Wt 72.4 kg (159 lb 9.8 oz)  Body mass index is 24.27 kg/m².  Physical Exam:    Constitutional: Alert, no distress.  Skin: Warm, dry, good turgor, no rashes in visible areas.  Eye: Equal, round and reactive, conjunctiva clear, lids normal.  ENMT: Lips without lesions, good dentition, oropharynx clear.  Neck: Trachea midline, no masses, no thyromegaly. No cervical or supraclavicular lymphadenopathy.  Respiratory: Unlabored respiratory effort, lungs clear to auscultation, no wheezes, no ronchi.  Cardiovascular: Normal S1, S2, no murmur, no edema.  Abdomen: Soft, non-tender, no masses, no hepatosplenomegaly.  Psych: Alert and oriented x3, normal affect and mood.          Assessment and Plan:   The following treatment plan was discussed        Problem List Items Addressed This Visit     Diabetes mellitus (HCC)     Lab Results   Component Value Date/Time    HBA1C 7.9 (A) 2022 12:00 AM      Results for CANELO AGUAYO (MRN [id number]) as of 2022 14:13   Ref. Range 2021 00:20 2021 01:45 2021 08:00 2021 11:57 2021 14:29 2021 21:25 10/18/2021 11:18 10/18/2021 11:46 " 3/24/2022 09:27 3/24/2022 09:29 3/24/2022 09:30 3/30/2022 07:44 3/30/2022 07:45   Glycohemoglobin Latest Ref Range: 0.0 - 5.6 % 12.3 (H)         7.7 (A)          Declines GLP-1 so we will start SGLT2 antagonist/metformin combo   Risk benefit   Follow up 1 mo duel dm rn visit     Will discuss statin at follow up visit as well as encourage eye exam     30+ min spent                  Relevant Medications    Dapagliflozin-metFORMIN HCl ER  MG TABLET SR 24 HR    Other Relevant Orders    Referral to Optometry    Injury of right wrist     Has not been able to do x ray yet   Apparently she was turned away several times by our outpatient rads sites due to staffing shortages     Possible hyper extension injury to r wrist not a foosh but dove after a ball and connected with her racket     Feeling much better but has not gone back to tennis yet,   I encourage her to consider sports med eval as well as x ray                Relevant Orders    Referral to Sports Medicine                Instructed to follow up if symptoms worsen or fail to improve, ER/UC precautions discussed as well    Heidi Medrano MD  [facility], Family Medicine   [address] [place] [address] [place] [address]  Phone: [phone]

## 2022-05-26 NOTE — ASSESSMENT & PLAN NOTE
Has not been able to do x ray yet   Apparently she was turned away several times by our outpatient rads sites due to staffing shortages     Possible hyper extension injury to r wrist not a foosh but dove after a ball and connected with her racket     Feeling much better but has not gone back to tennis yet,   I encourage her to consider sports med eval as well as x ray

## 2022-05-26 NOTE — ASSESSMENT & PLAN NOTE
Lab Results   Component Value Date/Time    HBA1C 7.9 (A) 05/26/2022 12:00 AM      Results for CANELO AGUAYO (MRN [id number]) as of 4/26/2022 14:13   Ref. Range 8/4/2021 00:20 8/4/2021 01:45 8/4/2021 08:00 8/4/2021 11:57 8/4/2021 14:29 8/4/2021 21:25 10/18/2021 11:18 10/18/2021 11:46 3/24/2022 09:27 3/24/2022 09:29 3/24/2022 09:30 3/30/2022 07:44 3/30/2022 07:45   Glycohemoglobin Latest Ref Range: 0.0 - 5.6 % 12.3 (H)         7.7 (A)          Declines GLP-1 so we will start SGLT2 antagonist/metformin combo   Risk benefit   Follow up 1 mo duel dm rn visit     Will discuss statin at follow up visit as well as encourage eye exam     30+ min spent

## 2022-06-03 ENCOUNTER — HOSPITAL ENCOUNTER (OUTPATIENT)
Dept: RADIOLOGY | Facility: MEDICAL CENTER | Age: 82
End: 2022-06-03
Attending: FAMILY MEDICINE
Payer: MEDICARE

## 2022-06-03 DIAGNOSIS — S69.91XD INJURY OF RIGHT WRIST, SUBSEQUENT ENCOUNTER: ICD-10-CM

## 2022-06-03 PROCEDURE — 73110 X-RAY EXAM OF WRIST: CPT | Mod: RT

## 2022-06-07 ENCOUNTER — TELEPHONE (OUTPATIENT)
Dept: MEDICAL GROUP | Facility: MEDICAL CENTER | Age: 82
End: 2022-06-07
Payer: MEDICARE

## 2022-06-07 DIAGNOSIS — S69.90XS INJURY OF WRIST, UNSPECIFIED LATERALITY, SEQUELA: ICD-10-CM

## 2022-06-07 NOTE — TELEPHONE ENCOUNTER
----- Message from Heidi Medrano M.D. sent at 6/7/2022  1:26 PM PDT -----  Findings of x ray indicate that she should consult with ortho surgeon   Referral placed

## 2022-06-07 NOTE — TELEPHONE ENCOUNTER
Phone Number Called: [phone] (home)     Call outcome: Did not leave a detailed message. Requested patient to call back.    Message: LVM for pt. To call back for results.

## 2022-06-08 ENCOUNTER — TELEPHONE (OUTPATIENT)
Dept: MEDICAL GROUP | Facility: MEDICAL CENTER | Age: 82
End: 2022-06-08
Payer: MEDICARE

## 2022-06-08 NOTE — TELEPHONE ENCOUNTER
Phone Number Called: [phone] (home)     Call outcome: Did not leave a detailed message. Requested patient to call back.    Message: Re x-ray results

## 2022-08-09 ENCOUNTER — APPOINTMENT (RX ONLY)
Dept: URBAN - METROPOLITAN AREA CLINIC 4 | Facility: CLINIC | Age: 82
Setting detail: DERMATOLOGY
End: 2022-08-09

## 2022-08-09 DIAGNOSIS — L81.4 OTHER MELANIN HYPERPIGMENTATION: ICD-10-CM

## 2022-08-09 DIAGNOSIS — D18.0 HEMANGIOMA: ICD-10-CM

## 2022-08-09 DIAGNOSIS — L72.8 OTHER FOLLICULAR CYSTS OF THE SKIN AND SUBCUTANEOUS TISSUE: ICD-10-CM

## 2022-08-09 DIAGNOSIS — Z85.828 PERSONAL HISTORY OF OTHER MALIGNANT NEOPLASM OF SKIN: ICD-10-CM

## 2022-08-09 DIAGNOSIS — D22 MELANOCYTIC NEVI: ICD-10-CM

## 2022-08-09 DIAGNOSIS — L82.1 OTHER SEBORRHEIC KERATOSIS: ICD-10-CM

## 2022-08-09 PROBLEM — D48.5 NEOPLASM OF UNCERTAIN BEHAVIOR OF SKIN: Status: ACTIVE | Noted: 2022-08-09

## 2022-08-09 PROBLEM — D22.5 MELANOCYTIC NEVI OF TRUNK: Status: ACTIVE | Noted: 2022-08-09

## 2022-08-09 PROBLEM — D18.01 HEMANGIOMA OF SKIN AND SUBCUTANEOUS TISSUE: Status: ACTIVE | Noted: 2022-08-09

## 2022-08-09 PROCEDURE — 11102 TANGNTL BX SKIN SINGLE LES: CPT

## 2022-08-09 PROCEDURE — ? OBSERVATION

## 2022-08-09 PROCEDURE — ? COUNSELING

## 2022-08-09 PROCEDURE — ? BIOPSY BY SHAVE METHOD

## 2022-08-09 PROCEDURE — 99213 OFFICE O/P EST LOW 20 MIN: CPT | Mod: 25

## 2022-08-09 ASSESSMENT — LOCATION ZONE DERM
LOCATION ZONE: AXILLAE
LOCATION ZONE: NOSE
LOCATION ZONE: FACE
LOCATION ZONE: TRUNK

## 2022-08-09 ASSESSMENT — LOCATION SIMPLE DESCRIPTION DERM
LOCATION SIMPLE: ABDOMEN
LOCATION SIMPLE: UPPER BACK
LOCATION SIMPLE: LEFT UPPER BACK
LOCATION SIMPLE: RIGHT UPPER BACK
LOCATION SIMPLE: RIGHT TEMPLE
LOCATION SIMPLE: LEFT FOREHEAD
LOCATION SIMPLE: NOSE
LOCATION SIMPLE: RIGHT AXILLARY VAULT

## 2022-08-09 ASSESSMENT — LOCATION DETAILED DESCRIPTION DERM
LOCATION DETAILED: RIGHT CENTRAL TEMPLE
LOCATION DETAILED: RIGHT AXILLARY VAULT
LOCATION DETAILED: NASAL SUPRATIP
LOCATION DETAILED: PERIUMBILICAL SKIN
LOCATION DETAILED: RIGHT SUPERIOR MEDIAL UPPER BACK
LOCATION DETAILED: LEFT SUPERIOR MEDIAL UPPER BACK
LOCATION DETAILED: INFERIOR THORACIC SPINE
LOCATION DETAILED: EPIGASTRIC SKIN
LOCATION DETAILED: LEFT INFERIOR LATERAL FOREHEAD

## 2022-08-09 NOTE — PROCEDURE: REASSURANCE
Hide Include Location In Plan Question?: No
Detail Level: Generalized
Detail Level: Zone
Include Location In Plan?: Yes
Detail Level: Detailed

## 2022-08-09 NOTE — PROCEDURE: BIOPSY BY SHAVE METHOD
Detail Level: Detailed
Depth Of Biopsy: dermis
Was A Bandage Applied: Yes
Size Of Lesion In Cm: 0
Anticipated Plan (Based On Presumed Biopsy Results): Imiquimod
Biopsy Type: H and E
Biopsy Method: Personna blade
Anesthesia Type: 0.5% lidocaine without epinephrine
Anesthesia Volume In Cc: 3
Hemostasis: Drysol and Electrocautery
Wound Care: Vaseline
Dressing: Band-Aid
Destruction After The Procedure: No
Type Of Destruction Used: Curettage
Curettage Text: The wound bed was treated with curettage after the biopsy was performed.
Electrodesiccation Text: The wound bed was treated with electrodesiccation after the biopsy was performed.
Electrodesiccation And Curettage Text: The wound bed was treated with electrodesiccation and curettage after the biopsy was performed.
Lab: [id number]
Lab Facility: 
Consent: Verbal consent was obtained and risks were reviewed including but not limited to scarring, infection, bleeding, scabbing, incomplete removal, nerve damage and allergy to anesthesia.
Post-Care Instructions: I reviewed with the patient in detail post-care instructions. Patient is to keep the biopsy site dry overnight, and then apply vasaline twice daily until healed.
Notification Instructions: Patient will be notified of biopsy results. However, patient instructed to call the office if not contacted within 2 weeks.
Billing Type: Third-Party Bill
Information: Selecting Yes will display possible errors in your note based on the variables you have selected. This validation is only offered as a suggestion for you. PLEASE NOTE THAT THE VALIDATION TEXT WILL BE REMOVED WHEN YOU FINALIZE YOUR NOTE. IF YOU WANT TO FAX A PRELIMINARY NOTE YOU WILL NEED TO TOGGLE THIS TO 'NO' IF YOU DO NOT WANT IT IN YOUR FAXED NOTE.

## 2022-08-18 ENCOUNTER — OFFICE VISIT (OUTPATIENT)
Dept: MEDICAL GROUP | Facility: MEDICAL CENTER | Age: 82
End: 2022-08-18
Payer: MEDICARE

## 2022-08-18 VITALS
HEIGHT: 68 IN | BODY MASS INDEX: 23.15 KG/M2 | SYSTOLIC BLOOD PRESSURE: 112 MMHG | DIASTOLIC BLOOD PRESSURE: 64 MMHG | WEIGHT: 152.78 LBS

## 2022-08-18 DIAGNOSIS — Z79.4 TYPE 2 DIABETES MELLITUS WITH OTHER SPECIFIED COMPLICATION, WITH LONG-TERM CURRENT USE OF INSULIN (HCC): ICD-10-CM

## 2022-08-18 DIAGNOSIS — S69.91XD INJURY OF RIGHT WRIST, SUBSEQUENT ENCOUNTER: ICD-10-CM

## 2022-08-18 DIAGNOSIS — E11.69 TYPE 2 DIABETES MELLITUS WITH OTHER SPECIFIED COMPLICATION, WITH LONG-TERM CURRENT USE OF INSULIN (HCC): ICD-10-CM

## 2022-08-18 DIAGNOSIS — E11.9 TYPE 2 DIABETES MELLITUS WITHOUT COMPLICATION, UNSPECIFIED WHETHER LONG TERM INSULIN USE (HCC): ICD-10-CM

## 2022-08-18 LAB
HBA1C MFR BLD: 7.2 % (ref 0–5.6)
INT CON NEG: ABNORMAL
INT CON POS: ABNORMAL

## 2022-08-18 PROCEDURE — 99214 OFFICE O/P EST MOD 30 MIN: CPT | Performed by: FAMILY MEDICINE

## 2022-08-18 PROCEDURE — 83036 HEMOGLOBIN GLYCOSYLATED A1C: CPT | Performed by: FAMILY MEDICINE

## 2022-08-18 RX ORDER — METFORMIN HYDROCHLORIDE 500 MG/1
TABLET, EXTENDED RELEASE ORAL
Qty: 400 TABLET | Refills: 3 | Status: SHIPPED | OUTPATIENT
Start: 2022-08-18 | End: 2023-11-30 | Stop reason: SDUPTHER

## 2022-08-18 ASSESSMENT — FIBROSIS 4 INDEX: FIB4 SCORE: 1.16

## 2022-08-18 NOTE — ASSESSMENT & PLAN NOTE
Reminder to do eye exam   No med changes today (never started xigduo and that's ok we will remove from med list )     Lab Results   Component Value Date/Time    HBA1C 7.2 (A) 08/18/2022 08:47 AM      See dm rn note for more details   Labs from march reviewed   Encouraged to do new set of blood work     1 mo duel dm rn md follow up     30+ min spent

## 2022-08-18 NOTE — PROGRESS NOTES
This medical record contains text that has been entered with the assistance of computer voice recognition and dictation software.  Therefore, it may contain unintended errors in text, spelling, punctuation, or grammar        Chief Complaint   Patient presents with    Diabetes Follow-up       Ailyn Saavedra is a 82 y.o. female here evaluation and management of:    Feels well in her usual state of health   Current Outpatient Medications   Medication Sig Dispense Refill    metFORMIN ER (GLUCOPHAGE XR) 500 MG TABLET SR 24 HR TAKE 4 TABLETS BY MOUTH ONCE DAILY 400 Tablet 3    aspirin EC (ECOTRIN) 81 MG Tablet Delayed Response Take 162 mg by mouth one time. 2 tabs = 162 mg      glucose blood (CONTOUR NEXT TEST) strip Testing blood sugars one time per day.  E11.65 100 Strip 1    Lancets Use one lancet to Test blood sugar three times daily before meals. (Patient taking differently: Use one lancet to Test blood sugar three times daily before meals.) 100 Each 0    Alcohol Swabs Wipe site with prep pad prior to injection. (Patient not taking: Reported on 3/24/2022) 100 Each 0     No current facility-administered medications for this visit.     Patient Active Problem List    Diagnosis Date Noted    Encounter for weight loss counseling 04/26/2022    Injury of right wrist 04/26/2022    Muscle cramping 03/24/2022    Diarrhea 12/08/2021    Diabetes mellitus (HCC) 08/26/2021    Stress due to illness of family member 08/26/2021    Abnormal CBC 08/26/2021    Pain in the chest 08/03/2021    Hyperglycemia 08/03/2021    History of breast cancer 09/12/2019    Broken heart syndrome 01/20/2015    Cancer of skin of eyelid 04/30/2014     Past Surgical History:   Procedure Laterality Date    HYSTEROSCOPY WITH VIDEO OPERATIVE  4/3/2013    [facility]    DILATION AND CURETTAGE  4/3/2013    Performed by [facility] Webb M.D. [facility]    LUMPECTOMY      OTHER   "    cataracts    [place] RADIATION THERAPY PLAN SIMPLE        Social History     Tobacco Use    Smoking status: Former     Packs/day: 0.50     Years: 3.00     Pack years: 1.50     Types: Cigarettes     Quit date: 1959     Years since quittin.6    Smokeless tobacco: Never   Vaping Use    Vaping Use: Never used   Substance Use Topics    Alcohol use: Yes     Comment: Occasionally    Drug use: No     Family History   Problem Relation Age of Onset    Cancer Mother         breast     Cancer Sister         breast     Cancer Sister         breast     Cancer Sister         breast            ROS    all review of system completed and negative except for those listed above     Objective:     /64 (BP Location: Right arm, Patient Position: Sitting, BP Cuff Size: Small adult)   Ht 1.727 m (5' 8\")   Wt 69.3 kg (152 lb 12.5 oz)  Body mass index is 23.23 kg/m².  Physical Exam:    Constitutional: Alert, no distress.  Skin: Warm, dry, good turgor, no rashes in visible areas.  Eye: Equal, round and reactive, conjunctiva clear, lids normal.  ENMT: Lips without lesions, good dentition, oropharynx clear.  Neck: Trachea midline, no masses, no thyromegaly. No cervical or supraclavicular lymphadenopathy.  Respiratory: Unlabored respiratory effort, lungs clear to auscultation, no wheezes, no ronchi.  Cardiovascular: Normal S1, S2, no murmur, no edema.  Abdomen: Soft, non-tender, no masses, no hepatosplenomegaly.  Psych: Alert and oriented x3, normal affect and mood.          Assessment and Plan:   The following treatment plan was discussed        Problem List Items Addressed This Visit       Diabetes mellitus (HCC)     Reminder to do eye exam   No med changes today (never started xigduo and that's ok we will remove from med list )     Lab Results   Component Value Date/Time    HBA1C 7.2 (A) 2022 08:47 AM      See dm rn note for more details   Labs from march reviewed   Encouraged to do new set of blood work     1 mo duel dm " rn md follow up     30+ min spent            Relevant Medications    metFORMIN ER (GLUCOPHAGE XR) 500 MG TABLET SR 24 HR    Other Relevant Orders    POCT Hemoglobin A1C (Completed)    Lipid Profile    Basic Metabolic Panel    MICROALBUMIN CREAT RATIO URINE    Referral to Optometry    Injury of right wrist     She feels 100% better   Never consulted with [facility]   Back to playing tennis                       Instructed to follow up if symptoms worsen or fail to improve, ER/UC precautions discussed as well    Heidi Medrano MD  [facility], Family Medicine   [address] [place] [address]   [place] [address]  Phone: [phone]

## 2022-08-18 NOTE — PROGRESS NOTES
"RN-CDE Note    Subjective:     HPI:  Ailyn Saavedra is a 82 y.o. old patient who is seen by the Diabetes Nurse Specialist today for review of her type 2 diabetes.     Changes in Health: denies any changes.     Diabetes Medications:   Metformin 500 mg 4 times per day  Taking above medications as prescribed: yes  Taking daily ASA: NO, takes several times a week \"when she thinks about it\"    Exercise: playing tennis, twice a week.   Diet: \"healthy\" diet  in general  Patient's body mass index is 23.23 kg/m². Exercise and nutrition counseling were performed at this visit.      Health Maintenance:   Health Maintenance Due   Topic Date Due    IMM HEP B VACCINE (1 of 3 - 3-dose series) Never done    RETINAL SCREENING  Never done    IMM DTaP/Tdap/Td Vaccine (1 - Tdap) Never done    COVID-19 Vaccine (4 - Booster for Moderna series) 04/02/2022         DM:   Last A1c:   Lab Results   Component Value Date/Time    HBA1C 7.2 (A) 08/18/2022 08:47 AM      Previous A1c was 7.9 on 5/26/22  A1C GOAL: < 7.5    Glucose monitoring frequency: testing on occasion.   130-150 range when she tests.     Hypoglycemic episodes: no    Last Retinal Exam:  due for eye exam.   Daily Foot Exam: Yes     Exam:  Monofilament: not done, current as of 5/26/22    Lab Results   Component Value Date/Time    MALBCRT see below 03/30/2022 07:44 AM    MICROALBUR <1.2 03/30/2022 07:44 AM        ACR Albumin/Creatinine Ratio goal <30     HTN:   Blood pressure goal <140/<80 at goal.   Currently Rx ACE/ARB: Not Indicated     Dyslipidemia:    Lab Results   Component Value Date/Time    CHOLSTRLTOT 207 (H) 03/30/2022 07:45 AM     (H) 03/30/2022 07:45 AM    HDL 72 03/30/2022 07:45 AM    TRIGLYCERIDE 95 03/30/2022 07:45 AM         Currently Rx Statin: No     She  reports that she quit smoking about 63 years ago. Her smoking use included cigarettes. She has a 1.50 pack-year smoking history. She has never used smokeless tobacco.      Plan:     Discussed and " educated on:   - All medications, side effects and compliance (discussed carefully)  - Annual eye examinations at Ophthalmology  - Home glucose monitoring emphasized  - Weight control and daily exercise    Recommended medication changes: no change

## 2022-08-18 NOTE — LETTER
August 18, 2022        Ailyn Saavedra  [address]  [place] [address]          Current Outpatient Medications:   •  metFORMIN ER, TAKE 4 TABLETS BY MOUTH ONCE DAILY, Taking  •  aspirin EC, 162 mg, Oral, Once, PRN  •  Contour Next Test, Testing blood sugars one time per day.  E11.65  •  Lancets, Use one lancet to Test blood sugar three times daily before meals. (Patient taking differently: Use one lancet to Test blood sugar three times daily before meals.)  •  Alcohol Swabs, Wipe site with prep pad prior to injection. (Patient not taking: Reported on 3/24/2022)      If you have any questions or concerns, please don't hesitate to call.        Sincerely,        Heidi Medrano M.D.    Electronically Signed

## 2022-09-07 RX ORDER — IMIQUIMOD 12.5 MG/.25G
1 CREAM TOPICAL QD
Qty: 6 | Refills: 1 | Status: ERX | COMMUNITY
Start: 2022-09-07

## 2022-09-07 RX ADMIN — IMIQUIMOD 1: 12.5 CREAM TOPICAL at 00:00

## 2022-10-06 ENCOUNTER — NON-PROVIDER VISIT (OUTPATIENT)
Dept: MEDICAL GROUP | Facility: MEDICAL CENTER | Age: 82
End: 2022-10-06
Payer: MEDICARE

## 2022-10-06 VITALS
DIASTOLIC BLOOD PRESSURE: 64 MMHG | SYSTOLIC BLOOD PRESSURE: 98 MMHG | HEIGHT: 68 IN | WEIGHT: 150.2 LBS | BODY MASS INDEX: 22.76 KG/M2

## 2022-10-06 LAB — HBA1C MFR BLD: 7 % (ref 0–5.6)

## 2022-10-06 PROCEDURE — 99211 OFF/OP EST MAY X REQ PHY/QHP: CPT | Performed by: FAMILY MEDICINE

## 2022-10-06 ASSESSMENT — FIBROSIS 4 INDEX: FIB4 SCORE: 1.16

## 2022-10-06 NOTE — PROGRESS NOTES
"RN-CDE Note    Subjective:     HPI:  Ailyn Saavedra is a 82 y.o. old patient who is seen by the Diabetes Nurse Specialist today for review of her type 2 diabetes.    Changes in Health: complaining that she is losing weight without trying.  She has a history of breast cancer and a strong family history of siblings dying of cancer.  She is worried that she may have cancer again.  She admits to needing a mammogram and hasn't had a pap smear in a while  Diabetes Medications:   Metformin 500 mg 3 times a day  Taking above medications as prescribed: yes  Taking daily ASA: Yes    Exercise: playing tennis 2 times a week for about 2 hours.   Diet: \"healthy\" diet  in general  Does admit to having decreased appetite.   Patient's body mass index is 22.84 kg/m². Exercise and nutrition counseling were performed at this visit.      Health Maintenance:   Health Maintenance Due   Topic Date Due    IMM HEP B VACCINE (1 of 3 - 3-dose series) Never done    RETINAL SCREENING  Never done    IMM DTaP/Tdap/Td Vaccine (1 - Tdap) Never done    IMM INFLUENZA (1) 09/01/2022    MAMMOGRAM  10/18/2022         DM:   Last A1c:   Lab Results   Component Value Date/Time    HBA1C 7 (A) 10/06/2022 12:00 AM      Previous A1c was 7.2 on 8/18/22  A1C GOAL: < 7    Glucose monitoring frequency: testing several times a week.  Blood sugars running in the 150-170 range.  Tends to check mid afternoon or hs.     Last Retinal Exam:  past due for eye exam.  Reminded her to get.   Daily Foot Exam: Yes     Exam:  Monofilament: current.     Lab Results   Component Value Date/Time    MALBCRT see below 03/30/2022 07:44 AM    MICROALBUR <1.2 03/30/2022 07:44 AM        ACR Albumin/Creatinine Ratio goal <30     HTN:   Blood pressure goal <140/<80 at target. .   Currently Rx ACE/ARB: Not Indicated     Dyslipidemia:    Lab Results   Component Value Date/Time    CHOLSTRLTOT 207 (H) 03/30/2022 07:45 AM     (H) 03/30/2022 07:45 AM    HDL 72 03/30/2022 07:45 AM    " TRIGLYCERIDE 95 03/30/2022 07:45 AM         Currently Rx Statin: No     She  reports that she quit smoking about 63 years ago. Her smoking use included cigarettes. She has a 1.50 pack-year smoking history. She has never used smokeless tobacco.      Plan:     Discussed and educated on:   - All medications, side effects and compliance (discussed carefully)  - Annual eye examinations at Ophthalmology  - HbA1C: target  - Home glucose monitoring emphasized  - Weight control and daily exercise    Recommended medication changes: none at this time.   Has a follow up with Dr. Medrano on 10/18/22

## 2022-10-18 ENCOUNTER — OFFICE VISIT (OUTPATIENT)
Dept: MEDICAL GROUP | Facility: MEDICAL CENTER | Age: 82
End: 2022-10-18
Payer: MEDICARE

## 2022-10-18 VITALS
RESPIRATION RATE: 16 BRPM | OXYGEN SATURATION: 97 % | TEMPERATURE: 97.7 F | WEIGHT: 149.91 LBS | HEART RATE: 67 BPM | HEIGHT: 68 IN | DIASTOLIC BLOOD PRESSURE: 76 MMHG | BODY MASS INDEX: 22.72 KG/M2 | SYSTOLIC BLOOD PRESSURE: 140 MMHG

## 2022-10-18 DIAGNOSIS — R03.0 ELEVATED BLOOD PRESSURE READING: ICD-10-CM

## 2022-10-18 DIAGNOSIS — Z79.4 TYPE 2 DIABETES MELLITUS WITH OTHER SPECIFIED COMPLICATION, WITH LONG-TERM CURRENT USE OF INSULIN (HCC): ICD-10-CM

## 2022-10-18 DIAGNOSIS — E11.69 TYPE 2 DIABETES MELLITUS WITH OTHER SPECIFIED COMPLICATION, WITH LONG-TERM CURRENT USE OF INSULIN (HCC): ICD-10-CM

## 2022-10-18 PROCEDURE — 99214 OFFICE O/P EST MOD 30 MIN: CPT | Performed by: FAMILY MEDICINE

## 2022-10-18 ASSESSMENT — FIBROSIS 4 INDEX: FIB4 SCORE: 1.16

## 2022-10-18 NOTE — PROGRESS NOTES
This medical record contains text that has been entered with the assistance of computer voice recognition and dictation software.  Therefore, it may contain unintended errors in text, spelling, punctuation, or grammar        Chief Complaint   Patient presents with    Follow-Up     Pt saw Savita CANALES on 10/6. Follow up after that. Otherwise she would like to discuss trouble sleeping       Ailyn Saavedra is a 82 y.o. female here evaluation and management of:     Current Outpatient Medications   Medication Sig Dispense Refill    metFORMIN ER (GLUCOPHAGE XR) 500 MG TABLET SR 24 HR TAKE 4 TABLETS BY MOUTH ONCE DAILY (Patient taking differently: Taking 500 mg tid) 400 Tablet 3    glucose blood (CONTOUR NEXT TEST) strip Testing blood sugars one time per day.  E11.65 100 Strip 1    Lancets Use one lancet to Test blood sugar three times daily before meals. (Patient taking differently: Use one lancet to Test blood sugar three times daily before meals.) 100 Each 0    aspirin EC (ECOTRIN) 81 MG Tablet Delayed Response Take 162 mg by mouth one time. 2 tabs = 162 mg      Alcohol Swabs Wipe site with prep pad prior to injection. (Patient not taking: Reported on 3/24/2022) 100 Each 0     No current facility-administered medications for this visit.     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 at SURGERY SAME DAY  "[facility]    DILATION AND CURETTAGE  4/3/2013    Performed by Demetrio Webb M.D. at SURGERY SAME DAY [facility]    LUMPECTOMY      OTHER      cataracts    [place] RADIATION THERAPY PLAN SIMPLE        Social History     Tobacco Use    Smoking status: Former     Packs/day: 0.50     Years: 3.00     Pack years: 1.50     Types: Cigarettes     Quit date: 1959     Years since quittin.8    Smokeless tobacco: Never   Vaping Use    Vaping Use: Never used   Substance Use Topics    Alcohol use: Yes     Comment: Occasionally    Drug use: No     Family History   Problem Relation Age of Onset    Cancer Mother         breast     Cancer Sister         breast     Cancer Sister         breast     Cancer Sister         breast            ROS    all review of system completed and negative except for those listed above     Objective:     BP (!) 140/76 (BP Location: Left arm, Patient Position: Sitting, BP Cuff Size: Small adult)   Pulse 67   Temp 36.5 °C (97.7 °F) (Temporal)   Resp 16   Ht 1.727 m (5' 8\")   Wt 68 kg (149 lb 14.6 oz)   SpO2 97%  Body mass index is 22.79 kg/m².  Physical Exam:    Constitutional: Alert, no distress.  Skin: Warm, dry, good turgor, no rashes in visible areas.  Eye: Equal, round and reactive, conjunctiva clear, lids normal.  ENMT: Lips without lesions, good dentition, oropharynx clear.  Neck: Trachea midline, no masses, no thyromegaly. No cervical or supraclavicular lymphadenopathy.  Respiratory: Unlabored respiratory effort, lungs clear to auscultation, no wheezes, no ronchi.  Cardiovascular: Normal S1, S2, no murmur, no edema.  Abdomen: Soft, non-tender, no masses, no hepatosplenomegaly.  Psych: Alert and oriented x3, normal affect and mood.          Assessment and Plan:   The following treatment plan was discussed        Problem List Items Addressed This Visit       Diabetes mellitus (HCC)     Lab Results   Component Value Date/Time    HBA1C 7 (A) 10/06/2022 12:00 AM      Overall good " control   Plan for 1 mo shi garcia rn md visit labs prior     Care gaps discussed and encouraged     30+ min spent          Relevant Orders    Basic Metabolic Panel    Referral to Optometry    Lipid Profile    MICROALBUMIN CREAT RATIO URINE    Elevated blood pressure reading     No changes today   Consider start ACEI                         Instructed to follow up if symptoms worsen or fail to improve, ER/UC precautions discussed as well    Heidi Medrano MD  [facility], [address]   [place] [address]  Phone: [phone]

## 2022-10-18 NOTE — ASSESSMENT & PLAN NOTE
Lab Results   Component Value Date/Time    HBA1C 7 (A) 10/06/2022 12:00 AM      Overall good control   Plan for 1 mo duel dm rn md visit labs prior     Care gaps discussed and encouraged     30+ min spent

## 2022-10-20 ENCOUNTER — HOSPITAL ENCOUNTER (OUTPATIENT)
Dept: RADIOLOGY | Facility: MEDICAL CENTER | Age: 82
End: 2022-10-20
Attending: FAMILY MEDICINE
Payer: MEDICARE

## 2022-10-20 DIAGNOSIS — Z12.31 VISIT FOR SCREENING MAMMOGRAM: ICD-10-CM

## 2022-10-20 PROCEDURE — 77063 BREAST TOMOSYNTHESIS BI: CPT

## 2022-12-15 ENCOUNTER — APPOINTMENT (RX ONLY)
Dept: URBAN - METROPOLITAN AREA CLINIC 4 | Facility: CLINIC | Age: 82
Setting detail: DERMATOLOGY
End: 2022-12-15

## 2022-12-15 DIAGNOSIS — Z09 ENCOUNTER FOR FOLLOW-UP EXAMINATION AFTER COMPLETED TREATMENT FOR CONDITIONS OTHER THAN MALIGNANT NEOPLASM: ICD-10-CM | Status: RESOLVED

## 2022-12-15 PROCEDURE — 99212 OFFICE O/P EST SF 10 MIN: CPT

## 2022-12-15 PROCEDURE — ? OBSERVATION

## 2022-12-15 ASSESSMENT — LOCATION SIMPLE DESCRIPTION DERM: LOCATION SIMPLE: LEFT PRETIBIAL REGION

## 2022-12-15 ASSESSMENT — LOCATION DETAILED DESCRIPTION DERM: LOCATION DETAILED: LEFT PROXIMAL PRETIBIAL REGION

## 2022-12-15 ASSESSMENT — LOCATION ZONE DERM: LOCATION ZONE: LEG

## 2022-12-21 PROBLEM — R07.9 PAIN IN THE CHEST: Status: RESOLVED | Noted: 2021-08-03 | Resolved: 2022-12-21

## 2022-12-21 PROBLEM — R79.89 ABNORMAL CBC: Status: RESOLVED | Noted: 2021-08-26 | Resolved: 2022-12-21

## 2022-12-21 PROBLEM — R73.9 HYPERGLYCEMIA: Status: RESOLVED | Noted: 2021-08-03 | Resolved: 2022-12-21

## 2022-12-21 PROBLEM — R03.0 ELEVATED BLOOD PRESSURE READING: Status: RESOLVED | Noted: 2022-10-18 | Resolved: 2022-12-21

## 2022-12-21 PROBLEM — R19.7 DIARRHEA: Status: RESOLVED | Noted: 2021-12-08 | Resolved: 2022-12-21

## 2023-01-04 ENCOUNTER — TELEPHONE (OUTPATIENT)
Dept: SCHEDULING | Facility: IMAGING CENTER | Age: 83
End: 2023-01-04

## 2023-01-04 NOTE — TELEPHONE ENCOUNTER
PT REQ BEING RS FOR HER January 5 APPT WITH BULMARO COLMENARES. CALLED PT TO RS BUT WENT TO     PLEASE TRANSFER TO [facility] [phone]

## 2023-01-06 ENCOUNTER — TELEPHONE (OUTPATIENT)
Dept: MEDICAL GROUP | Facility: MEDICAL CENTER | Age: 83
End: 2023-01-06
Payer: MEDICARE

## 2023-01-06 NOTE — TELEPHONE ENCOUNTER
Pt called requesting a letter stating that she cannot take her garbage can to the curb when there is a lot of snow on the ground. She states that in the past week she already fell once trying to take the dumpster out.     Dr Medrano would you be able to write letter for pt, or need appt?    Please advise

## 2023-01-10 NOTE — TELEPHONE ENCOUNTER
Pt with an sofie as follow:  Future Appointments         Provider Department [place]    1/10/2023 4:00 PM (Arrive by 3:45 PM) Stevan Brady M.D. [facility]  [place] [facility]

## 2023-01-13 ENCOUNTER — OFFICE VISIT (OUTPATIENT)
Dept: MEDICAL GROUP | Facility: MEDICAL CENTER | Age: 83
End: 2023-01-13
Payer: MEDICARE

## 2023-01-13 VITALS
SYSTOLIC BLOOD PRESSURE: 128 MMHG | TEMPERATURE: 97.3 F | DIASTOLIC BLOOD PRESSURE: 80 MMHG | BODY MASS INDEX: 23.05 KG/M2 | OXYGEN SATURATION: 97 % | HEART RATE: 70 BPM | HEIGHT: 68 IN | WEIGHT: 152.12 LBS | RESPIRATION RATE: 16 BRPM

## 2023-01-13 DIAGNOSIS — Z79.4 TYPE 2 DIABETES MELLITUS WITH OTHER SPECIFIED COMPLICATION, WITH LONG-TERM CURRENT USE OF INSULIN (HCC): ICD-10-CM

## 2023-01-13 DIAGNOSIS — E11.69 TYPE 2 DIABETES MELLITUS WITH OTHER SPECIFIED COMPLICATION, WITH LONG-TERM CURRENT USE OF INSULIN (HCC): ICD-10-CM

## 2023-01-13 PROCEDURE — 7101 PR PHYSICAL: Performed by: FAMILY MEDICINE

## 2023-01-13 ASSESSMENT — FIBROSIS 4 INDEX: FIB4 SCORE: 1.16

## 2023-01-13 NOTE — PROGRESS NOTES
This medical record contains text that has been entered with the assistance of computer voice recognition and dictation software.  Therefore, it may contain unintended errors in text, spelling, punctuation, or grammar        Chief Complaint   Patient presents with    Letter for School/Work     Pt has a hard time getting her garbage can to the curb when there is snow       Ailyn Saavedra is a 82 y.o. female here evaluation and management of:         Current Outpatient Medications   Medication Sig Dispense Refill    metFORMIN ER (GLUCOPHAGE XR) 500 MG TABLET SR 24 HR TAKE 4 TABLETS BY MOUTH ONCE DAILY (Patient taking differently: Taking 500 mg tid) 400 Tablet 3    glucose blood (CONTOUR NEXT TEST) strip Testing blood sugars one time per day.  E11.65 100 Strip 1    Lancets Use one lancet to Test blood sugar three times daily before meals. (Patient taking differently: Use one lancet to Test blood sugar three times daily before meals.) 100 Each 0    aspirin EC (ECOTRIN) 81 MG Tablet Delayed Response Take 162 mg by mouth one time. 2 tabs = 162 mg      Alcohol Swabs Wipe site with prep pad prior to injection. (Patient not taking: Reported on 3/24/2022) 100 Each 0     No current facility-administered medications for this visit.     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 at SURGERY SAME DAY [facility]    DILATION AND CURETTAGE  4/3/2013    Performed by [facility] Webb M.D. [facility]    LUMPECTOMY      OTHER      cataracts     "[place] RADIATION THERAPY PLAN SIMPLE        Social History     Tobacco Use    Smoking status: Former     Packs/day: 0.50     Years: 3.00     Pack years: 1.50     Types: Cigarettes     Quit date: 1959     Years since quittin.0    Smokeless tobacco: Never   Vaping Use    Vaping Use: Never used   Substance Use Topics    Alcohol use: Yes     Comment: Occasionally    Drug use: No     Family History   Problem Relation Age of Onset    Cancer Mother         breast     Cancer Sister         breast     Cancer Sister         breast     Cancer Sister         breast            ROS    all review of system completed and negative except for those listed above     Objective:     /80 (BP Location: Right arm, Patient Position: Sitting, BP Cuff Size: Adult)   Pulse 70   Temp 36.3 °C (97.3 °F) (Temporal)   Resp 16   Ht 1.727 m (5' 8\")   Wt 69 kg (152 lb 1.9 oz)   SpO2 97%  Body mass index is 23.13 kg/m².  Physical Exam:      GEN: comfortable, alert and oriented, well nourished, well developed, in no apparent distress   HEENT: NCAT, eyes: pupils equal and reactive, sclera white, EOMIT, good dentition  HEART: limbs warm and well perfused, regular rate, no JVD, no lower extremity edema  LUNGS: speaking in full sentences, not in apparent respiratory distress, no audible wheezes  MSK: normal tone and bulk, no swelling of the joints, gait steady and normal           Assessment and Plan:   The following treatment plan was discussed        Problem List Items Addressed This Visit       Diabetes mellitus (HCC)     Lab Results   Component Value Date/Time    HBA1C 7 (A) 10/06/2022 12:00 AM      Reminder to schedule regular visits with me and DM RN     Requesting letter so that she does not have to bring her garbage to curb on snowy days   Extra time spent on letter     30+ min spent                   Instructed to follow up if symptoms worsen or fail to improve, ER/UC precautions discussed as well    Heidi Medrano MD  [facility]   [address] [place] [address] [address]   [place] [address]  Phone: [phone]

## 2023-01-13 NOTE — LETTER
To Whom it May concern =   Ailyn Quentin is a well established patient of mine.   It would be reasonable for her to not bring the garbage to the curb especially when snow outside so that she does not slip     Heidi Medrano M.D.

## 2023-01-13 NOTE — ASSESSMENT & PLAN NOTE
Lab Results   Component Value Date/Time    HBA1C 7 (A) 10/06/2022 12:00 AM      Reminder to schedule regular visits with me and DM RN     Requesting letter so that she does not have to bring her garbage to curb on snowy days   Extra time spent on letter     30+ min spent

## 2023-03-20 ENCOUNTER — OFFICE VISIT (OUTPATIENT)
Dept: MEDICAL GROUP | Facility: MEDICAL CENTER | Age: 83
End: 2023-03-20
Payer: MEDICARE

## 2023-03-20 VITALS
BODY MASS INDEX: 22.04 KG/M2 | SYSTOLIC BLOOD PRESSURE: 170 MMHG | TEMPERATURE: 97.2 F | WEIGHT: 145.39 LBS | DIASTOLIC BLOOD PRESSURE: 58 MMHG | OXYGEN SATURATION: 95 % | HEIGHT: 68 IN | HEART RATE: 68 BPM | RESPIRATION RATE: 16 BRPM

## 2023-03-20 DIAGNOSIS — H61.21 IMPACTED CERUMEN OF RIGHT EAR: ICD-10-CM

## 2023-03-20 DIAGNOSIS — I10 ESSENTIAL HYPERTENSION: ICD-10-CM

## 2023-03-20 DIAGNOSIS — E11.69 TYPE 2 DIABETES MELLITUS WITH OTHER SPECIFIED COMPLICATION, WITH LONG-TERM CURRENT USE OF INSULIN (HCC): ICD-10-CM

## 2023-03-20 DIAGNOSIS — Z79.4 TYPE 2 DIABETES MELLITUS WITH OTHER SPECIFIED COMPLICATION, WITH LONG-TERM CURRENT USE OF INSULIN (HCC): ICD-10-CM

## 2023-03-20 DIAGNOSIS — H90.0 CONDUCTIVE HEARING LOSS, BILATERAL: ICD-10-CM

## 2023-03-20 PROBLEM — E11.9 DIABETES MELLITUS (HCC): Chronic | Status: ACTIVE | Noted: 2021-08-26

## 2023-03-20 PROCEDURE — 99214 OFFICE O/P EST MOD 30 MIN: CPT | Performed by: BEHAVIOR ANALYST

## 2023-03-20 RX ORDER — LOSARTAN POTASSIUM 25 MG/1
25 TABLET ORAL DAILY
Qty: 30 TABLET | Refills: 2 | Status: SHIPPED | OUTPATIENT
Start: 2023-03-20 | End: 2023-03-22 | Stop reason: SDUPTHER

## 2023-03-20 ASSESSMENT — FIBROSIS 4 INDEX: FIB4 SCORE: 1.16

## 2023-03-20 NOTE — PATIENT INSTRUCTIONS
"Start losartan once daily for blood pressure. Please obtain an Omron blood pressure cuff and check readings once a day. Please keep a log of readings.     Hypertension, Adult  High blood pressure (hypertension) is when the force of blood pumping through the arteries is too strong. The arteries are the blood vessels that carry blood from the heart throughout the body. Hypertension forces the heart to work harder to pump blood and may cause arteries to become narrow or stiff. Untreated or uncontrolled hypertension can cause a heart attack, heart failure, a stroke, kidney disease, and other problems.  A blood pressure reading consists of a higher number over a lower number. Ideally, your blood pressure should be below 120/80. The first (\"top\") number is called the systolic pressure. It is a measure of the pressure in your arteries as your heart beats. The second (\"bottom\") number is called the diastolic pressure. It is a measure of the pressure in your arteries as the heart relaxes.  What are the causes?  The exact cause of this condition is not known. There are some conditions that result in or are related to high blood pressure.  What increases the risk?  Some risk factors for high blood pressure are under your control. The following factors may make you more likely to develop this condition:  Smoking.  Having type 2 diabetes mellitus, high cholesterol, or both.  Not getting enough exercise or physical activity.  Being overweight.  Having too much fat, sugar, calories, or salt (sodium) in your diet.  Drinking too much alcohol.  Some risk factors for high blood pressure may be difficult or impossible to change. Some of these factors include:  Having chronic kidney disease.  Having a family history of high blood pressure.  Age. Risk increases with age.  Race. You may be at higher risk if you are .  Gender. Men are at higher risk than women before age 45. After age 65, women are at higher risk than " men.  Having obstructive sleep apnea.  Stress.  What are the signs or symptoms?  High blood pressure may not cause symptoms. Very high blood pressure (hypertensive crisis) may cause:  Headache.  Anxiety.  Shortness of breath.  Nosebleed.  Nausea and vomiting.  Vision changes.  Severe chest pain.  Seizures.  How is this diagnosed?  This condition is diagnosed by measuring your blood pressure while you are seated, with your arm resting on a flat surface, your legs uncrossed, and your feet flat on the floor. The cuff of the blood pressure monitor will be placed directly against the skin of your upper arm at the level of your heart. It should be measured at least twice using the same arm. Certain conditions can cause a difference in blood pressure between your right and left arms.  Certain factors can cause blood pressure readings to be lower or higher than normal for a short period of time:  When your blood pressure is higher when you are in a health care provider's office than when you are at home, this is called white coat hypertension. Most people with this condition do not need medicines.  When your blood pressure is higher at home than when you are in a health care provider's office, this is called masked hypertension. Most people with this condition may need medicines to control blood pressure.  If you have a high blood pressure reading during one visit or you have normal blood pressure with other risk factors, you may be asked to:  Return on a different day to have your blood pressure checked again.  Monitor your blood pressure at home for 1 week or longer.  If you are diagnosed with hypertension, you may have other blood or imaging tests to help your health care provider understand your overall risk for other conditions.  How is this treated?  This condition is treated by making healthy lifestyle changes, such as eating healthy foods, exercising more, and reducing your alcohol intake. Your health care provider  may prescribe medicine if lifestyle changes are not enough to get your blood pressure under control, and if:  Your systolic blood pressure is above 130.  Your diastolic blood pressure is above 80.  Your personal target blood pressure may vary depending on your medical conditions, your age, and other factors.  Follow these instructions at home:  Eating and drinking    Eat a diet that is high in fiber and potassium, and low in sodium, added sugar, and fat. An example eating plan is called the DASH (Dietary Approaches to Stop Hypertension) diet. To eat this way:  Eat plenty of fresh fruits and vegetables. Try to fill one half of your plate at each meal with fruits and vegetables.  Eat whole grains, such as whole-wheat pasta, brown rice, or whole-grain bread. Fill about one fourth of your plate with whole grains.  Eat or drink low-fat dairy products, such as skim milk or low-fat yogurt.  Avoid fatty cuts of meat, processed or cured meats, and poultry with skin. Fill about one fourth of your plate with lean proteins, such as fish, chicken without skin, beans, eggs, or tofu.  Avoid pre-made and processed foods. These tend to be higher in sodium, added sugar, and fat.  Reduce your daily sodium intake. Most people with hypertension should eat less than 1,500 mg of sodium a day.  Do not drink alcohol if:  Your health care provider tells you not to drink.  You are pregnant, may be pregnant, or are planning to become pregnant.  If you drink alcohol:  Limit how much you use to:  0-1 drink a day for women.  0-2 drinks a day for men.  Be aware of how much alcohol is in your drink. In the [place] one drink equals one 12 oz bottle of beer (355 mL), one 5 oz glass of wine (148 mL), or one 1½ oz glass of hard liquor (44 mL).  Lifestyle    Work with your health care provider to maintain a healthy body weight or to lose weight. Ask what an ideal weight is for you.  Get at least 30 minutes of exercise most days of the week. Activities may  include walking, swimming, or biking.  Include exercise to strengthen your muscles (resistance exercise), such as Pilates or lifting weights, as part of your weekly exercise routine. Try to do these types of exercises for 30 minutes at least 3 days a week.  Do not use any products that contain nicotine or tobacco, such as cigarettes, e-cigarettes, and chewing tobacco. If you need help quitting, ask your health care provider.  Monitor your blood pressure at home as told by your health care provider.  Keep all follow-up visits as told by your health care provider. This is important.  Medicines  Take over-the-counter and prescription medicines only as told by your health care provider. Follow directions carefully. Blood pressure medicines must be taken as prescribed.  Do not skip doses of blood pressure medicine. Doing this puts you at risk for problems and can make the medicine less effective.  Ask your health care provider about side effects or reactions to medicines that you should watch for.  Contact a health care provider if you:  Think you are having a reaction to a medicine you are taking.  Have headaches that keep coming back (recurring).  Feel dizzy.  Have swelling in your ankles.  Have trouble with your vision.  Get help right away if you:  Develop a severe headache or confusion.  Have unusual weakness or numbness.  Feel faint.  Have severe pain in your chest or abdomen.  Vomit repeatedly.  Have trouble breathing.  Summary  Hypertension is when the force of blood pumping through your arteries is too strong. If this condition is not controlled, it may put you at risk for serious complications.  Your personal target blood pressure may vary depending on your medical conditions, your age, and other factors. For most people, a normal blood pressure is less than 120/80.  Hypertension is treated with lifestyle changes, medicines, or a combination of both. Lifestyle changes include losing weight, eating a healthy,  low-sodium diet, exercising more, and limiting alcohol.  This information is not intended to replace advice given to you by your health care provider. Make sure you discuss any questions you have with your health care provider.  Document Released: 12/18/2006 Document Revised: 08/28/2019 Document Reviewed: 08/28/2019  Elsevier Patient Education © 2020 [employer]

## 2023-03-20 NOTE — PROGRESS NOTES
"Subjective:     CC:    Chief Complaint   Patient presents with    Ear Fullness     X few months both ears      PCP: Dr. Sinai Medrano    HISTORY OF THE PRESENT ILLNESS:   Ailyn presents today with    Problem   Impacted Cerumen of Right Ear    She has been unable to hear out of her right ear very well recently.  She is hoping to get earwax removed today.     Conductive Hearing Loss, Bilateral   Essential Hypertension    Blood pressure high in office today and 6 months ago in office. Denies headaches, palpitations or racing heart beat, no chest pain or shortness of breath, denies lightheadedness or dizziness. .   Feels a little uncomfortable from being constipated.    She admits to eating salty foods lately.  She has not been exercising as regularly due to the cold weather.  Does not have a blood pressure cuff at home.        Diabetes Mellitus (Hcc)       Current Outpatient Medications   Medication Sig    losartan (COZAAR) 25 MG Tab Take 1 Tablet by mouth every day.    metFORMIN ER (GLUCOPHAGE XR) 500 MG TABLET SR 24 HR TAKE 4 TABLETS BY MOUTH ONCE DAILY (Patient taking differently: Taking 500 mg tid)    glucose blood (CONTOUR NEXT TEST) strip Testing blood sugars one time per day.  E11.65    Lancets Use one lancet to Test blood sugar three times daily before meals. (Patient taking differently: Use one lancet to Test blood sugar three times daily before meals.)    aspirin EC (ECOTRIN) 81 MG Tablet Delayed Response Take 162 mg by mouth one time. 2 tabs = 162 mg    Alcohol Swabs Wipe site with prep pad prior to injection. (Patient not taking: Reported on 3/24/2022)        ROS: See HPI        Objective:     Exam: BP (!) 170/58 (BP Location: Right arm, Patient Position: Sitting, BP Cuff Size: Adult long)   Pulse 68   Temp 36.2 °C (97.2 °F) (Temporal)   Resp 16   Ht 1.727 m (5' 8\")   Wt 65.9 kg (145 lb 6.3 oz)   SpO2 95%   BMI 22.11 kg/m²   Body mass index is 22.11 kg/m².    Physical Exam  Constitutional:       " Appearance: Normal appearance.   HENT:      Right Ear: There is impacted cerumen.      Left Ear: There is no impacted cerumen.   Cardiovascular:      Rate and Rhythm: Normal rate and regular rhythm.      Pulses: Normal pulses.      Heart sounds: Normal heart sounds.   Pulmonary:      Effort: Pulmonary effort is normal.      Breath sounds: Normal breath sounds.   Musculoskeletal:      Cervical back: Normal range of motion and neck supple.   Neurological:      Mental Status: She is alert.              Assessment & Plan:     82 y.o. female with the following -     1. Essential hypertension  -New problem, however has been elevated previous office visits.  Considering blood pressure is 170 systolic today (checked twice during visit) I do feel that it is prudent to start on blood pressure lowering medication.  Educated patient regarding the harms of untreated hypertension.  We will start low-dose losartan once daily.  Obtain labs in 1 to 2 weeks.  Counseled patient on goal blood pressure readings.  -  Obtain an Omron blood pressure cuff and check readings once a day. Please keep a log of readings.   -Counseled on lifestyle modifications for high blood pressure.  - losartan (COZAAR) 25 MG Tab; Take 1 Tablet by mouth every day.  Dispense: 30 Tablet; Refill: 2  - Basic Metabolic Panel; Future  - ESTIMATED GFR; Future    2. Impacted cerumen of right ear  -Ear lavage performed today and was successful.    3. Conductive hearing loss, bilateral  -Hearing deficit is still significant after removal of earwax.  Patient desire referral to audiology for consultation for hearing aids.  - Referral to Audiology    4. Type 2 diabetes mellitus with other specified complication, with long-term current use of insulin (HCC)  -See HCC Form      HCC Gap Form    Diagnosis: E11.69 - Type 2 diabetes mellitus with other specified complication (HCC)  Assessment and plan: Chronic, stable. Continue with current defined treatment plan: Metformin 2000  mg daily. Follow-up at least annually.  Last edited 03/20/23 14:56 PDT by KAY Herman.         Return in about 2 weeks (around 4/3/2023) for Hypertension.    Please note that this dictation was created using voice recognition software. I have made every reasonable attempt to correct obvious errors, but I expect that there are errors of grammar and possibly content that I did not discover before finalizing the note.

## 2023-03-22 DIAGNOSIS — I10 ESSENTIAL HYPERTENSION: ICD-10-CM

## 2023-03-22 NOTE — TELEPHONE ENCOUNTER
Was the patient seen in the last year in this department? Yes   Does patient have an active prescription for medications requested? No   Received Request Via: Pharmacy  Ins requires a 100 day supply

## 2023-03-23 ENCOUNTER — OFFICE VISIT (OUTPATIENT)
Dept: MEDICAL GROUP | Facility: MEDICAL CENTER | Age: 83
End: 2023-03-23
Payer: MEDICARE

## 2023-03-23 VITALS
HEART RATE: 71 BPM | SYSTOLIC BLOOD PRESSURE: 174 MMHG | HEIGHT: 68 IN | DIASTOLIC BLOOD PRESSURE: 84 MMHG | RESPIRATION RATE: 20 BRPM | OXYGEN SATURATION: 97 % | TEMPERATURE: 97.4 F | BODY MASS INDEX: 21.87 KG/M2 | WEIGHT: 144.29 LBS

## 2023-03-23 DIAGNOSIS — R73.09 ELEVATED HEMOGLOBIN A1C: ICD-10-CM

## 2023-03-23 DIAGNOSIS — R73.09 ELEVATED GLUCOSE: ICD-10-CM

## 2023-03-23 DIAGNOSIS — Z63.79 STRESS DUE TO ILLNESS OF FAMILY MEMBER: ICD-10-CM

## 2023-03-23 DIAGNOSIS — Z13.6 SCREENING FOR ISCHEMIC HEART DISEASE: ICD-10-CM

## 2023-03-23 DIAGNOSIS — Z13.1 SCREENING FOR DIABETES MELLITUS (DM): ICD-10-CM

## 2023-03-23 DIAGNOSIS — I10 PRIMARY HYPERTENSION: ICD-10-CM

## 2023-03-23 PROCEDURE — 99214 OFFICE O/P EST MOD 30 MIN: CPT | Performed by: FAMILY MEDICINE

## 2023-03-23 RX ORDER — LOSARTAN POTASSIUM 25 MG/1
25 TABLET ORAL DAILY
Qty: 100 TABLET | Refills: 2 | Status: SHIPPED | OUTPATIENT
Start: 2023-03-23 | End: 2023-05-23 | Stop reason: SDUPTHER

## 2023-03-23 RX ORDER — LOSARTAN POTASSIUM 50 MG/1
50 TABLET ORAL DAILY
Qty: 90 TABLET | Refills: 3 | Status: SHIPPED | OUTPATIENT
Start: 2023-03-23 | End: 2023-08-01

## 2023-03-23 ASSESSMENT — PATIENT HEALTH QUESTIONNAIRE - PHQ9: CLINICAL INTERPRETATION OF PHQ2 SCORE: 0

## 2023-03-23 ASSESSMENT — FIBROSIS 4 INDEX: FIB4 SCORE: 1.16

## 2023-03-28 PROBLEM — I10 PRIMARY HYPERTENSION: Status: ACTIVE | Noted: 2023-03-28

## 2023-03-28 NOTE — PROGRESS NOTES
This medical record contains text that has been entered with the assistance of computer voice recognition and dictation software.  Therefore, it may contain unintended errors in text, spelling, punctuation, or grammar        Chief Complaint   Patient presents with    Hypertension     Earlier today pt got a reading for her BP: 217/76    Back Pain     Mid upper back pain x 1 day       Ailyn Saavedra is a 82 y.o. female here evaluation and management of:       Current Outpatient Medications   Medication Sig Dispense Refill    losartan (COZAAR) 25 MG Tab Take 1 Tablet by mouth every day. 100 Tablet 2    losartan (COZAAR) 50 MG Tab Take 1 Tablet by mouth every day. 90 Tablet 3    metFORMIN ER (GLUCOPHAGE XR) 500 MG TABLET SR 24 HR TAKE 4 TABLETS BY MOUTH ONCE DAILY (Patient taking differently: 500 mg in the morning, at noon, and at bedtime.) 400 Tablet 3    glucose blood (CONTOUR NEXT TEST) strip Testing blood sugars one time per day.  E11.65 100 Strip 1    Lancets Use one lancet to Test blood sugar three times daily before meals. (Patient taking differently: Use one lancet to Test blood sugar three times daily before meals.) 100 Each 0    Alcohol Swabs Wipe site with prep pad prior to injection. 100 Each 0    aspirin EC (ECOTRIN) 81 MG Tablet Delayed Response Take 162 mg by mouth one time. 2 tabs = 162 mg       No current facility-administered medications for this visit.     Patient Active Problem List    Diagnosis Date Noted    Primary hypertension 03/28/2023    Impacted cerumen of right ear 03/20/2023    Conductive hearing loss, bilateral 03/20/2023    Essential hypertension 10/18/2022    Encounter for weight loss counseling 04/26/2022    Injury of right wrist 04/26/2022    Muscle cramping 03/24/2022    Stress due to illness of family member 08/26/2021    History of breast cancer 09/12/2019    Broken heart syndrome 01/20/2015    Cancer of skin of eyelid 04/30/2014     Past Surgical History:   Procedure Laterality  "Date    HYSTEROSCOPY WITH VIDEO OPERATIVE  4/3/2013    Performed by Demetrio Webb M.D. at SURGERY SAME DAY [facility] ORS    DILATION AND CURETTAGE  4/3/2013    Performed by Demetrio Webb M.D. at SURGERY SAME DAY [facility] ORS    LUMPECTOMY      OTHER      cataracts    SC RADIATION THERAPY PLAN SIMPLE        Social History     Tobacco Use    Smoking status: Former     Packs/day: 0.50     Years: 3.00     Pack years: 1.50     Types: Cigarettes     Quit date: 1959     Years since quittin.2    Smokeless tobacco: Never   Vaping Use    Vaping Use: Never used   Substance Use Topics    Alcohol use: Yes     Comment: Occasionally    Drug use: No     Family History   Problem Relation Age of Onset    Cancer Mother         breast     Cancer Sister         breast     Cancer Sister         breast     Cancer Sister         breast            ROS    all review of system completed and negative except for those listed above     Objective:     BP (!) 174/84 (BP Location: Right arm, Patient Position: Sitting, BP Cuff Size: Adult long)   Pulse 71   Temp 36.3 °C (97.4 °F) (Temporal)   Resp 20   Ht 1.727 m (5' 8\")   Wt 65.5 kg (144 lb 4.7 oz)   SpO2 97%  Body mass index is 21.94 kg/m².  Physical Exam:    Constitutional: Alert, no distress.  Skin: Warm, dry, good turgor, no rashes in visible areas.  Eye: Equal, round and reactive, conjunctiva clear, lids normal.  ENMT: Lips without lesions, good dentition, oropharynx clear.  Neck: Trachea midline, no masses, no thyromegaly. No cervical or supraclavicular lymphadenopathy.  Respiratory: Unlabored respiratory effort, lungs clear to auscultation, no wheezes, no ronchi.  Cardiovascular: Normal S1, S2, no murmur, no edema.  Abdomen: Soft, non-tender, no masses, no hepatosplenomegaly.  Psych: Alert and oriented x3, normal affect and mood.          Assessment and Plan:   The following treatment plan was discussed        Problem List Items Addressed This Visit       Stress due " to illness of family member     Daughter with terminal breast cancer              Primary hypertension     She is totally asymptomatic   So we will utilize medication to correct this slowly   See mar   Follow up 1 mo labs piror     30+ min spent     Strict er precautions reviewed                Relevant Medications    losartan (COZAAR) 50 MG Tab    Other Relevant Orders    Basic Metabolic Panel    Lipid Profile    MICROALBUMIN CREAT RATIO URINE     Other Visit Diagnoses       Elevated hemoglobin A1c        Relevant Orders    HEMOGLOBIN A1C    Screening for diabetes mellitus (DM)        Relevant Orders    Basic Metabolic Panel    Lipid Profile    MICROALBUMIN CREAT RATIO URINE    HEMOGLOBIN A1C    Screening for ischemic heart disease        Relevant Orders    Basic Metabolic Panel    Lipid Profile    MICROALBUMIN CREAT RATIO URINE    HEMOGLOBIN A1C    Elevated glucose        Relevant Orders    Basic Metabolic Panel    Lipid Profile    MICROALBUMIN CREAT RATIO URINE    HEMOGLOBIN A1C                  Instructed to follow up if symptoms worsen or fail to improve, ER/UC precautions discussed as well    Heidi Medrano MD  [facility], Family Medicine   [address] [place] [address]   [place] [address]  Phone: [phone]

## 2023-03-28 NOTE — ASSESSMENT & PLAN NOTE
She is totally asymptomatic   So we will utilize medication to correct this slowly   See mar   Follow up 1 mo labs piror     30+ min spent     Strict er precautions reviewed

## 2023-04-25 ENCOUNTER — OFFICE VISIT (OUTPATIENT)
Dept: MEDICAL GROUP | Facility: MEDICAL CENTER | Age: 83
End: 2023-04-25
Payer: MEDICARE

## 2023-04-25 VITALS
DIASTOLIC BLOOD PRESSURE: 76 MMHG | TEMPERATURE: 97.1 F | HEART RATE: 60 BPM | SYSTOLIC BLOOD PRESSURE: 124 MMHG | BODY MASS INDEX: 21.37 KG/M2 | HEIGHT: 68 IN | WEIGHT: 141 LBS | RESPIRATION RATE: 18 BRPM | OXYGEN SATURATION: 97 %

## 2023-04-25 DIAGNOSIS — E11.9 TYPE 2 DIABETES MELLITUS WITHOUT COMPLICATION, UNSPECIFIED WHETHER LONG TERM INSULIN USE (HCC): ICD-10-CM

## 2023-04-25 DIAGNOSIS — M54.50 LOW BACK PAIN, UNSPECIFIED BACK PAIN LATERALITY, UNSPECIFIED CHRONICITY, UNSPECIFIED WHETHER SCIATICA PRESENT: ICD-10-CM

## 2023-04-25 DIAGNOSIS — M54.6 ACUTE MIDLINE THORACIC BACK PAIN: ICD-10-CM

## 2023-04-25 DIAGNOSIS — H90.0 CONDUCTIVE HEARING LOSS, BILATERAL: ICD-10-CM

## 2023-04-25 DIAGNOSIS — I10 ESSENTIAL HYPERTENSION: Chronic | ICD-10-CM

## 2023-04-25 PROCEDURE — 99214 OFFICE O/P EST MOD 30 MIN: CPT | Performed by: FAMILY MEDICINE

## 2023-04-25 ASSESSMENT — FIBROSIS 4 INDEX: FIB4 SCORE: 1.18

## 2023-04-25 NOTE — PROGRESS NOTES
This medical record contains text that has been entered with the assistance of computer voice recognition and dictation software.  Therefore, it may contain unintended errors in text, spelling, punctuation, or grammar        Chief Complaint   Patient presents with    Back Pain     Back pain from middle back radiating to hip, mostly on the left side and getting worse       Ailyn Saavedra is a 83 y.o. female here evaluation and management of:         Current Outpatient Medications   Medication Sig Dispense Refill    losartan (COZAAR) 50 MG Tab Take 1 Tablet by mouth every day. 90 Tablet 3    metFORMIN ER (GLUCOPHAGE XR) 500 MG TABLET SR 24 HR TAKE 4 TABLETS BY MOUTH ONCE DAILY (Patient taking differently: 500 mg in the morning, at noon, and at bedtime.) 400 Tablet 3    glucose blood (CONTOUR NEXT TEST) strip Testing blood sugars one time per day.  E11.65 100 Strip 1    Lancets Use one lancet to Test blood sugar three times daily before meals. (Patient taking differently: Use one lancet to Test blood sugar three times daily before meals.) 100 Each 0    Alcohol Swabs Wipe site with prep pad prior to injection. 100 Each 0    aspirin EC (ECOTRIN) 81 MG Tablet Delayed Response Take 162 mg by mouth one time. 2 tabs = 162 mg      losartan (COZAAR) 25 MG Tab Take 1 Tablet by mouth every day. 100 Tablet 2     No current facility-administered medications for this visit.     Patient Active Problem List    Diagnosis Date Noted    Type 2 diabetes mellitus without complication (HCC) 04/25/2023    Acute midline thoracic back pain 04/25/2023    Low back pain 04/25/2023    Primary hypertension 03/28/2023    Impacted cerumen of right ear 03/20/2023    Conductive hearing loss, bilateral 03/20/2023    Essential hypertension 10/18/2022    Encounter for weight loss counseling 04/26/2022    Injury of right wrist 04/26/2022    Muscle cramping 03/24/2022    Stress due to illness of family member 08/26/2021    History of breast cancer  "2019    Broken heart syndrome 2015    Cancer of skin of eyelid 2014     Past Surgical History:   Procedure Laterality Date    HYSTEROSCOPY WITH VIDEO OPERATIVE  4/3/2013    Performed by Demetrio Webb M.D. at SURGERY SAME DAY [facility]    DILATION AND CURETTAGE  4/3/2013    Performed by Demetrio Webb M.D. at SURGERY SAME DAY [facility]    LUMPECTOMY      OTHER      cataracts    NC RADIATION THERAPY PLAN SIMPLE        Social History     Tobacco Use    Smoking status: Former     Packs/day: 0.50     Years: 3.00     Pack years: 1.50     Types: Cigarettes     Quit date: 1959     Years since quittin.3    Smokeless tobacco: Never   Vaping Use    Vaping Use: Never used   Substance Use Topics    Alcohol use: Yes     Comment: Occasionally    Drug use: No     Family History   Problem Relation Age of Onset    Cancer Mother         breast     Cancer Sister         breast     Cancer Sister         breast     Cancer Sister         breast            ROS    all review of system completed and negative except for those listed above     Objective:     /76 (BP Location: Left arm, Patient Position: Sitting, BP Cuff Size: Small adult)   Pulse 60   Temp 36.2 °C (97.1 °F) (Temporal)   Resp 18   Ht 1.727 m (5' 8\")   Wt 64 kg (141 lb)   SpO2 97%  Body mass index is 21.44 kg/m².  Physical Exam:    Constitutional: Alert, no distress.  Skin: Warm, dry, good turgor, no rashes in visible areas.  Eye: Equal, round and reactive, conjunctiva clear, lids normal.  ENMT: Lips without lesions, good dentition, oropharynx clear.  Neck: Trachea midline, no masses, no thyromegaly. No cervical or supraclavicular lymphadenopathy.  Respiratory: Unlabored respiratory effort, lungs clear to auscultation, no wheezes, no ronchi.  Cardiovascular: Normal S1, S2, no murmur, no edema.  Abdomen: Soft, non-tender, no masses, no hepatosplenomegaly.  Psych: Alert and oriented x3, normal affect and " mood.          Assessment and Plan:   The following treatment plan was discussed        Problem List Items Addressed This Visit       Essential hypertension (Chronic)     At goal now  Reminder to do labs         Conductive hearing loss, bilateral     Got hearing aids          Type 2 diabetes mellitus without complication (HCC)     Reminder to do labs and eye exam          Relevant Orders    Referral to Optometry    Acute midline thoracic back pain     Middle back pain x 2 weeks   At some point she had radiation to her L hip but that improved   Taking tylenol or NSAID inconsistently    I susupect muscle spasm as this was precipiated by doing a lot of housework which involved a lot of bending       Heat   Compression release with tennis ball   High dose NSAIDs x 2 weeks   X rays   And follow up   If not improved I may involve physiatry     30+ min spent            Relevant Orders    DX-THORACIC SPINE-2 VIEWS    DX-LUMBAR SPINE-2 OR 3 VIEWS    Low back pain    Relevant Orders    DX-LUMBAR SPINE-2 OR 3 VIEWS   Of note no weakness on exam   Gait steady and normal           Instructed to follow up if symptoms worsen or fail to improve, ER/UC precautions discussed as well    Heidi Medrano MD  [facility], Family Medicine   [address] [place] [address]   [place] [address]  Phone: [phone]

## 2023-04-25 NOTE — ASSESSMENT & PLAN NOTE
Middle back pain x 2 weeks   At some point she had radiation to her L hip but that improved   Taking tylenol or NSAID inconsistently    I susupect muscle spasm as this was precipiated by doing a lot of housework which involved a lot of bending       Heat   Compression release with tennis ball   High dose NSAIDs x 2 weeks   X rays   And follow up   If not improved I may involve physiatry     30+ min spent

## 2023-04-27 ENCOUNTER — HOSPITAL ENCOUNTER (OUTPATIENT)
Dept: RADIOLOGY | Facility: MEDICAL CENTER | Age: 83
End: 2023-04-27
Attending: FAMILY MEDICINE
Payer: MEDICARE

## 2023-04-27 DIAGNOSIS — M54.6 ACUTE MIDLINE THORACIC BACK PAIN: ICD-10-CM

## 2023-04-27 DIAGNOSIS — M54.50 LOW BACK PAIN, UNSPECIFIED BACK PAIN LATERALITY, UNSPECIFIED CHRONICITY, UNSPECIFIED WHETHER SCIATICA PRESENT: ICD-10-CM

## 2023-04-27 PROCEDURE — 72070 X-RAY EXAM THORAC SPINE 2VWS: CPT

## 2023-04-27 PROCEDURE — 72100 X-RAY EXAM L-S SPINE 2/3 VWS: CPT

## 2023-05-18 PROBLEM — N18.31 CHRONIC KIDNEY DISEASE, STAGE 3A: Status: ACTIVE | Noted: 2023-05-18

## 2023-05-23 ENCOUNTER — HOSPITAL ENCOUNTER (OUTPATIENT)
Facility: MEDICAL CENTER | Age: 83
End: 2023-05-23
Attending: FAMILY MEDICINE
Payer: MEDICARE

## 2023-05-23 ENCOUNTER — OFFICE VISIT (OUTPATIENT)
Dept: MEDICAL GROUP | Facility: MEDICAL CENTER | Age: 83
End: 2023-05-23
Payer: MEDICARE

## 2023-05-23 VITALS
HEIGHT: 69 IN | HEART RATE: 65 BPM | DIASTOLIC BLOOD PRESSURE: 70 MMHG | OXYGEN SATURATION: 97 % | WEIGHT: 142.42 LBS | BODY MASS INDEX: 21.09 KG/M2 | TEMPERATURE: 97.3 F | SYSTOLIC BLOOD PRESSURE: 174 MMHG

## 2023-05-23 DIAGNOSIS — Z13.1 SCREENING FOR DIABETES MELLITUS (DM): ICD-10-CM

## 2023-05-23 DIAGNOSIS — R73.09 ELEVATED GLUCOSE: ICD-10-CM

## 2023-05-23 DIAGNOSIS — I10 PRIMARY HYPERTENSION: ICD-10-CM

## 2023-05-23 DIAGNOSIS — E11.9 TYPE 2 DIABETES MELLITUS WITHOUT COMPLICATION (HCC): ICD-10-CM

## 2023-05-23 DIAGNOSIS — M54.16 LUMBAR RADICULOPATHY: ICD-10-CM

## 2023-05-23 DIAGNOSIS — M54.50 LOW BACK PAIN, UNSPECIFIED BACK PAIN LATERALITY, UNSPECIFIED CHRONICITY, UNSPECIFIED WHETHER SCIATICA PRESENT: ICD-10-CM

## 2023-05-23 DIAGNOSIS — Z13.6 SCREENING FOR ISCHEMIC HEART DISEASE: ICD-10-CM

## 2023-05-23 DIAGNOSIS — I10 ESSENTIAL HYPERTENSION: ICD-10-CM

## 2023-05-23 DIAGNOSIS — Z91.199 MEDICAL NON-COMPLIANCE: ICD-10-CM

## 2023-05-23 LAB
AMBIGUOUS DTTM AMBI4: NORMAL
CREAT UR-MCNC: 44.03 MG/DL
HBA1C MFR BLD: 6.9 % (ref ?–5.8)
MICROALBUMIN UR-MCNC: <1.2 MG/DL
MICROALBUMIN/CREAT UR: NORMAL MG/G (ref 0–30)
POCT INT CON NEG: NEGATIVE
POCT INT CON POS: POSITIVE

## 2023-05-23 PROCEDURE — 82570 ASSAY OF URINE CREATININE: CPT

## 2023-05-23 PROCEDURE — 3078F DIAST BP <80 MM HG: CPT | Performed by: FAMILY MEDICINE

## 2023-05-23 PROCEDURE — 83036 HEMOGLOBIN GLYCOSYLATED A1C: CPT | Performed by: FAMILY MEDICINE

## 2023-05-23 PROCEDURE — 99214 OFFICE O/P EST MOD 30 MIN: CPT | Performed by: FAMILY MEDICINE

## 2023-05-23 PROCEDURE — 82043 UR ALBUMIN QUANTITATIVE: CPT

## 2023-05-23 PROCEDURE — 3077F SYST BP >= 140 MM HG: CPT | Performed by: FAMILY MEDICINE

## 2023-05-23 RX ORDER — LOSARTAN POTASSIUM 50 MG/1
50 TABLET ORAL DAILY
Qty: 100 TABLET | Refills: 3 | Status: CANCELLED | OUTPATIENT
Start: 2023-05-23

## 2023-05-23 ASSESSMENT — FIBROSIS 4 INDEX: FIB4 SCORE: 1.18

## 2023-05-23 NOTE — TELEPHONE ENCOUNTER
Was the patient seen in the last year in this department? Yes   Does patient have an active prescription for medications requested? No   Received Request Via: Pharmacy  Dr. Medrano,  Please sign the rx strenght for cozar rx pt ought to take.

## 2023-05-23 NOTE — ASSESSMENT & PLAN NOTE
Lab Results   Component Value Date/Time    HBA1C 7 (A) 10/06/2022 12:00 AM      But we are having a hard time getting her to follow through with labs eye exam dm rn visits and our rn recently was pulled     Referral to endo   Continue monthly visits with me     30+ min spent

## 2023-05-23 NOTE — PROGRESS NOTES
This medical record contains text that has been entered with the assistance of computer voice recognition and dictation software.  Therefore, it may contain unintended errors in text, spelling, punctuation, or grammar        Chief Complaint   Patient presents with    Back Pain     Lt. Mid back localized pain x 2 mo; takes oc tylenol, has gotten a little better but the pain continues.  Had back x-rays  Pt unable to play tennis now.        Results       Ailyn Saavedra is a 83 y.o. female here evaluation and management of:       Current Outpatient Medications   Medication Sig Dispense Refill    metFORMIN ER (GLUCOPHAGE XR) 500 MG TABLET SR 24 HR TAKE 4 TABLETS BY MOUTH ONCE DAILY (Patient taking differently: 500 mg in the morning, at noon, and at bedtime.) 400 Tablet 3    glucose blood (CONTOUR NEXT TEST) strip Testing blood sugars one time per day.  E11.65 100 Strip 1    Lancets Use one lancet to Test blood sugar three times daily before meals. (Patient taking differently: Use one lancet to Test blood sugar three times daily before meals.) 100 Each 0    Alcohol Swabs Wipe site with prep pad prior to injection. 100 Each 0    losartan (COZAAR) 25 MG Tab Take 1 Tablet by mouth every day. (Patient not taking: Reported on 5/23/2023) 100 Tablet 2    losartan (COZAAR) 50 MG Tab Take 1 Tablet by mouth every day. (Patient not taking: Reported on 5/23/2023) 90 Tablet 3     No current facility-administered medications for this visit.     Patient Active Problem List    Diagnosis Date Noted    Chronic kidney disease, stage 3a (HCC) 05/18/2023    Type 2 diabetes mellitus without complication (HCC) 04/25/2023    Acute midline thoracic back pain 04/25/2023    Low back pain 04/25/2023    Primary hypertension 03/28/2023    Impacted cerumen of right ear 03/20/2023    Conductive hearing loss, bilateral 03/20/2023    Essential hypertension 10/18/2022    Encounter for weight loss counseling 04/26/2022    Injury of right wrist  "2022    Muscle cramping 2022    Stress due to illness of family member 2021    History of breast cancer 2019    Broken heart syndrome 2015    Cancer of skin of eyelid 2014     Past Surgical History:   Procedure Laterality Date    HYSTEROSCOPY WITH VIDEO OPERATIVE  4/3/2013    Performed by Demetrio Webb M.D. at SURGERY SAME DAY [facility]    DILATION AND CURETTAGE  4/3/2013    Performed by Demetrio Webb M.D. at SURGERY SAME DAY [facility]    LUMPECTOMY      OTHER      cataracts    [place] RADIATION THERAPY PLAN SIMPLE        Social History     Tobacco Use    Smoking status: Former     Packs/day: 0.50     Years: 3.00     Pack years: 1.50     Types: Cigarettes     Quit date: 1959     Years since quittin.4    Smokeless tobacco: Never   Vaping Use    Vaping Use: Never used   Substance Use Topics    Alcohol use: Yes     Comment: Occasionally    Drug use: No     Family History   Problem Relation Age of Onset    Cancer Mother         breast     Cancer Sister         breast     Cancer Sister         breast     Cancer Sister         breast            ROS    all review of system completed and negative except for those listed above     Objective:     BP (!) 174/70 (BP Location: Right arm, Patient Position: Sitting, BP Cuff Size: Adult long)   Pulse 65   Temp 36.3 °C (97.3 °F) (Temporal)   Ht 1.753 m (5' 9\")   Wt 64.6 kg (142 lb 6.7 oz)   SpO2 97%  Body mass index is 21.03 kg/m².  Physical Exam:      GEN: comfortable, alert and oriented, well nourished, well developed, in no apparent distress   HEENT: NCAT, eyes: pupils equal and reactive, sclera white, EOMIT, good dentition  HEART: limbs warm and well perfused, regular rate, no JVD, no lower extremity edema  LUNGS: speaking in full sentences, not in apparent respiratory distress, no audible wheezes  MSK: normal tone and bulk, no swelling of the joints, gait steady and normal           Assessment and Plan:   The " following treatment plan was discussed        Problem List Items Addressed This Visit       Primary hypertension     We are having trouble with medication compliance   Reminder to  and start ARB     Follow up 1 mo   Referral to cardiology [place]                Relevant Orders    MICROALBUMIN CREAT RATIO URINE    REFERRAL TO CARDIOLOGY    Type 2 diabetes mellitus without complication (HCC)     Lab Results   Component Value Date/Time    HBA1C 7 (A) 10/06/2022 12:00 AM      But we are having a hard time getting her to follow through with labs eye exam dm rn visits and our rn recently was pulled     Referral to endo   Continue monthly visits with me     30+ min spent          Relevant Orders    POCT A1C    Referral to Ophthalmology    MICROALBUMIN CREAT RATIO URINE    Referral to Endocrinology    Referral to Pain Clinic    Low back pain     Review x ray     Plan for physiatry consult   Benefits and expectations discussed                 Other Visit Diagnoses       Medical non-compliance        Relevant Orders    REFERRAL TO CARDIOLOGY    Lumbar radiculopathy        Relevant Orders    Referral to Pain Clinic                  Instructed to follow up if symptoms worsen or fail to improve, ER/UC precautions discussed as well    Heidi Medrano MD  [facility], Family Medicine   [address] [place] [address] Pkwy   [place] [address]  Phone: [phone]

## 2023-05-23 NOTE — ASSESSMENT & PLAN NOTE
We are having trouble with medication compliance   Reminder to  and start ARB     Follow up 1 mo   Referral to cardiology [place]

## 2023-05-25 RX ORDER — LOSARTAN POTASSIUM 25 MG/1
25 TABLET ORAL DAILY
Qty: 100 TABLET | Refills: 2 | Status: SHIPPED | OUTPATIENT
Start: 2023-05-25 | End: 2023-11-17 | Stop reason: SDUPTHER

## 2023-05-31 ENCOUNTER — TELEPHONE (OUTPATIENT)
Dept: HEALTH INFORMATION MANAGEMENT | Facility: OTHER | Age: 83
End: 2023-05-31
Payer: MEDICARE

## 2023-06-05 ENCOUNTER — TELEPHONE (OUTPATIENT)
Dept: MEDICAL GROUP | Facility: MEDICAL CENTER | Age: 83
End: 2023-06-05
Payer: MEDICARE

## 2023-06-05 NOTE — TELEPHONE ENCOUNTER
----- Message from Andressa San sent at 6/5/2023 11:00 AM PDT -----  Regarding: LOSARTAN  Contact: [phone]  Hello,    Patient wanted to notify that she has tried the losartan which was recently prescribed and had chest pain after taking it for several hours after. She gave no further details other than she stopped taking the medication and would like to know how to proceed. She does not have the chest pain anymore, and feels overall good.     Thank you,  Andressa

## 2023-06-05 NOTE — TELEPHONE ENCOUNTER
Phone Number Called: [phone] (home)     Call outcome:  LM for pt to call back in order to gather more information.

## 2023-06-06 NOTE — TELEPHONE ENCOUNTER
Per Dr. Medrano  Should we have triage RN call her about Chest pain? Chest pain usually needs to be evaluated in ER and is not a side effect of her medicine     -------------------------------------------------------------------------------  Phone Number Called: [phone] (home)     Call outcome:  LM for pt to call back reg a mess she left yesterday in order to gather more information. I also forwarded pt's mess to our [facility] RN pool..  Dr. Medrano, please note...

## 2023-06-08 NOTE — TELEPHONE ENCOUNTER
Per Fahad Medrano,    I was able to reach Ailyn today and she stated that she had one brief episode of chest pain after taking a dose of losartan. Subsequently she has had no other episodes and is not taking the losartan currently. I reminded her to go to ED immediately if chest pain recurs and she stated understanding. Further reminded her to keep upcoming appointment on 6/20 to discuss medication regimen.   Fahad Mehta RN

## 2023-06-15 ENCOUNTER — APPOINTMENT (RX ONLY)
Dept: URBAN - METROPOLITAN AREA CLINIC 4 | Facility: CLINIC | Age: 83
Setting detail: DERMATOLOGY
End: 2023-06-15

## 2023-06-15 ENCOUNTER — APPOINTMENT (OUTPATIENT)
Dept: MEDICAL GROUP | Facility: MEDICAL CENTER | Age: 83
End: 2023-06-15
Payer: MEDICARE

## 2023-06-15 DIAGNOSIS — L72.8 OTHER FOLLICULAR CYSTS OF THE SKIN AND SUBCUTANEOUS TISSUE: ICD-10-CM

## 2023-06-15 DIAGNOSIS — D22 MELANOCYTIC NEVI: ICD-10-CM

## 2023-06-15 DIAGNOSIS — L57.0 ACTINIC KERATOSIS: ICD-10-CM

## 2023-06-15 DIAGNOSIS — Z85.828 PERSONAL HISTORY OF OTHER MALIGNANT NEOPLASM OF SKIN: ICD-10-CM

## 2023-06-15 DIAGNOSIS — L82.1 OTHER SEBORRHEIC KERATOSIS: ICD-10-CM

## 2023-06-15 DIAGNOSIS — L81.4 OTHER MELANIN HYPERPIGMENTATION: ICD-10-CM

## 2023-06-15 DIAGNOSIS — D18.0 HEMANGIOMA: ICD-10-CM

## 2023-06-15 PROBLEM — D18.01 HEMANGIOMA OF SKIN AND SUBCUTANEOUS TISSUE: Status: ACTIVE | Noted: 2023-06-15

## 2023-06-15 PROBLEM — D22.5 MELANOCYTIC NEVI OF TRUNK: Status: ACTIVE | Noted: 2023-06-15

## 2023-06-15 PROBLEM — D48.5 NEOPLASM OF UNCERTAIN BEHAVIOR OF SKIN: Status: ACTIVE | Noted: 2023-06-15

## 2023-06-15 PROCEDURE — 17000 DESTRUCT PREMALG LESION: CPT

## 2023-06-15 PROCEDURE — ? COUNSELING

## 2023-06-15 PROCEDURE — ? OBSERVATION

## 2023-06-15 PROCEDURE — 99213 OFFICE O/P EST LOW 20 MIN: CPT | Mod: 25

## 2023-06-15 PROCEDURE — ? LIQUID NITROGEN

## 2023-06-15 PROCEDURE — ? DIAGNOSIS COMMENT

## 2023-06-15 ASSESSMENT — LOCATION SIMPLE DESCRIPTION DERM
LOCATION SIMPLE: LEFT UPPER BACK
LOCATION SIMPLE: RIGHT PRETIBIAL REGION
LOCATION SIMPLE: LEFT FOREHEAD
LOCATION SIMPLE: RIGHT AXILLARY VAULT
LOCATION SIMPLE: LEFT PRETIBIAL REGION
LOCATION SIMPLE: RIGHT UPPER BACK
LOCATION SIMPLE: RIGHT TEMPLE
LOCATION SIMPLE: NOSE
LOCATION SIMPLE: UPPER BACK
LOCATION SIMPLE: ABDOMEN

## 2023-06-15 ASSESSMENT — LOCATION ZONE DERM
LOCATION ZONE: FACE
LOCATION ZONE: TRUNK
LOCATION ZONE: NOSE
LOCATION ZONE: LEG
LOCATION ZONE: AXILLAE

## 2023-06-15 ASSESSMENT — LOCATION DETAILED DESCRIPTION DERM
LOCATION DETAILED: LEFT INFERIOR LATERAL FOREHEAD
LOCATION DETAILED: LEFT SUPERIOR MEDIAL UPPER BACK
LOCATION DETAILED: LEFT PROXIMAL PRETIBIAL REGION
LOCATION DETAILED: EPIGASTRIC SKIN
LOCATION DETAILED: PERIUMBILICAL SKIN
LOCATION DETAILED: RIGHT CENTRAL TEMPLE
LOCATION DETAILED: RIGHT AXILLARY VAULT
LOCATION DETAILED: RIGHT SUPERIOR MEDIAL UPPER BACK
LOCATION DETAILED: RIGHT PROXIMAL PRETIBIAL REGION
LOCATION DETAILED: INFERIOR THORACIC SPINE
LOCATION DETAILED: NASAL SUPRATIP

## 2023-06-15 NOTE — PROCEDURE: REASSURANCE
Detail Level: Detailed
Hide Include Location In Plan Question?: No
Detail Level: Generalized
Detail Level: Zone
Include Location In Plan?: Yes

## 2023-06-20 ENCOUNTER — APPOINTMENT (OUTPATIENT)
Dept: MEDICAL GROUP | Facility: MEDICAL CENTER | Age: 83
End: 2023-06-20
Payer: MEDICARE

## 2023-07-06 ENCOUNTER — OFFICE VISIT (OUTPATIENT)
Dept: CARDIOLOGY | Facility: MEDICAL CENTER | Age: 83
End: 2023-07-06
Attending: FAMILY MEDICINE
Payer: MEDICARE

## 2023-07-06 VITALS
SYSTOLIC BLOOD PRESSURE: 162 MMHG | WEIGHT: 140 LBS | BODY MASS INDEX: 20.73 KG/M2 | OXYGEN SATURATION: 97 % | HEART RATE: 67 BPM | HEIGHT: 69 IN | DIASTOLIC BLOOD PRESSURE: 84 MMHG | RESPIRATION RATE: 20 BRPM

## 2023-07-06 DIAGNOSIS — I10 ESSENTIAL HYPERTENSION: Primary | Chronic | ICD-10-CM

## 2023-07-06 LAB — EKG IMPRESSION: NORMAL

## 2023-07-06 PROCEDURE — 93010 ELECTROCARDIOGRAM REPORT: CPT | Performed by: INTERNAL MEDICINE

## 2023-07-06 PROCEDURE — 93005 ELECTROCARDIOGRAM TRACING: CPT | Performed by: INTERNAL MEDICINE

## 2023-07-06 PROCEDURE — 99212 OFFICE O/P EST SF 10 MIN: CPT | Performed by: INTERNAL MEDICINE

## 2023-07-06 PROCEDURE — 3079F DIAST BP 80-89 MM HG: CPT | Performed by: INTERNAL MEDICINE

## 2023-07-06 PROCEDURE — 3077F SYST BP >= 140 MM HG: CPT | Performed by: INTERNAL MEDICINE

## 2023-07-06 PROCEDURE — 99211 OFF/OP EST MAY X REQ PHY/QHP: CPT | Performed by: INTERNAL MEDICINE

## 2023-07-06 PROCEDURE — 99203 OFFICE O/P NEW LOW 30 MIN: CPT | Performed by: INTERNAL MEDICINE

## 2023-07-06 RX ORDER — OMEPRAZOLE 20 MG/1
CAPSULE, DELAYED RELEASE ORAL
COMMUNITY
End: 2023-07-06

## 2023-07-06 RX ORDER — ACETAMINOPHEN 325 MG/1
650 TABLET ORAL EVERY 4 HOURS PRN
COMMUNITY
End: 2023-09-19

## 2023-07-06 RX ORDER — HYDROCODONE BITARTRATE AND ACETAMINOPHEN 5; 325 MG/1; MG/1
TABLET ORAL
COMMUNITY
End: 2023-08-01

## 2023-07-06 ASSESSMENT — FIBROSIS 4 INDEX: FIB4 SCORE: 1.18

## 2023-07-06 NOTE — PROGRESS NOTES
Cardiology Initial Consultation Note    Date of note: 7/6/2023    Primary Care Provider: Heidi Medrano M.D.  Referring Provider: Heidi Medrano M.D.    Patient Name: Ailyn Saavedra  YOB: 1940  MRN:              [id number]    Chief Complaint   Patient presents with    HTN (Controlled)     History of Present Illness: Ms. Ailyn Saavedra is a 83 y.o. female whose current medical problems include hypertension, diabetes mellitus, history of breast cancer, who is here for cardiac consultation for hypertension and medical noncompliance.    The patient reports no symptoms.  She reports she used to be pretty big  and had no problems playing tennis.  She denies chest pain, no shortness of breath, no orthopnea, no PND, no palpitations, no lightheadedness, no dizziness, no syncope.  Her son recently had a heart attack and so is staying with her.    She does have a history of high blood pressure.  She reports the losartan caused her to have chest pain across her chest so she stopped it, but she only tried it for a few days.  She is willing to go back and try it.    Cardiovascular Risk Factors:  1. Smoking status:   Tobacco Use: Medium Risk (7/6/2023)    Patient History     Smoking Tobacco Use: Former     Smokeless Tobacco Use: Never     Passive Exposure: Not on file     2. Type II Diabetes Mellitus: Yes on metformin  Lab Results   Component Value Date/Time    HBA1C 6.9 (A) 05/23/2023 10:37 AM    HBA1C 7 (A) 10/06/2022 12:00 AM     3. Hypertension: Patient is not on any blood pressure medication  4. Dyslipidemia: Not on statins  Cholesterol,Tot   Date Value Ref Range Status   03/30/2022 207 (H) 100 - 199 mg/dL Final     LDL   Date Value Ref Range Status   03/30/2022 116 (H) <100 mg/dL Final     HDL   Date Value Ref Range Status   03/30/2022 72 >=40 mg/dL Final     Triglycerides   Date Value Ref Range Status   03/30/2022 95 0 - 149 mg/dL Final     5. Family history of early Coronary  Artery Disease in a first degree relative (Male less than 55 years of age; Female less than 65 years of age): No    Past Medical History:   Diagnosis Date    Breast cancer (HCC)     Breath shortness     with exercise    Cancer (HCC)     eye lid and breast     CATARACT     Other specified symptom associated with female genital organs        Past Surgical History:   Procedure Laterality Date    HYSTEROSCOPY WITH VIDEO OPERATIVE  4/3/2013    Performed by Demetrio Webb M.D. at SURGERY SAME DAY [facility] ORS    DILATION AND CURETTAGE  4/3/2013    Performed by Demetrio Webb M.D. at SURGERY SAME DAY [facility] ORS    LUMPECTOMY      OTHER      cataracts    NE RADIATION THERAPY PLAN SIMPLE         Current Outpatient Medications   Medication Sig Dispense Refill    acetaminophen (TYLENOL) 325 MG Tab Take 650 mg by mouth every four hours as needed.      metFORMIN ER (GLUCOPHAGE XR) 500 MG TABLET SR 24 HR TAKE 4 TABLETS BY MOUTH ONCE DAILY (Patient taking differently: 500 mg in the morning, at noon, and at bedtime.) 400 Tablet 3    HYDROcodone-acetaminophen (NORCO) 5-325 MG Tab per tablet Take 1 tablet(s) EVERY 4-6 HOURS by oral route. (Patient not taking: Reported on 7/6/2023)      losartan (COZAAR) 25 MG Tab Take 1 Tablet by mouth every day. (Patient not taking: Reported on 7/6/2023) 100 Tablet 2    losartan (COZAAR) 50 MG Tab Take 1 Tablet by mouth every day. (Patient not taking: Reported on 5/23/2023) 90 Tablet 3    glucose blood (CONTOUR NEXT TEST) strip Testing blood sugars one time per day.  E11.65 (Patient not taking: Reported on 7/6/2023) 100 Strip 1    Lancets Use one lancet to Test blood sugar three times daily before meals. (Patient not taking: Reported on 7/6/2023) 100 Each 0    Alcohol Swabs Wipe site with prep pad prior to injection. (Patient not taking: Reported on 7/6/2023) 100 Each 0     No current facility-administered medications for this visit.       No Known Allergies    Family History   Problem  "Relation Age of Onset    Cancer Mother         breast     Cancer Sister         breast     Cancer Sister         breast     Cancer Sister         breast        Social History     Socioeconomic History    Marital status:    Tobacco Use    Smoking status: Former     Packs/day: 0.50     Years: 3.00     Pack years: 1.50     Types: Cigarettes     Quit date: 1959     Years since quittin.5    Smokeless tobacco: Never   Vaping Use    Vaping Use: Never used   Substance and Sexual Activity    Alcohol use: Yes     Comment: Occasionally    Drug use: No    Sexual activity: Not Currently     Partners: Male        ROS    Physical Exam:  Ambulatory Vitals  BP (!) 162/84 (BP Location: Left arm, Patient Position: Sitting, BP Cuff Size: Adult)   Pulse 67   Resp 20   Ht 1.753 m (5' 9\")   Wt 63.5 kg (140 lb)   SpO2 97%    Oxygen Therapy:  Pulse Oximetry: 97 %  BP Readings from Last 4 Encounters:   23 (!) 162/84   23 (!) 174/70   23 124/76   23 (!) 174/84       Weight/BMI:   Body mass index is 20.67 kg/m².  Wt Readings from Last 2 Encounters:   23 63.5 kg (140 lb)   23 64.6 kg (142 lb 6.7 oz)       Physical Exam     Lab Data Review:  Lab Results   Component Value Date/Time    SODIUM 134 (L) 2022 07:45 AM    POTASSIUM 4.8 2022 07:45 AM    CHLORIDE 99 2022 07:45 AM    CO2 25 2022 07:45 AM    GLUCOSE 213 (H) 2022 07:45 AM    BUN 13 2022 07:45 AM    CREATININE 0.96 2022 07:45 AM     Lab Results   Component Value Date/Time    ALKPHOSPHAT 54 2022 07:45 AM    ASTSGOT 17 2022 07:45 AM    ALTSGPT 16 2022 07:45 AM    TBILIRUBIN 0.6 2022 07:45 AM      Lab Results   Component Value Date/Time    WBC 6.3 2022 07:45 AM    WBC 9.5 10/31/2010 12:00 AM     Lab Results   Component Value Date/Time    TSHULTRASEN 2.940 2016 08:25 AM      Cardiac Imaging and Procedures Review:    EKG dated 2023: My personal interpretation " is sinus rhythm, shortened NE interval, borderline left axis deviation, low voltages in the precordial leads, compared to prior ECG of 8/4/2021, no significant change except no longer meets criteria for prolonged QT interval    Echo dated 8/4/2021: Report of normal left and right ventricular systolic function.  Normal aortic root size.  No significant valvular stenosis or regurgitation.    Nuclear Perfusion Imaging dated 8/4/2021:   No evidence of ischemia or infarction with normal LVEF.    Coronary artery  calcification has been noted on a chest CT dated 11/9/2017    Assessment & Plan     1.  Hypertension.  Blood pressure is elevated here today.  Discussed with her her ECG findings which is not significantly abnormal.  Discussed with her why treatment of blood pressure is important.  She is willing to go back and try the losartan.  - Losartan 25 mg p.o. daily    2.  ECG demonstrates shortened NE interval, but she has no palpitations to suggest any preexcitation.  Therefore would monitor at this time.    3.  Although she had coronary artery calcification noted on CT 11/9/2017, she has no symptoms.  Therefore would just control risk factors which is hypertension.  Her lipids are actually not too bad.    Shared Medical Decision Making:  All of patient's questions were answered to the best of my knowledge and to patient's satisfaction. It was a pleasure seeing Ms. Ailyn Saavedra in my clinic today. Return in about 3 months (around 10/6/2023). Patient is aware to call the cardiology clinic with any questions or concerns.    Vangie Servin MD  [facility]  [address]  [place] [address]  Phone:  [phone]  Fax:  [phone]    Please note that this dictation was created using voice recognition software. I have made every reasonable attempt to correct obvious errors, but it is possible there are errors of grammar and possibly content that I did not discover before  finalizing the note.

## 2023-07-28 ENCOUNTER — TELEPHONE (OUTPATIENT)
Dept: ENDOCRINOLOGY | Facility: MEDICAL CENTER | Age: 83
End: 2023-07-28
Payer: MEDICARE

## 2023-08-01 ENCOUNTER — OFFICE VISIT (OUTPATIENT)
Dept: ENDOCRINOLOGY | Facility: MEDICAL CENTER | Age: 83
End: 2023-08-01
Payer: MEDICARE

## 2023-08-01 VITALS
WEIGHT: 143 LBS | DIASTOLIC BLOOD PRESSURE: 74 MMHG | HEART RATE: 64 BPM | HEIGHT: 69 IN | SYSTOLIC BLOOD PRESSURE: 122 MMHG | BODY MASS INDEX: 21.18 KG/M2 | OXYGEN SATURATION: 97 %

## 2023-08-01 DIAGNOSIS — E55.9 VITAMIN D DEFICIENCY: ICD-10-CM

## 2023-08-01 DIAGNOSIS — Z79.4 TYPE 2 DIABETES MELLITUS WITHOUT COMPLICATION, WITH LONG-TERM CURRENT USE OF INSULIN (HCC): ICD-10-CM

## 2023-08-01 DIAGNOSIS — Z13.29 SCREENING FOR THYROID DISORDER: ICD-10-CM

## 2023-08-01 DIAGNOSIS — E78.5 DYSLIPIDEMIA: ICD-10-CM

## 2023-08-01 DIAGNOSIS — E11.9 TYPE 2 DIABETES MELLITUS WITHOUT COMPLICATION, WITH LONG-TERM CURRENT USE OF INSULIN (HCC): ICD-10-CM

## 2023-08-01 LAB — RETINAL SCREEN: NEGATIVE

## 2023-08-01 PROCEDURE — 3078F DIAST BP <80 MM HG: CPT

## 2023-08-01 PROCEDURE — 3074F SYST BP LT 130 MM HG: CPT

## 2023-08-01 PROCEDURE — 99214 OFFICE O/P EST MOD 30 MIN: CPT

## 2023-08-01 PROCEDURE — 92250 FUNDUS PHOTOGRAPHY W/I&R: CPT

## 2023-08-01 PROCEDURE — 99212 OFFICE O/P EST SF 10 MIN: CPT

## 2023-08-01 ASSESSMENT — FIBROSIS 4 INDEX: FIB4 SCORE: 1.18

## 2023-08-01 NOTE — PROGRESS NOTES
Chief Complaint:  Consult requested by Heidi Medrano M.D. for initial evaluation of Type 2 Diabetes Mellitus    HPI:   Ailyn Saavedra is a 83 y.o. female with Type 2 Diabetes Mellitus diagnosed 2 years ago.  She denies hospitalizations for DKA in the past.  History of breast cancer.     Diabetes Type 2  POC A1c on 5/23/23 was 6.9    Current Medication:  Metformin  mg 4 times a day    She is not checking her blood sugars.     She denies hypoglycemic episodes occurring     She  denies hypoglycemic unawareness.   She denies episodes of severe hypoglycemia requiring third party assistance.    She  is not wearing a medical alert bracelet or necklace.    She does not a glucagon emergency kit.    She denies attending diabetes education classes.  Diet: fairly good  Activity : working around the house, walking.    Diabetes Complications   She  denies history of retinopathy.    She denies laser eye surgery.   Last eye exam: August 2023.    She denies history of peripheral sensory neuropathy.  She denies numbness, tingling in both feet.    She denies history of foot sores.     She denies history of kidney damage.    She is on ACE inhibitor or ARB.   Currently on losartan    Latest Reference Range & Units 05/23/23 00:30   Micro Alb Creat Ratio 0 - 30 mg/g see below   Creatinine, Urine mg/dL 44.03   Microalbumin, Urine Random mg/dL <1.2      Latest Reference Range & Units 03/30/22 07:45   GFR (CKD-EPI) >60 mL/min/1.73 m 2 59 !     Dyslipidemia  She denies history of coronary artery disease.    She  denies history of stroke and denies TIA.    She denies history of PAD.  She reports history of hyperlipidemia.  Currently not on statin   Latest Reference Range & Units 03/30/22 07:45   Cholesterol,Tot 100 - 199 mg/dL 207 (H)   Triglycerides 0 - 149 mg/dL 95   HDL >=40 mg/dL 72   LDL <100 mg/dL 116 (H)     Vitamin D deficiency  Currently taking multivitamin    Latest Reference Range & Units 03/18/16 08:25   25-Hydroxy    Vitamin D 25 30 - 100 ng/mL 26 (L)     ROS:     CONS:     No fever, no chills, no weight loss, no fatigue   EYES:      No diplopia, no blurry vision, no redness of eyes, no swelling of eyelids   ENT:    No hearing loss, No ear pain, No sore throat, no dysphagia, no neck swelling   CV:     No chest pain, no palpitations, no claudication, no orthopnea, no PND   PULM:    No SOB, no cough, no hemoptysis, no wheezing    GI:   No nausea, no vomiting, no diarrhea, no constipation, no bloody stools   :  Passing urine well, no dysuria, no hematuria   ENDO:   No polyuria, no polydipsia, no heat intolerance, no cold intolerance   NEURO: No headaches, no dizziness, no convulsions, no tremors   MUSC:  No joint swellings, no arthralgias, no myalgias, no weakness   SKIN:   No rash, no ulcers, no dry skin   PSYCH:   No depression, no anxiety, no difficulty sleeping       Past Medical History:  Patient Active Problem List    Diagnosis Date Noted    Chronic kidney disease, stage 3a (Formerly [facility]) 05/18/2023    Type 2 diabetes mellitus without complication (Formerly [facility]) 04/25/2023    Acute midline thoracic back pain 04/25/2023    Low back pain 04/25/2023    Primary hypertension 03/28/2023    Impacted cerumen of right ear 03/20/2023    Conductive hearing loss, bilateral 03/20/2023    Essential hypertension 10/18/2022    Encounter for weight loss counseling 04/26/2022    Injury of right wrist 04/26/2022    Muscle cramping 03/24/2022    Stress due to illness of family member 08/26/2021    History of breast cancer 09/12/2019    Broken heart syndrome 01/20/2015    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 at SURGERY SAME DAY [facility]    DILATION AND CURETTAGE  4/3/2013    Performed by Demetrio Webb M.D. at SURGERY SAME DAY [facility]    LUMPECTOMY      OTHER      cataracts    NC  RADIATION THERAPY PLAN SIMPLE          Allergies:  Patient has no known allergies.     Current Medications:    Current Outpatient Medications:     acetaminophen (TYLENOL) 325 MG Tab, Take 650 mg by mouth every four hours as needed., Disp: , Rfl:     losartan (COZAAR) 25 MG Tab, Take 1 Tablet by mouth every day., Disp: 100 Tablet, Rfl: 2    metFORMIN ER (GLUCOPHAGE XR) 500 MG TABLET SR 24 HR, TAKE 4 TABLETS BY MOUTH ONCE DAILY (Patient taking differently: 500 mg in the morning, at noon, and at bedtime.), Disp: 400 Tablet, Rfl: 3    glucose blood (CONTOUR NEXT TEST) strip, Testing blood sugars one time per day.  E11.65 (Patient not taking: Reported on 2023), Disp: 100 Strip, Rfl: 1    Lancets, Use one lancet to Test blood sugar three times daily before meals. (Patient not taking: Reported on 2023), Disp: 100 Each, Rfl: 0    Alcohol Swabs, Wipe site with prep pad prior to injection. (Patient not taking: Reported on 2023), Disp: 100 Each, Rfl: 0    Social History:  Social History     Socioeconomic History    Marital status:      Spouse name: Not on file    Number of children: Not on file    Years of education: Not on file    Highest education level: Not on file   Occupational History    Not on file   Tobacco Use    Smoking status: Former     Packs/day: 0.50     Years: 3.00     Pack years: 1.50     Types: Cigarettes     Quit date: 1959     Years since quittin.6    Smokeless tobacco: Never   Vaping Use    Vaping Use: Never used   Substance and Sexual Activity    Alcohol use: Yes     Comment: Occasionally    Drug use: No    Sexual activity: Not Currently     Partners: Male   Other Topics Concern    Not on file   Social History Narrative    Not on file     Social Determinants of Health     Financial Resource Strain: Not on file   Food Insecurity: Not on file   Transportation Needs: Not on file   Physical Activity: Not on file   Stress: Not on file   Social Connections: Not on file   Intimate  "Partner Violence: Not on file   Housing Stability: Not on file        Family History:   Family History   Problem Relation Age of Onset    Cancer Mother         breast     Cancer Sister         breast     Cancer Sister         breast     Cancer Sister         breast        PHYSICAL EXAM:   Vital signs: /74 (BP Location: Left arm, Patient Position: Sitting)   Pulse 64   Ht 1.753 m (5' 9\")   Wt 64.9 kg (143 lb)   SpO2 97%   BMI 21.12 kg/m²   GENERAL: Well-developed, well-nourished  in no apparent distress.   EYE: No ocular and eyelid asymmetry, Anicteric sclerae,  PERRL, No exophthalmos or lidlag  HENT: Hearing grossly intact, Normocephalic, atraumatic. Pink, moist mucous membranes, No exudate  NECK: Supple. Trachea midline.  CARDIOVASCULAR: Regular rate and rhythm. No murmurs, rubs, or gallops.   LUNGS: Clear to auscultation bilaterally   ABDOMEN: Soft, nontender with positive bowel sounds.   EXTREMITIES: No clubbing, cyanosis, or edema.   NEUROLOGICAL: Cranial nerves II-XII are grossly intact   Symmetric reflexes at the patella no proximal muscle weakness, No visible tremor with both outstretched hands  LYMPH: No cervical, supraclavicular,  adenopathy palpated.   SKIN: No rashes, lesions. Turgor is normal.  FOOT: Normal sensation to monofilament testing, normal pulses, no ulcers.  Normal Vibration quantitative sensation test.    Labs:  Lab Results   Component Value Date/Time    HBA1C 6.9 (A) 05/23/2023 1037    AVGLUC 306 08/04/2021 0020       Lab Results   Component Value Date/Time    WBC 6.3 03/30/2022 07:45 AM    WBC 9.5 10/31/2010 12:00 AM    RBC 4.77 03/30/2022 07:45 AM    RBC 5.19 10/31/2010 12:00 AM    HEMOGLOBIN 14.9 03/30/2022 07:45 AM    MCV 93.1 03/30/2022 07:45 AM    MCV 91 10/31/2010 12:00 AM    MCH 31.2 03/30/2022 07:45 AM    MCH 31.0 10/31/2010 12:00 AM    MCHC 33.6 03/30/2022 07:45 AM    RDW 43.6 03/30/2022 07:45 AM    RDW 13.0 10/31/2010 12:00 AM    MPV 9.9 03/30/2022 07:45 AM       Lab " Results   Component Value Date/Time    SODIUM 134 (L) 03/30/2022 07:45 AM    POTASSIUM 4.8 03/30/2022 07:45 AM    CHLORIDE 99 03/30/2022 07:45 AM    CO2 25 03/30/2022 07:45 AM    ANION 10.0 03/30/2022 07:45 AM    GLUCOSE 213 (H) 03/30/2022 07:45 AM    BUN 13 03/30/2022 07:45 AM    CREATININE 0.96 03/30/2022 07:45 AM    CALCIUM 9.9 03/30/2022 07:45 AM    ASTSGOT 17 03/30/2022 07:45 AM    ALTSGPT 16 03/30/2022 07:45 AM    TBILIRUBIN 0.6 03/30/2022 07:45 AM    ALBUMIN 4.1 03/30/2022 07:45 AM    TOTPROTEIN 6.3 03/30/2022 07:45 AM    GLOBULIN 2.2 03/30/2022 07:45 AM    AGRATIO 1.9 03/30/2022 07:45 AM       Lab Results   Component Value Date/Time    CHOLSTRLTOT 207 (H) 03/30/2022 0745    TRIGLYCERIDE 95 03/30/2022 0745    HDL 72 03/30/2022 0745     (H) 03/30/2022 0745       Lab Results   Component Value Date/Time    MALBCRT see below 05/23/2023 12:30 AM    MICROALBUR <1.2 05/23/2023 12:30 AM        Lab Results   Component Value Date/Time    TSHULTRASEN 2.940 03/18/2016 0825     No results found for: FREEDIR  No results found for: FREET3  No results found for: THYSTIMIG      ASSESSMENT/PLAN:     1. Type 2 diabetes mellitus without complication, with long-term current use of insulin (HCC)  Stable  Medication:  Metformin  mg 4 times a day - continue  Recommend diet low in fats and carbs  Recommend 20-30 mins daily activity  Recommend checking feet daily  - Comp Metabolic Panel; Future  - Lipid Profile; Future  - POCT Retinal Eye Exam    2. Dyslipidemia  Unstable  I will get lipid panel for further evaluation  - Lipid Profile; Future    3. Screening for thyroid disorder  I will get thyroid panel for further evaluation.   - FREE THYROXINE; Future  - TSH; Future    4. Vitamin D deficiency  Unstable  Continue current regimen -see HPI  I will get vitamin D levels for further evaluation.   - VITAMIN D,25 HYDROXY (DEFICIENCY); Future       Return in about 3 months (around 11/1/2023). Patient will have blood work done 2  weeks prior to next apt in 3 months.     Thank you kindly for allowing me to participate in the diabetes care plan for this patient.    Stew Perry, APRN   08/01/23    CC:   Heidi Medrano M.D.

## 2023-08-01 NOTE — PROGRESS NOTES
"RN-CDE Note    Subjective:   Endocrinology Clinic Progress Note  PCP: Heidi Medrano M.D.    HPI:  Ailyn Saavedra is a 83 y.o. old patient who is seen today by the Diabetes Nurse Specialist to establish care for her type 2 diabetes.  Increased stress due to son living with her and not doing anything.   Recent changes in health: none  DM:   Last A1c:   Lab Results   Component Value Date/Time    HBA1C 6.9 (A) 05/23/2023 10:37 AM          Diabetes Medications:   Metformin 500 mg tid  (states she remembers most of the time)      Exercise: has been decreased recently.  She was playing tennis but her back was hurting to much.  She plans on starting pickleball  Diet: \"healthy\" diet  in general  Patient's body mass index is 21.12 kg/m². Exercise and nutrition counseling were performed at this visit.    Glucose monitoring frequency: has not been testing.     Hypoglycemic episodes: no  Last Retinal Exam:  POC retinal exam done in office today   Daily Foot Exam: Yes   Foot Exam:  Monofilament testing with a 10 gram force: sensation intact: intact bilaterally  Visual Inspection: Feet without maceration, ulcers, fissures.  Pedal pulses: intact bilaterally    Lab Results   Component Value Date/Time    MALBCRT see below 05/23/2023 12:30 AM    MICROALBUR <1.2 05/23/2023 12:30 AM        ACR Albumin/Creatinine Ratio goal <30     HTN:   Blood pressure goal <130/<80 .   Currently Rx ACE/ARB: Yes     Dyslipidemia:    Lab Results   Component Value Date/Time    CHOLSTRLTOT 207 (H) 03/30/2022 07:45 AM     (H) 03/30/2022 07:45 AM    HDL 72 03/30/2022 07:45 AM    TRIGLYCERIDE 95 03/30/2022 07:45 AM         Currently Rx Statin: No     She  reports that she quit smoking about 64 years ago. Her smoking use included cigarettes. She has a 1.50 pack-year smoking history. She has never used smokeless tobacco.      Plan:     Discussed and educated on:   - All medications, side effects and compliance (discussed carefully)  - Annual eye " examinations at Ophthalmology  - Foot Care: what to look for when checking feet every day and when to contact HCP  - HbA1C: target  - Home glucose monitoring emphasized  - Weight control and daily exercise    Recommended medication changes: no change in medication at this time.  Follow up with me at PCP office in 3 months.

## 2023-08-09 ENCOUNTER — TELEPHONE (OUTPATIENT)
Dept: HEALTH INFORMATION MANAGEMENT | Facility: OTHER | Age: 83
End: 2023-08-09
Payer: MEDICARE

## 2023-08-14 ENCOUNTER — TELEPHONE (OUTPATIENT)
Dept: HEALTH INFORMATION MANAGEMENT | Facility: OTHER | Age: 83
End: 2023-08-14
Payer: MEDICARE

## 2023-09-19 ENCOUNTER — OFFICE VISIT (OUTPATIENT)
Dept: MEDICAL GROUP | Facility: MEDICAL CENTER | Age: 83
End: 2023-09-19
Payer: MEDICARE

## 2023-09-19 VITALS
WEIGHT: 141.8 LBS | DIASTOLIC BLOOD PRESSURE: 84 MMHG | BODY MASS INDEX: 21 KG/M2 | RESPIRATION RATE: 20 BRPM | HEIGHT: 69 IN | OXYGEN SATURATION: 97 % | TEMPERATURE: 96.7 F | SYSTOLIC BLOOD PRESSURE: 130 MMHG | HEART RATE: 59 BPM

## 2023-09-19 DIAGNOSIS — M54.6 CHRONIC LEFT-SIDED THORACIC BACK PAIN: ICD-10-CM

## 2023-09-19 DIAGNOSIS — M41.9 SCOLIOSIS OF THORACOLUMBAR SPINE, UNSPECIFIED SCOLIOSIS TYPE: ICD-10-CM

## 2023-09-19 DIAGNOSIS — G89.29 CHRONIC LEFT-SIDED THORACIC BACK PAIN: ICD-10-CM

## 2023-09-19 PROCEDURE — 3075F SYST BP GE 130 - 139MM HG: CPT | Performed by: STUDENT IN AN ORGANIZED HEALTH CARE EDUCATION/TRAINING PROGRAM

## 2023-09-19 PROCEDURE — 3079F DIAST BP 80-89 MM HG: CPT | Performed by: STUDENT IN AN ORGANIZED HEALTH CARE EDUCATION/TRAINING PROGRAM

## 2023-09-19 PROCEDURE — 99213 OFFICE O/P EST LOW 20 MIN: CPT | Performed by: STUDENT IN AN ORGANIZED HEALTH CARE EDUCATION/TRAINING PROGRAM

## 2023-09-19 RX ORDER — OMEPRAZOLE 20 MG/1
TABLET, DELAYED RELEASE ORAL
COMMUNITY
End: 2023-09-19

## 2023-09-19 RX ORDER — ACETAMINOPHEN 500 MG
1000 TABLET ORAL EVERY 8 HOURS PRN
Qty: 90 TABLET | Refills: 0 | Status: SHIPPED | OUTPATIENT
Start: 2023-09-19

## 2023-09-19 ASSESSMENT — FIBROSIS 4 INDEX: FIB4 SCORE: 1.18

## 2023-09-19 NOTE — PROGRESS NOTES
"Subjective:     CC: gradually worsening  chronic back pain    HPI:   Ailyn presents today with chronic back pain present for many years which is intermittently worsening usually with prolonged standing or bending over or walking around.  She feels slightly better when she is lying down with back support or sitting with the proper back support.  The pain is present few days and goes away for few days.  She had lumbar spine and thoracic spine x-rays done in April this year which shows multilevel degenerative disc disease and facet degeneration both in thoracic and lumbar regions, had scoliosis and lower lumbar spine shows grade 1 subluxation between L4 and L5.  Most of her chronic pain is in upper back thoracic region    Health Maintenance: Completed    ROS:  ROS    Review of systems unremarkable except for concerns noted by patient or items listed.    Please see HPI for additional ROS.      Objective:     Exam:  /84 (BP Location: Left arm, Patient Position: Sitting, BP Cuff Size: Adult long)   Pulse (!) 59   Temp 35.9 °C (96.7 °F) (Temporal)   Resp 20   Ht 1.753 m (5' 9\") Comment: Pt reported  Wt 64.3 kg (141 lb 12.8 oz)   SpO2 97%   BMI 20.94 kg/m²  Body mass index is 20.94 kg/m².    Physical Exam  Constitutional:       Appearance: Normal appearance.   HENT:      Head: Normocephalic.   Eyes:      General: No scleral icterus.  Cardiovascular:      Rate and Rhythm: Normal rate and regular rhythm.      Pulses: Normal pulses.      Heart sounds: Normal heart sounds.   Pulmonary:      Effort: Pulmonary effort is normal.      Breath sounds: Normal breath sounds.   Musculoskeletal:         General: Deformity present. No tenderness.      Right lower leg: No edema.      Left lower leg: No edema.      Comments: Mid thoracic region on the left paraspinal region.   Skin:     General: Skin is warm.   Neurological:      Mental Status: She is alert and oriented to person, place, and time.   Psychiatric:         Mood " and Affect: Mood normal.         Behavior: Behavior normal.             Labs: reviewed labs and imaging findings    Assessment & Plan:     83 y.o. female with the following -       1. Scoliosis of thoracolumbar spine, unspecified scoliosis type  Chronic, gradually worsening  Most likely pain due to scoliosis pain age-related degenerative changes  Recommended to take over-the-counter Tylenol 1000 mg 3 times daily as needed  Okay to use topical Bengay or Voltaren gel  Referral to physical therapy  - acetaminophen (TYLENOL) 500 MG Tab; Take 2 Tablets by mouth every 8 hours as needed for Moderate Pain.  Dispense: 90 Tablet; Refill: 0  - Referral to Physical Therapy    2. Chronic left-sided thoracic back pain  Chronic with acute worsening of symptoms, unstable    Patient with chronic left-sided thoracic back pain most likely due to scoliosis and degenerative changes.  Patient reports acute worsening of symptoms  - acetaminophen (TYLENOL) 500 MG Tab; Take 2 Tablets by mouth every 8 hours as needed for Moderate Pain.  Dispense: 90 Tablet; Refill: 0  - Referral to Physical Therapy tinnitus overall Tonny reevaluating  - Recommended topical Bengay 3 times a day as needed      Patient is recommended to get tetanus booster.  She is going to get her COVID-19 booster and flu vaccination next month and pharmacy    Return if symptoms worsen or fail to improve.    Please note that this dictation was created using voice recognition software. I have made every reasonable attempt to correct obvious errors, but I expect that there are errors of grammar and possibly content that I did not discover before finalizing the note.

## 2023-10-05 ENCOUNTER — HOSPITAL ENCOUNTER (EMERGENCY)
Facility: MEDICAL CENTER | Age: 83
End: 2023-10-05
Attending: EMERGENCY MEDICINE
Payer: MEDICARE

## 2023-10-05 ENCOUNTER — APPOINTMENT (OUTPATIENT)
Dept: RADIOLOGY | Facility: MEDICAL CENTER | Age: 83
End: 2023-10-05
Attending: EMERGENCY MEDICINE
Payer: MEDICARE

## 2023-10-05 VITALS
HEART RATE: 90 BPM | WEIGHT: 138.89 LBS | SYSTOLIC BLOOD PRESSURE: 189 MMHG | HEIGHT: 67 IN | DIASTOLIC BLOOD PRESSURE: 116 MMHG | TEMPERATURE: 97.5 F | RESPIRATION RATE: 18 BRPM | OXYGEN SATURATION: 95 % | BODY MASS INDEX: 21.8 KG/M2

## 2023-10-05 DIAGNOSIS — R40.4 TRANSIENT ALTERATION OF AWARENESS: ICD-10-CM

## 2023-10-05 LAB
ALBUMIN SERPL BCP-MCNC: 4.2 G/DL (ref 3.2–4.9)
ALBUMIN/GLOB SERPL: 1.4 G/DL
ALP SERPL-CCNC: 63 U/L (ref 30–99)
ALT SERPL-CCNC: 16 U/L (ref 2–50)
ANION GAP SERPL CALC-SCNC: 13 MMOL/L (ref 7–16)
APPEARANCE UR: CLEAR
AST SERPL-CCNC: 17 U/L (ref 12–45)
BASOPHILS # BLD AUTO: 0.5 % (ref 0–1.8)
BASOPHILS # BLD: 0.03 K/UL (ref 0–0.12)
BILIRUB SERPL-MCNC: 0.4 MG/DL (ref 0.1–1.5)
BILIRUB UR QL STRIP.AUTO: NEGATIVE
BUN SERPL-MCNC: 18 MG/DL (ref 8–22)
CALCIUM ALBUM COR SERPL-MCNC: 9.6 MG/DL (ref 8.5–10.5)
CALCIUM SERPL-MCNC: 9.8 MG/DL (ref 8.4–10.2)
CHLORIDE SERPL-SCNC: 97 MMOL/L (ref 96–112)
CO2 SERPL-SCNC: 24 MMOL/L (ref 20–33)
COLOR UR: NORMAL
CREAT SERPL-MCNC: 1.14 MG/DL (ref 0.5–1.4)
EKG IMPRESSION: NORMAL
EOSINOPHIL # BLD AUTO: 0.28 K/UL (ref 0–0.51)
EOSINOPHIL NFR BLD: 4.3 % (ref 0–6.9)
ERYTHROCYTE [DISTWIDTH] IN BLOOD BY AUTOMATED COUNT: 41.7 FL (ref 35.9–50)
GFR SERPLBLD CREATININE-BSD FMLA CKD-EPI: 47 ML/MIN/1.73 M 2
GLOBULIN SER CALC-MCNC: 3 G/DL (ref 1.9–3.5)
GLUCOSE SERPL-MCNC: 160 MG/DL (ref 65–99)
GLUCOSE UR STRIP.AUTO-MCNC: NEGATIVE MG/DL
HCT VFR BLD AUTO: 41.2 % (ref 37–47)
HGB BLD-MCNC: 13.9 G/DL (ref 12–16)
IMM GRANULOCYTES # BLD AUTO: 0.02 K/UL (ref 0–0.11)
IMM GRANULOCYTES NFR BLD AUTO: 0.3 % (ref 0–0.9)
KETONES UR STRIP.AUTO-MCNC: NEGATIVE MG/DL
LEUKOCYTE ESTERASE UR QL STRIP.AUTO: NEGATIVE
LYMPHOCYTES # BLD AUTO: 1.45 K/UL (ref 1–4.8)
LYMPHOCYTES NFR BLD: 22.5 % (ref 22–41)
MCH RBC QN AUTO: 32.1 PG (ref 27–33)
MCHC RBC AUTO-ENTMCNC: 33.7 G/DL (ref 32.2–35.5)
MCV RBC AUTO: 95.2 FL (ref 81.4–97.8)
MICRO URNS: NORMAL
MONOCYTES # BLD AUTO: 0.8 K/UL (ref 0–0.85)
MONOCYTES NFR BLD AUTO: 12.4 % (ref 0–13.4)
NEUTROPHILS # BLD AUTO: 3.87 K/UL (ref 1.82–7.42)
NEUTROPHILS NFR BLD: 60 % (ref 44–72)
NITRITE UR QL STRIP.AUTO: NEGATIVE
NRBC # BLD AUTO: 0 K/UL
NRBC BLD-RTO: 0 /100 WBC (ref 0–0.2)
PH UR STRIP.AUTO: 6.5 [PH] (ref 5–8)
PLATELET # BLD AUTO: 268 K/UL (ref 164–446)
PMV BLD AUTO: 9.7 FL (ref 9–12.9)
POTASSIUM SERPL-SCNC: 4 MMOL/L (ref 3.6–5.5)
PROT SERPL-MCNC: 7.2 G/DL (ref 6–8.2)
PROT UR QL STRIP: NEGATIVE MG/DL
RBC # BLD AUTO: 4.33 M/UL (ref 4.2–5.4)
RBC UR QL AUTO: NEGATIVE
SODIUM SERPL-SCNC: 134 MMOL/L (ref 135–145)
SP GR UR STRIP.AUTO: <=1.005
WBC # BLD AUTO: 6.5 K/UL (ref 4.8–10.8)

## 2023-10-05 PROCEDURE — 36415 COLL VENOUS BLD VENIPUNCTURE: CPT

## 2023-10-05 PROCEDURE — 85025 COMPLETE CBC W/AUTO DIFF WBC: CPT

## 2023-10-05 PROCEDURE — 81003 URINALYSIS AUTO W/O SCOPE: CPT

## 2023-10-05 PROCEDURE — 93005 ELECTROCARDIOGRAM TRACING: CPT | Performed by: EMERGENCY MEDICINE

## 2023-10-05 PROCEDURE — 80053 COMPREHEN METABOLIC PANEL: CPT

## 2023-10-05 PROCEDURE — 70450 CT HEAD/BRAIN W/O DYE: CPT

## 2023-10-05 PROCEDURE — 71045 X-RAY EXAM CHEST 1 VIEW: CPT

## 2023-10-05 PROCEDURE — 94760 N-INVAS EAR/PLS OXIMETRY 1: CPT

## 2023-10-05 PROCEDURE — 99285 EMERGENCY DEPT VISIT HI MDM: CPT

## 2023-10-05 ASSESSMENT — FIBROSIS 4 INDEX: FIB4 SCORE: 1.18

## 2023-10-05 NOTE — ED PROVIDER NOTES
ED Provider Note    CHIEF COMPLAINT  Chief Complaint   Patient presents with    Other     Confusion today       EXTERNAL RECORDS REVIEWED  Outpatient Notes primary care notes    HPI/ROS  LIMITATION TO HISTORY   Select: : None  OUTSIDE HISTORIAN(S):  Family daughter who is at the bedside    Ailyn Saavedra is a 83 y.o. female who presents for evaluation of confusion.  The patient states that she was supposed to be going to her hairdresser today when she became confused as to where she needed to go.  She ultimately was able to get there and then was able to get home.  However, she did have trouble finding her car when attempting to drive home.  She went home and her daughter told her to take an aspirin and lie down.  She brings her in now for evaluation.  The patient states she is feeling fine.  She states she has had a mild frontal headache.  There is been no recent: Fever, chills, URI symptoms, cardiorespiratory symptoms, gastrointestinal symptoms, unilateral motor weakness or paresthesias.  No other acute symptomatology or complaints.    PAST MEDICAL HISTORY   has a past medical history of Breast cancer (HCC), Breath shortness, Cancer (HCC), CATARACT, and Other specified symptom associated with female genital organs.    SURGICAL HISTORY   has a past surgical history that includes lumpectomy; radiation therapy plan simple; other; hysteroscopy with video operative (4/3/2013); and dilation and curettage (4/3/2013).    FAMILY HISTORY  Family History   Problem Relation Age of Onset    Cancer Mother         breast     Cancer Sister         breast     Cancer Sister         breast     Cancer Sister         breast        SOCIAL HISTORY  Social History     Tobacco Use    Smoking status: Former     Current packs/day: 0.00     Average packs/day: 0.5 packs/day for 3.0 years (1.5 ttl pk-yrs)     Types: Cigarettes     Start date: 1956     Quit date: 1959     Years since quittin.8    Smokeless tobacco: Never  "  Vaping Use    Vaping Use: Never used   Substance and Sexual Activity    Alcohol use: Yes     Comment: Occasionally    Drug use: No    Sexual activity: Not Currently     Partners: Male       CURRENT MEDICATIONS  Home Medications       Reviewed by Aubree Patel R.N. (Registered Nurse) on 10/05/23 at 1449  Med List Status: Not Addressed     Medication Last Dose Status   acetaminophen (TYLENOL) 500 MG Tab  Active   Alcohol Swabs  Active   glucose blood (CONTOUR NEXT TEST) strip  Active   Lancets  Active   losartan (COZAAR) 25 MG Tab  Active   metFORMIN ER (GLUCOPHAGE XR) 500 MG TABLET SR 24 HR  Active                    ALLERGIES  No Known Allergies    PHYSICAL EXAM  VITAL SIGNS: BP (!) 160/95   Pulse 65   Temp 36.5 °C (97.7 °F) (Temporal)   Resp 18   Ht 1.702 m (5' 7\")   Wt 63 kg (138 lb 14.2 oz)   SpO2 96%   BMI 21.75 kg/m²    Constitutional: 83-year-old pleasant female,  Non-toxic appearance.   HENT: Normocephalic, Atraumatic, Nares:Clear, Oropharynx: moist, well hydrated, posterior pharynx:clear   Eyes: PERRL, EOMI, Conjunctiva normal, No discharge.   Neck: Normal range of motion, No tenderness, Supple, No stridor.   Lymphatic: No lymphadenopathy noted.   Cardiovascular: Regular rate and rhythm without mumurs, gallups, rubs   Thorax & Lungs: Normal Equal breath sounds, No respiratory distress, No wheezing, no stridor, no rales. No chest tenderness.   Abdomen: Soft, nontender, nondistended, no organomegaly, positive bowel sounds normal in quality. No guarding or rebound.  Skin: Good skin turgor, pink, warm, dry. No rashes, petechiae, purpura. Normal capillary refill.   Back: No tenderness, No CVA tenderness.   Extremities: Intact distal pulses, No edema, No tenderness, No cyanosis,  Vascular: Pulses are 2+, symmetric in the upper and lower extremities.  Musculoskeletal: Good range of motion in all major joints. No tenderness to palpation or major deformities noted.   Neurologic: Alert & oriented x 3,  No " gross focal deficits noted.   Psychiatric: Affect normal, Judgment normal, Mood normal.       DIAGNOSTIC STUDIES / PROCEDURES  EKG  I have independently interpreted this EKG  Rate 70, rhythm sinus, left axis deviation, MA QRS QT intervals normal, LVH, no acute STEMI, twelve-lead EKG, no change compared to previous tracing    LABS  CBC and differential within normal limits; CMP shows sodium 134, random glucose 160, GFR 47, otherwise within normal limits; urinalysis negative;    RADIOLOGY  I have independently interpreted the diagnostic imaging associated with this visit and am waiting the final reading from the radiologist.   My preliminary interpretation is as follows: Chest x-ray showed no evidence pneumonia; CT of the head showed no intracranial bleeding or findings to suggest stroke;  Radiologist interpretation:   DX-CHEST-PORTABLE (1 VIEW)   Final Result      1.  Probable fibrotic changes of the lung bases   2.  No pneumonia or pneumothorax.      CT-HEAD W/O   Final Result      1.  There is no acute intracranial hemorrhage or infarct.      2.  White matter lucencies most consistent with small vessel ischemic change versus demyelination or gliosis.      3.  There is cerebral atrophy.               COURSE & MEDICAL DECISION MAKING    ED Observation Status? Yes; I am placing the patient in to an observation status due to a diagnostic uncertainty as well as therapeutic intensity. Patient placed in observation status at 4:29 PM, 10/5/2023.     Observation plan is as follows: Patient will undergo laboratory and imaging studies.  These will be reassessed and appropriate disposition will be made after reevaluation and review.    Upon Reevaluation, the patient's condition has: Improved; and will be discharged.    Patient discharged from ED Observation status at 1845 (Time) 10/5/2023 (Date).     INITIAL ASSESSMENT, COURSE AND PLAN  Care Narrative: At this time, the patient presents for evaluation of diffuse global  amnesia.  She does not describe unilateral stroke symptomatology.  She was asymptomatic upon arrival here.  Patient was observed and reexamination revealed normal neurological exam.  CT scanning was unremarkable and laboratory and imaging studies were also reassuring.  Based on the patient's findings I feel we can safely discharge her.  I discussed the findings and treatment plan with the patient and her daughter.  They indicate that they are comfortable with this explanation and disposition.        ADDITIONAL PROBLEM LIST  1.  Possible TIA    DISPOSITION AND DISCUSSIONS  I have discussed management of the patient with the following physicians and BRIDGETTE's:  none    Discussion of management with other QHP or appropriate source(s): None     Escalation of care considered, and ultimately not performed:acute inpatient care management, however at this time, the patient is most appropriate for outpatient management      Decision tools and prescription drugs considered including, but not limited to:  Various potential medications considered but none indicated at this time except I will place patient on baby aspirin .    FINAL DIAGNOSIS  1. Transient alteration of awareness             Electronically signed by: Guy G Gansert, M.D., 10/5/2023 4:29 PM

## 2023-10-05 NOTE — ED TRIAGE NOTES
"Patient presents with daughter after having some confusion earlier today when out driving to her appointment around 1030. Patient reports she feels closer to her normal self now. Daughter states that patient has had this in the past and had \"mini strokes\". Patient also reports frequency with urination. Patient denies any other neurological complaints. BP grossly hypertensive in triage.  "

## 2023-10-11 ENCOUNTER — APPOINTMENT (OUTPATIENT)
Dept: MEDICAL GROUP | Facility: MEDICAL CENTER | Age: 83
End: 2023-10-11
Payer: MEDICARE

## 2023-10-17 ENCOUNTER — OFFICE VISIT (OUTPATIENT)
Dept: MEDICAL GROUP | Facility: MEDICAL CENTER | Age: 83
End: 2023-10-17
Payer: MEDICARE

## 2023-10-17 VITALS
HEART RATE: 67 BPM | BODY MASS INDEX: 22.13 KG/M2 | HEIGHT: 67 IN | DIASTOLIC BLOOD PRESSURE: 80 MMHG | WEIGHT: 141 LBS | TEMPERATURE: 97.6 F | SYSTOLIC BLOOD PRESSURE: 126 MMHG | OXYGEN SATURATION: 97 % | RESPIRATION RATE: 18 BRPM

## 2023-10-17 DIAGNOSIS — E11.9 TYPE 2 DIABETES MELLITUS WITHOUT COMPLICATION, UNSPECIFIED WHETHER LONG TERM INSULIN USE (HCC): ICD-10-CM

## 2023-10-17 DIAGNOSIS — I10 PRIMARY HYPERTENSION: ICD-10-CM

## 2023-10-17 DIAGNOSIS — I10 ESSENTIAL HYPERTENSION: Chronic | ICD-10-CM

## 2023-10-17 DIAGNOSIS — G45.9 TIA (TRANSIENT ISCHEMIC ATTACK): ICD-10-CM

## 2023-10-17 PROCEDURE — 3074F SYST BP LT 130 MM HG: CPT | Performed by: FAMILY MEDICINE

## 2023-10-17 PROCEDURE — 3079F DIAST BP 80-89 MM HG: CPT | Performed by: FAMILY MEDICINE

## 2023-10-17 PROCEDURE — 99214 OFFICE O/P EST MOD 30 MIN: CPT | Performed by: FAMILY MEDICINE

## 2023-10-17 RX ORDER — ASPIRIN 81 MG/1
81 TABLET ORAL DAILY
COMMUNITY

## 2023-10-17 ASSESSMENT — FIBROSIS 4 INDEX: FIB4 SCORE: 1.33

## 2023-10-17 NOTE — ASSESSMENT & PLAN NOTE
Has endo consult coming up      Lab Results   Component Value Date/Time    HBA1C 6.9 (A) 05/23/2023 10:37 AM

## 2023-10-17 NOTE — PROGRESS NOTES
This medical record contains text that has been entered with the assistance of computer voice recognition and dictation software.  Therefore, it may contain unintended errors in text, spelling, punctuation, or grammar        Chief Complaint   Patient presents with    Transitional Care Management Hospital Follow-up     DX: Transient alteration of awareness        Ailyn Saavedra is a 83 y.o. female here evaluation and management of:       Current Outpatient Medications   Medication Sig Dispense Refill    aspirin 81 MG EC tablet Take 81 mg by mouth every day.      acetaminophen (TYLENOL) 500 MG Tab Take 2 Tablets by mouth every 8 hours as needed for Moderate Pain. 90 Tablet 0    losartan (COZAAR) 25 MG Tab Take 1 Tablet by mouth every day. 100 Tablet 2    metFORMIN ER (GLUCOPHAGE XR) 500 MG TABLET SR 24 HR TAKE 4 TABLETS BY MOUTH ONCE DAILY (Patient taking differently: 500 mg in the morning, at noon, and at bedtime.) 400 Tablet 3    Alcohol Swabs Wipe site with prep pad prior to injection. 100 Each 0     No current facility-administered medications for this visit.     Patient Active Problem List    Diagnosis Date Noted    TIA (transient ischemic attack) 10/17/2023    Scoliosis of thoracolumbar spine 09/19/2023    Chronic left-sided thoracic back pain 09/19/2023    Vitamin D deficiency 08/01/2023    Chronic kidney disease, stage 3a (HCC) 05/18/2023    Type 2 diabetes mellitus without complication (HCC) 04/25/2023    Low back pain 04/25/2023    Primary hypertension 03/28/2023    Impacted cerumen of right ear 03/20/2023    Conductive hearing loss, bilateral 03/20/2023    Essential hypertension 10/18/2022    Encounter for weight loss counseling 04/26/2022    Injury of right wrist 04/26/2022    Muscle cramping 03/24/2022    Stress due to illness of family member 08/26/2021    History of breast cancer 09/12/2019    Broken heart syndrome 01/20/2015    Cancer of skin of eyelid 04/30/2014     Past Surgical History:  "  Procedure Laterality Date    HYSTEROSCOPY WITH VIDEO OPERATIVE  4/3/2013    Performed by Demetrio Webb M.D. at SURGERY SAME DAY [facility] ORS    DILATION AND CURETTAGE  4/3/2013    Performed by Demetrio Webb M.D. at SURGERY SAME DAY [facility] ORS    LUMPECTOMY      OTHER      cataracts    CT RADIATION THERAPY PLAN SIMPLE        Social History     Tobacco Use    Smoking status: Former     Current packs/day: 0.00     Average packs/day: 0.5 packs/day for 3.0 years (1.5 ttl pk-yrs)     Types: Cigarettes     Start date: 1956     Quit date: 1959     Years since quittin.8    Smokeless tobacco: Never   Vaping Use    Vaping Use: Never used   Substance Use Topics    Alcohol use: Yes     Comment: Occasionally    Drug use: No     Family History   Problem Relation Age of Onset    Cancer Mother         breast     Cancer Sister         breast     Cancer Sister         breast     Cancer Sister         breast            ROS    all review of system completed and negative except for those listed above     Objective:     /80 (BP Location: Left arm, Patient Position: Sitting, BP Cuff Size: Adult long)   Pulse 67   Temp 36.4 °C (97.6 °F) (Temporal)   Resp 18   Ht 1.702 m (5' 7\")   Wt 64 kg (141 lb)   SpO2 97%  Body mass index is 22.08 kg/m².  Physical Exam:        GEN: comfortable, alert and oriented, well nourished, well developed, in no apparent distress   HEENT: NCAT, eyes: pupils equal and reactive, sclera white, EOMIT, good dentition  HEART: limbs warm and well perfused, regular rate, no JVD, no lower extremity edema  LUNGS: speaking in full sentences, not in apparent respiratory distress, no audible wheezes  MSK: normal tone and bulk, no swelling of the joints, gait steady and normal           Assessment and Plan:   The following treatment plan was discussed        Problem List Items Addressed This Visit       Essential hypertension (Chronic)     At goal today          Primary hypertension     At " goal today   But it appears that she already has an appt scheduled from prior non compliance   And would appreciate their involvement in case of any abnormal findings on cardiology studies              Type 2 diabetes mellitus without complication (HCC)     Has endo consult coming up      Lab Results   Component Value Date/Time    HBA1C 6.9 (A) 05/23/2023 10:37 AM               TIA (transient ischemic attack)     Reviewed hospital course in detail   Presented with confusion   Conclusion from evaluation imaging and labs was that she likely had a TIA   Plan for appropriate cardiac and vascular work up   1 mo follow up in clinic with my colleague   It would also be excellent if we could get specialist involvement early to help manage, ref to vascular placed   Long term she should est with neurology as she does have e/o neuro atrophy clinically and on imaging   30+ min spent         Relevant Orders    Referral to Vascular Medicine    Referral to Neurology    [facility] ZIO PATCH MONITOR    EC-ECHOCARDIOGRAM COMPLETE W/O CONT    CT-CTA HEAD WITH & W/O-POST PROCESS    US-CAROTID DOPPLER BILAT             Instructed to follow up if symptoms worsen or fail to improve, ER/UC precautions discussed as well    Heidi Medrano MD  [facility], Family Medicine   [address] [place] [address] [address]   [place] [address]  Phone: [phone]

## 2023-10-17 NOTE — ASSESSMENT & PLAN NOTE
At goal today   But it appears that she already has an appt scheduled from prior non compliance   And would appreciate their involvement in case of any abnormal findings on cardiology studies

## 2023-10-17 NOTE — ASSESSMENT & PLAN NOTE
Reviewed hospital course in detail   Presented with confusion   Conclusion from evaluation imaging and labs was that she likely had a TIA   Plan for appropriate cardiac and vascular work up   1 mo follow up in clinic with my colleague   It would also be excellent if we could get specialist involvement early to help manage, ref to vascular placed   Long term she should est with neurology as she does have e/o neuro atrophy clinically and on imaging

## 2023-10-23 ENCOUNTER — TELEPHONE (OUTPATIENT)
Dept: HEALTH INFORMATION MANAGEMENT | Facility: OTHER | Age: 83
End: 2023-10-23
Payer: MEDICARE

## 2023-10-24 ENCOUNTER — TELEPHONE (OUTPATIENT)
Dept: VASCULAR LAB | Facility: MEDICAL CENTER | Age: 83
End: 2023-10-24
Payer: MEDICARE

## 2023-11-03 ENCOUNTER — DOCUMENTATION (OUTPATIENT)
Dept: VASCULAR LAB | Facility: MEDICAL CENTER | Age: 83
End: 2023-11-03
Payer: MEDICARE

## 2023-11-03 NOTE — PROGRESS NOTES
Left message for patient to call back and schedule new patient appt with Dr. Acuña. Next available ok. Direct linic line given on vm.       Litzy Garnett, Medical Assistant   [facility] Vascular Medicine   Ph: [phone]  Fx: [phone]

## 2023-11-05 ENCOUNTER — DOCUMENTATION (OUTPATIENT)
Dept: VASCULAR LAB | Facility: MEDICAL CENTER | Age: 83
End: 2023-11-05
Payer: MEDICARE

## 2023-11-06 NOTE — PROGRESS NOTES
Patient referred to vascular care  Unfortunately our schedulers have been unable to reach patient despite multiple attempts  Unless patient establishes with a face-to-face visit in our office will be unable to take part in care  Await further patient contact.  Pending further contact, we will defer all further management of vascular disease and its risk factors to PCP and/or referring MD.    Michael J. Bloch, MD  Vascular Care    cc:  ANICETO Medrano

## 2023-11-17 ENCOUNTER — OFFICE VISIT (OUTPATIENT)
Dept: MEDICAL GROUP | Facility: MEDICAL CENTER | Age: 83
End: 2023-11-17
Payer: MEDICARE

## 2023-11-17 VITALS
WEIGHT: 140.4 LBS | SYSTOLIC BLOOD PRESSURE: 152 MMHG | HEART RATE: 66 BPM | TEMPERATURE: 97.1 F | BODY MASS INDEX: 22.03 KG/M2 | DIASTOLIC BLOOD PRESSURE: 82 MMHG | OXYGEN SATURATION: 96 % | HEIGHT: 67 IN

## 2023-11-17 DIAGNOSIS — M41.9 SCOLIOSIS OF THORACOLUMBAR SPINE, UNSPECIFIED SCOLIOSIS TYPE: ICD-10-CM

## 2023-11-17 DIAGNOSIS — M54.50 LOW BACK PAIN, UNSPECIFIED BACK PAIN LATERALITY, UNSPECIFIED CHRONICITY, UNSPECIFIED WHETHER SCIATICA PRESENT: ICD-10-CM

## 2023-11-17 DIAGNOSIS — I10 ESSENTIAL HYPERTENSION: Chronic | ICD-10-CM

## 2023-11-17 PROCEDURE — 99214 OFFICE O/P EST MOD 30 MIN: CPT | Performed by: STUDENT IN AN ORGANIZED HEALTH CARE EDUCATION/TRAINING PROGRAM

## 2023-11-17 PROCEDURE — 3077F SYST BP >= 140 MM HG: CPT | Performed by: STUDENT IN AN ORGANIZED HEALTH CARE EDUCATION/TRAINING PROGRAM

## 2023-11-17 PROCEDURE — 3079F DIAST BP 80-89 MM HG: CPT | Performed by: STUDENT IN AN ORGANIZED HEALTH CARE EDUCATION/TRAINING PROGRAM

## 2023-11-17 RX ORDER — LOSARTAN POTASSIUM 50 MG/1
50 TABLET ORAL DAILY
Qty: 100 TABLET | Refills: 0
Start: 2023-11-17 | End: 2023-11-30 | Stop reason: SDUPTHER

## 2023-11-17 ASSESSMENT — ENCOUNTER SYMPTOMS
WHEEZING: 0
FALLS: 0
FEVER: 0
PALPITATIONS: 0
DIZZINESS: 0
BACK PAIN: 1
HEADACHES: 0
SORE THROAT: 0
NAUSEA: 0
DEPRESSION: 0
BLOOD IN STOOL: 0
MYALGIAS: 0
HEARTBURN: 0
SHORTNESS OF BREATH: 0
BLURRED VISION: 0
COUGH: 0

## 2023-11-17 ASSESSMENT — FIBROSIS 4 INDEX: FIB4 SCORE: 1.33

## 2023-11-17 NOTE — PROGRESS NOTES
Subjective:     CC: Blood pressure follow-up, back pain    HPI:   Ailyn presents today with      Problem   Scoliosis of Thoracolumbar Spine    Patient with history of chronic low back pain and upper thoracic back pain.  Patient reports that thoracic back pain has gradually been worsening.  She takes over-the-counter Tylenol with some benefit.  Pain is worse when she walks around or stands on her feet for long time.    Patient with history of degenerative disc findings and x-rays done in April this year  Reports she did not get physical therapy so orders were placed last time     Essential Hypertension    Chronic uncontrolled  Patient has multiple high blood pressure readings in the past.  Was started on losartan 25 mg by cardiology a few months back.  Patient reports compliance taking medication every day.  She is not checking her blood pressure at home.  Today blood pressure was 152/82 , repeat was about the same 150/78.  no chest pain or shortness of breath, denies lightheadedness or dizziness.              Health Maintenance: Completed    ROS:  Review of Systems   Constitutional:  Positive for malaise/fatigue. Negative for fever.   HENT:  Negative for congestion and sore throat.    Eyes:  Negative for blurred vision.   Respiratory:  Negative for cough, shortness of breath and wheezing.    Cardiovascular:  Negative for chest pain, palpitations and leg swelling.   Gastrointestinal:  Negative for blood in stool, heartburn and nausea.   Genitourinary:  Negative for dysuria and urgency.   Musculoskeletal:  Positive for back pain and joint pain. Negative for falls and myalgias.   Neurological:  Negative for dizziness and headaches.   Psychiatric/Behavioral:  Negative for depression and suicidal ideas.        Review of systems unremarkable except for concerns noted by patient or items listed.    Please see HPI for additional ROS.      Objective:     Exam:  BP (!) 152/82 (BP Location: Left arm, Patient Position:  "Sitting, BP Cuff Size: Large adult)   Pulse 66   Temp 36.2 °C (97.1 °F) (Temporal)   Ht 1.702 m (5' 7\")   Wt 63.7 kg (140 lb 6.4 oz)   SpO2 96%   BMI 21.99 kg/m²  Body mass index is 21.99 kg/m².    Physical Exam  Constitutional:       Appearance: Normal appearance.   HENT:      Head: Normocephalic.   Eyes:      General: No scleral icterus.  Cardiovascular:      Rate and Rhythm: Normal rate and regular rhythm.      Pulses: Normal pulses.      Heart sounds: Normal heart sounds.   Pulmonary:      Effort: Pulmonary effort is normal.      Breath sounds: Normal breath sounds.   Musculoskeletal:      Right lower leg: No edema.      Left lower leg: No edema.   Skin:     General: Skin is warm.   Neurological:      Mental Status: She is alert and oriented to person, place, and time.   Psychiatric:         Mood and Affect: Mood normal.         Behavior: Behavior normal.             Labs: Reviewed    Assessment & Plan:     83 y.o. female with the following -     1. Essential hypertension  Chronic, uncontrolled  Extensive counseling and education provided for patient to start checking blood pressure at home.  Encouraged to maintain a log.  Given the blood pressures are still high with systolics more than 150, I have asked patient to increase the dose of losartan to 50 mg once daily  If blood pressure reading showed low systolic blood pressure less than 100, instructed to hold off the medication and reach back to us.  Strict ER precautions discussed for uncontrolled blood pressures with systolic more than 180s or if symptoms of chest pain shortness of breath or acute vision changes or headaches.  - losartan (COZAAR) 50 MG Tab; Take 1 Tablet by mouth every day.  Dispense: 100 Tablet; Refill: 0    2. Scoliosis of thoracolumbar spine, unspecified scoliosis type  3. Low back pain, unspecified back pain laterality, unspecified chronicity, unspecified whether sciatica present  With history of chronic back pain, gradually " worsening.  Patient was instructed to see a physical therapist and referral was placed around June.  Patient states that she has not gone for physical therapy yet.  I have encouraged her to make an appointment with physical therapy.  Okay to take over-the-counter Tylenol 1000 mg 3 times daily as needed or   - Referral to Physical Therapy        Return in about 4 weeks (around 12/15/2023) for hypertension.    Please note that this dictation was created using voice recognition software. I have made every reasonable attempt to correct obvious errors, but I expect that there are errors of grammar and possibly content that I did not discover before finalizing the note.

## 2023-11-21 ENCOUNTER — APPOINTMENT (OUTPATIENT)
Dept: NEUROLOGY | Facility: MEDICAL CENTER | Age: 83
End: 2023-11-21
Attending: INTERNAL MEDICINE
Payer: MEDICARE

## 2023-11-30 DIAGNOSIS — Z79.4 TYPE 2 DIABETES MELLITUS WITH OTHER SPECIFIED COMPLICATION, WITH LONG-TERM CURRENT USE OF INSULIN (HCC): ICD-10-CM

## 2023-11-30 DIAGNOSIS — I10 ESSENTIAL HYPERTENSION: Chronic | ICD-10-CM

## 2023-11-30 DIAGNOSIS — E11.69 TYPE 2 DIABETES MELLITUS WITH OTHER SPECIFIED COMPLICATION, WITH LONG-TERM CURRENT USE OF INSULIN (HCC): ICD-10-CM

## 2023-12-01 RX ORDER — METFORMIN HYDROCHLORIDE 500 MG/1
TABLET, EXTENDED RELEASE ORAL
Qty: 400 TABLET | Refills: 0 | Status: SHIPPED | OUTPATIENT
Start: 2023-12-01

## 2023-12-01 RX ORDER — LOSARTAN POTASSIUM 50 MG/1
50 TABLET ORAL DAILY
Qty: 100 TABLET | Refills: 0 | Status: SHIPPED | OUTPATIENT
Start: 2023-12-01

## 2024-03-05 ENCOUNTER — OFFICE VISIT (OUTPATIENT)
Dept: MEDICAL GROUP | Facility: MEDICAL CENTER | Age: 84
End: 2024-03-05
Payer: MEDICARE

## 2024-03-05 VITALS
HEIGHT: 67 IN | OXYGEN SATURATION: 97 % | RESPIRATION RATE: 20 BRPM | DIASTOLIC BLOOD PRESSURE: 84 MMHG | SYSTOLIC BLOOD PRESSURE: 134 MMHG | BODY MASS INDEX: 22.6 KG/M2 | TEMPERATURE: 97.7 F | WEIGHT: 144 LBS | HEART RATE: 65 BPM

## 2024-03-05 DIAGNOSIS — Z23 NEED FOR VACCINATION: ICD-10-CM

## 2024-03-05 DIAGNOSIS — E11.9 TYPE 2 DIABETES MELLITUS WITHOUT COMPLICATION (HCC): ICD-10-CM

## 2024-03-05 DIAGNOSIS — R41.3 MEMORY IMPAIRMENT: ICD-10-CM

## 2024-03-05 DIAGNOSIS — Z02.4 ENCOUNTER FOR CDL (COMMERCIAL DRIVING LICENSE) EXAM: ICD-10-CM

## 2024-03-05 LAB
HBA1C MFR BLD: 6.4 % (ref ?–5.8)
POCT INT CON NEG: NEGATIVE
POCT INT CON POS: POSITIVE

## 2024-03-05 PROCEDURE — 99215 OFFICE O/P EST HI 40 MIN: CPT | Mod: 25 | Performed by: FAMILY MEDICINE

## 2024-03-05 PROCEDURE — 3075F SYST BP GE 130 - 139MM HG: CPT | Performed by: FAMILY MEDICINE

## 2024-03-05 PROCEDURE — 90471 IMMUNIZATION ADMIN: CPT | Performed by: FAMILY MEDICINE

## 2024-03-05 PROCEDURE — 3079F DIAST BP 80-89 MM HG: CPT | Performed by: FAMILY MEDICINE

## 2024-03-05 PROCEDURE — 83036 HEMOGLOBIN GLYCOSYLATED A1C: CPT | Performed by: FAMILY MEDICINE

## 2024-03-05 PROCEDURE — 90715 TDAP VACCINE 7 YRS/> IM: CPT | Performed by: FAMILY MEDICINE

## 2024-03-05 ASSESSMENT — PATIENT HEALTH QUESTIONNAIRE - PHQ9: CLINICAL INTERPRETATION OF PHQ2 SCORE: 0

## 2024-03-05 ASSESSMENT — FIBROSIS 4 INDEX: FIB4 SCORE: 1.33

## 2024-03-05 NOTE — PROGRESS NOTES
This medical record contains text that has been entered with the assistance of computer voice recognition and dictation software.  Therefore, it may contain unintended errors in text, spelling, punctuation, or grammar        Chief Complaint   Patient presents with    Paperwork     DMV forms        Ailyn Saavedra is a 83 y.o. female here evaluation and management of:       Current Outpatient Medications   Medication Sig Dispense Refill    metFORMIN ER (GLUCOPHAGE XR) 500 MG TABLET SR 24 HR TAKE 4 TABLETS BY MOUTH ONCE DAILY 400 Tablet 0    losartan (COZAAR) 50 MG Tab Take 1 Tablet by mouth every day. 100 Tablet 0    aspirin 81 MG EC tablet Take 81 mg by mouth every day.      acetaminophen (TYLENOL) 500 MG Tab Take 2 Tablets by mouth every 8 hours as needed for Moderate Pain. 90 Tablet 0    Alcohol Swabs Wipe site with prep pad prior to injection. 100 Each 0     No current facility-administered medications for this visit.     Patient Active Problem List    Diagnosis Date Noted    TIA (transient ischemic attack) 10/17/2023    Scoliosis of thoracolumbar spine 09/19/2023    Chronic left-sided thoracic back pain 09/19/2023    Vitamin D deficiency 08/01/2023    Chronic kidney disease, stage 3a (HCC) 05/18/2023    Type 2 diabetes mellitus without complication (HCC) 04/25/2023    Low back pain 04/25/2023    Primary hypertension 03/28/2023    Impacted cerumen of right ear 03/20/2023    Conductive hearing loss, bilateral 03/20/2023    Essential hypertension 10/18/2022    Encounter for CDL (commercial driving license) exam 04/26/2022    Injury of right wrist 04/26/2022    Muscle cramping 03/24/2022    Stress due to illness of family member 08/26/2021    History of breast cancer 09/12/2019    Broken heart syndrome 01/20/2015    Cancer of skin of eyelid 04/30/2014     Past Surgical History:   Procedure Laterality Date    HYSTEROSCOPY WITH VIDEO OPERATIVE  4/3/2013    Performed by Demetrio Webb M.D. at [facility]    DILATION AND CURETTAGE  4/3/2013    Performed by Demetrio Webb M.D. at SURGERY SAME DAY [facility] ORS    LUMPECTOMY      OTHER      cataracts    [place] RADIATION THERAPY PLAN SIMPLE        Social History     Tobacco Use    Smoking status: Former     Current packs/day: 0.00     Average packs/day: 0.5 packs/day for 3.0 years (1.5 ttl pk-yrs)     Types: Cigarettes     Start date: 1956     Quit date: 1959     Years since quittin.2    Smokeless tobacco: Never   Vaping Use    Vaping Use: Never used   Substance Use Topics    Alcohol use: Yes     Comment: Occasionally    Drug use: No     Family History   Problem Relation Age of Onset    Cancer Mother         breast     Cancer Sister         breast     Cancer Sister         breast     Cancer Sister         breast            ROS    all review of system completed and negative except for those listed above     Objective:     /84 (BP Location: Right arm, Patient Position: Sitting, BP Cuff Size: Adult)   Pulse 65   Temp 36.5 °C (97.7 °F) (Temporal)   Resp 20   Ht 1.702 m (5' 7\")   Wt 65.3 kg (144 lb)   SpO2 97%  Body mass index is 22.55 kg/m².  Physical Exam:        GEN: comfortable, alert , well nourished, well developed, in no apparent distress   HEENT: NCAT, eyes: pupils equal and reactive, sclera white, EOMIT, good dentition  HEART: limbs warm and well perfused, regular rate, no JVD, no lower extremity edema  LUNGS: speaking in full sentences, not in apparent respiratory distress, no audible wheezes  MSK: normal tone and bulk, no swelling of the joints, gait steady and normal           Assessment and Plan:   The following treatment plan was discussed        Problem List Items Addressed This Visit       Encounter for CDL (commercial driving license) exam     Patient presents with DMV forms     I do offer her a perminant disability placcard     However I do not recommend that she drive and I explain this to Ailyn.  It has become clear " during our time together that she is suffering from memory impairment.  With this and with her recent TIA I referred her to neurology.  But she has not followed through with this at least in the [facility] system.  I anticipate neurology will have the same recommendation at follow up.   It also appears that she has established with Carey Durbin, a Geriatrician for her pcp.  I have her sign forms so that our chronic care/complex care management team can communicate with her daughter and son regarding her health needs.        This is a delicate and emotional subject for suad cuellar, and we are additionally challenged by her short term memory.  Extra time is spent  45+ min spent          Relevant Orders    REFERRAL TO CARE MANAGEMENT    Type 2 diabetes mellitus without complication (HCC)    Relevant Orders    POCT A1C (Completed)    REFERRAL TO CARE MANAGEMENT     Other Visit Diagnoses       Need for vaccination        Relevant Orders    Tdap Vaccine =>6YO IM    Memory impairment        Relevant Orders    REFERRAL TO CARE MANAGEMENT                  Instructed to follow up if symptoms worsen or fail to improve, ER/UC precautions discussed as well    Heidi Medrano MD  [facility], Family Medicine   [address] [place] [address] [address]   [place] [address]  Phone: [phone]

## 2024-03-05 NOTE — ASSESSMENT & PLAN NOTE
Patient presents with DMV forms     I do offer her a perminant disability placcard     However I do not recommend that she drive and I explain this to Suad.  It has become clear during our time together that she is suffering from memory impairment.  With this and with her recent TIA I referred her to neurology.  But she has not followed through with this at least in the [facility] system.  I anticipate neurology will have the same recommendation at follow up.   It also appears that she has established with Carey Durbin, a Geriatrician for her pcp.  I have her sign forms so that our chronic care/complex care management team can communicate with her daughter and son regarding her health needs.        This is a delicate and emotional subject for suad naturally, and we are additionally challenged by her short term memory.  Extra time is spent  45+ min spent

## 2024-03-06 ENCOUNTER — DOCUMENTATION (OUTPATIENT)
Dept: HEALTH INFORMATION MANAGEMENT | Facility: OTHER | Age: 84
End: 2024-03-06
Payer: MEDICARE

## 2024-03-06 ENCOUNTER — PATIENT OUTREACH (OUTPATIENT)
Dept: HEALTH INFORMATION MANAGEMENT | Facility: OTHER | Age: 84
End: 2024-03-06
Payer: MEDICARE

## 2024-03-06 NOTE — PROGRESS NOTES
Outpatient Social Work Follow-Up    Synopsis: [facility] SW received a referral from PCP to assist pt with care coordination and possible social needs. Pt has memory issues. Note from the provider stated to contact daughter or son. Only daughter Beata is listed in the contacts in My chart.  @[phone] SW called Beata. She wasn't sure and was not aware of the referral to . Beata stated that she is currently at work, requested this SW contact information so she can call back when she has more time.    Plan: MARTINE awaiting a call back from Beata.

## 2024-04-01 DIAGNOSIS — E11.69 TYPE 2 DIABETES MELLITUS WITH OTHER SPECIFIED COMPLICATION, WITH LONG-TERM CURRENT USE OF INSULIN (HCC): ICD-10-CM

## 2024-04-01 DIAGNOSIS — I10 ESSENTIAL HYPERTENSION: Chronic | ICD-10-CM

## 2024-04-01 DIAGNOSIS — Z79.4 TYPE 2 DIABETES MELLITUS WITH OTHER SPECIFIED COMPLICATION, WITH LONG-TERM CURRENT USE OF INSULIN (HCC): ICD-10-CM

## 2024-04-02 RX ORDER — METFORMIN HYDROCHLORIDE 500 MG/1
TABLET, EXTENDED RELEASE ORAL
Qty: 400 TABLET | Refills: 2 | Status: SHIPPED | OUTPATIENT
Start: 2024-04-02

## 2024-04-02 RX ORDER — LOSARTAN POTASSIUM 50 MG/1
50 TABLET ORAL DAILY
Qty: 100 TABLET | Refills: 2 | Status: SHIPPED | OUTPATIENT
Start: 2024-04-02

## 2024-04-09 ENCOUNTER — HOSPITAL ENCOUNTER (OUTPATIENT)
Facility: MEDICAL CENTER | Age: 84
End: 2024-04-10
Attending: EMERGENCY MEDICINE | Admitting: INTERNAL MEDICINE
Payer: MEDICARE

## 2024-04-09 ENCOUNTER — APPOINTMENT (OUTPATIENT)
Dept: RADIOLOGY | Facility: MEDICAL CENTER | Age: 84
End: 2024-04-09
Attending: EMERGENCY MEDICINE
Payer: MEDICARE
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
## 2024-04-09 DIAGNOSIS — M25.562 ACUTE PAIN OF LEFT KNEE: ICD-10-CM

## 2024-04-09 DIAGNOSIS — S82.192A OTHER CLOSED FRACTURE OF PROXIMAL END OF LEFT TIBIA, INITIAL ENCOUNTER: ICD-10-CM
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
## 2024-04-09 DIAGNOSIS — S82.102A CLOSED FRACTURE OF PROXIMAL END OF LEFT TIBIA, UNSPECIFIED FRACTURE MORPHOLOGY, INITIAL ENCOUNTER: ICD-10-CM

## 2024-04-09 LAB — GLUCOSE BLD STRIP.AUTO-MCNC: 187 MG/DL (ref 65–99)

## 2024-04-09 PROCEDURE — 73564 X-RAY EXAM KNEE 4 OR MORE: CPT | Mod: LT

## 2024-04-09 PROCEDURE — 99285 EMERGENCY DEPT VISIT HI MDM: CPT

## 2024-04-09 PROCEDURE — 82962 GLUCOSE BLOOD TEST: CPT

## 2024-04-09 PROCEDURE — 99222 1ST HOSP IP/OBS MODERATE 55: CPT | Mod: FS | Performed by: INTERNAL MEDICINE

## 2024-04-09 PROCEDURE — 73700 CT LOWER EXTREMITY W/O DYE: CPT | Mod: LT

## 2024-04-09 PROCEDURE — G0378 HOSPITAL OBSERVATION PER HR: HCPCS

## 2024-04-09 PROCEDURE — 96374 THER/PROPH/DIAG INJ IV PUSH: CPT

## 2024-04-09 PROCEDURE — 700102 HCHG RX REV CODE 250 W/ 637 OVERRIDE(OP)

## 2024-04-09 PROCEDURE — 700111 HCHG RX REV CODE 636 W/ 250 OVERRIDE (IP)

## 2024-04-09 PROCEDURE — 96372 THER/PROPH/DIAG INJ SC/IM: CPT | Mod: XU

## 2024-04-09 RX ORDER — OXYCODONE HYDROCHLORIDE 5 MG/1
2.5 TABLET ORAL
Status: DISCONTINUED | OUTPATIENT
Start: 2024-04-09 | End: 2024-04-10 | Stop reason: HOSPADM

## 2024-04-09 RX ORDER — DEXTROSE MONOHYDRATE 25 G/50ML
25 INJECTION, SOLUTION INTRAVENOUS
Status: DISCONTINUED | OUTPATIENT
Start: 2024-04-09 | End: 2024-04-10 | Stop reason: HOSPADM

## 2024-04-09 RX ORDER — HEPARIN SODIUM 5000 [USP'U]/ML
5000 INJECTION, SOLUTION INTRAVENOUS; SUBCUTANEOUS EVERY 8 HOURS
Status: DISCONTINUED | OUTPATIENT
Start: 2024-04-09 | End: 2024-04-10 | Stop reason: HOSPADM

## 2024-04-09 RX ORDER — OXYCODONE HYDROCHLORIDE 5 MG/1
5 TABLET ORAL
Status: DISCONTINUED | OUTPATIENT
Start: 2024-04-09 | End: 2024-04-10 | Stop reason: HOSPADM

## 2024-04-09 RX ORDER — ACETAMINOPHEN 325 MG/1
650 TABLET ORAL EVERY 4 HOURS PRN
Status: DISCONTINUED | OUTPATIENT
Start: 2024-04-09 | End: 2024-04-10 | Stop reason: HOSPADM

## 2024-04-09 RX ORDER — LABETALOL HYDROCHLORIDE 5 MG/ML
10 INJECTION, SOLUTION INTRAVENOUS EVERY 4 HOURS PRN
Status: DISCONTINUED | OUTPATIENT
Start: 2024-04-09 | End: 2024-04-10 | Stop reason: HOSPADM

## 2024-04-09 RX ORDER — ONDANSETRON 2 MG/ML
4 INJECTION INTRAMUSCULAR; INTRAVENOUS EVERY 4 HOURS PRN
Status: DISCONTINUED | OUTPATIENT
Start: 2024-04-09 | End: 2024-04-10 | Stop reason: HOSPADM

## 2024-04-09 RX ORDER — LOSARTAN POTASSIUM 50 MG/1
50 TABLET ORAL DAILY
Status: DISCONTINUED | OUTPATIENT
Start: 2024-04-10 | End: 2024-04-10 | Stop reason: HOSPADM

## 2024-04-09 RX ADMIN — LABETALOL HYDROCHLORIDE 10 MG: 5 INJECTION INTRAVENOUS at 22:07

## 2024-04-09 RX ADMIN — INSULIN HUMAN 1 UNITS: 100 INJECTION, SOLUTION PARENTERAL at 21:38

## 2024-04-09 RX ADMIN — HEPARIN SODIUM 5000 UNITS: 5000 INJECTION, SOLUTION INTRAVENOUS; SUBCUTANEOUS at 21:38

## 2024-04-09 ASSESSMENT — PATIENT HEALTH QUESTIONNAIRE - PHQ9
SUM OF ALL RESPONSES TO PHQ9 QUESTIONS 1 AND 2: 0
1. LITTLE INTEREST OR PLEASURE IN DOING THINGS: NOT AT ALL
SUM OF ALL RESPONSES TO PHQ9 QUESTIONS 1 AND 2: 0
1. LITTLE INTEREST OR PLEASURE IN DOING THINGS: NOT AT ALL
2. FEELING DOWN, DEPRESSED, IRRITABLE, OR HOPELESS: NOT AT ALL

## 2024-04-09 ASSESSMENT — ENCOUNTER SYMPTOMS
CONSTITUTIONAL NEGATIVE: 1
EYES NEGATIVE: 1
CARDIOVASCULAR NEGATIVE: 1
NEUROLOGICAL NEGATIVE: 1
RESPIRATORY NEGATIVE: 1
GASTROINTESTINAL NEGATIVE: 1

## 2024-04-09 ASSESSMENT — LIFESTYLE VARIABLES
ALCOHOL_USE: YES
TOTAL SCORE: 0
HAVE PEOPLE ANNOYED YOU BY CRITICIZING YOUR DRINKING: NO
ON A TYPICAL DAY WHEN YOU DRINK ALCOHOL HOW MANY DRINKS DO YOU HAVE: 1
CONSUMPTION TOTAL: NEGATIVE
HOW MANY TIMES IN THE PAST YEAR HAVE YOU HAD 5 OR MORE DRINKS IN A DAY: 0
HAVE YOU EVER FELT YOU SHOULD CUT DOWN ON YOUR DRINKING: NO
AVERAGE NUMBER OF DAYS PER WEEK YOU HAVE A DRINK CONTAINING ALCOHOL: 1
EVER HAD A DRINK FIRST THING IN THE MORNING TO STEADY YOUR NERVES TO GET RID OF A HANGOVER: NO
TOTAL SCORE: 0
EVER FELT BAD OR GUILTY ABOUT YOUR DRINKING: NO
TOTAL SCORE: 0

## 2024-04-09 ASSESSMENT — FIBROSIS 4 INDEX
FIB4 SCORE: 1.35
FIB4 SCORE: 1.35

## 2024-04-09 ASSESSMENT — PAIN DESCRIPTION - PAIN TYPE
TYPE: ACUTE PAIN

## 2024-04-09 NOTE — ED NOTES
Knee immobilizer place. Attempted to stand pt. Pt able to stand but states that there is pain in her knee when standing

## 2024-04-09 NOTE — ED PROVIDER NOTES
ER Provider Note    Scribed for Jacquelyn Delgado M.D.  by Capo Camacho. 4/9/2024  10:06 AM    Primary Care Provider: Heidi Medrano M.D.    CHIEF COMPLAINT  Chief Complaint   Patient presents with    T-5000 GLF     Pt was watching over son after surgery when he became unsteady and fell on pt last night. Pt c/o left knee pain.     Knee Injury     left     LIMITATION TO HISTORY   None    HPI/ROS  OUTSIDE HISTORIAN(S):  None    EXTERNAL RECORDS REVIEWED  Outpatient Notes outpatient primary care note reviewed she seems to have some memory impairment.    Ailyn Saavedra is a 84 y.o. female who presents to the ED via EMS for left knee pain secondary to GLF. Patient states last night she was helping her son who was unsteady on his feet when he fell into her, causing her to fall to the floor. She landed on her left knee where she now has pain. Patient is unable to bear weight without significant pain, but is able to range her knee. She denies any numbness or weakness. Patient did not hit her head or lose consciousness.     PAST MEDICAL HISTORY  Past Medical History:   Diagnosis Date    Breast cancer (HCC)     Breath shortness     with exercise    Cancer (HCC)     eye lid and breast     CATARACT     Diabetes (HCC)     Other specified symptom associated with female genital organs        SURGICAL HISTORY  Past Surgical History:   Procedure Laterality Date    HYSTEROSCOPY WITH VIDEO OPERATIVE  4/3/2013    Performed by Demetrio Webb M.D. at SURGERY SAME DAY [facility] ORS    DILATION AND CURETTAGE  4/3/2013    [facility].D[facility]    LUMPECTOMY      OTHER      cataracts    [place] RADIATION THERAPY PLAN SIMPLE         FAMILY HISTORY  Family History   Problem Relation Age of Onset    Cancer Mother         breast     Cancer Sister         breast     Cancer Sister         breast     Cancer Sister         breast        SOCIAL HISTORY   reports that she quit smoking about 65 years  ago. Her smoking use included cigarettes. She started smoking about 68 years ago. She has a 1.5 pack-year smoking history. She has never used smokeless tobacco. She reports current alcohol use. She reports that she does not use drugs.    CURRENT MEDICATIONS  Previous Medications    ACETAMINOPHEN (TYLENOL) 500 MG TAB    Take 2 Tablets by mouth every 8 hours as needed for Moderate Pain.    ALCOHOL SWABS    Wipe site with prep pad prior to injection.    ASPIRIN 81 MG EC TABLET    Take 81 mg by mouth every day.    LOSARTAN (COZAAR) 50 MG TAB    Take 1 Tablet by mouth every day.    METFORMIN ER (GLUCOPHAGE XR) 500 MG TABLET SR 24 HR    TAKE 4 TABLETS BY MOUTH ONCE DAILY       ALLERGIES  Patient has no known allergies.    PHYSICAL EXAM  BP (!) 173/78   Pulse 72   Temp 36.3 °C (97.4 °F) (Temporal)   Resp 16   Wt 65.3 kg (144 lb)   SpO2 97%   BMI 22.55 kg/m²   Constitutional: Alert in no apparent distress.  HENT: No signs of trauma, Bilateral external ears normal, Nose normal.   Eyes: Pupils are equal and reactive, Conjunctiva normal, Non-icteric.   Cardiovascular: Regular rate and rhythm, no murmurs.   Thorax & Lungs: Normal breath sounds, No respiratory distress, No wheezing, No chest tenderness.   Abdomen: Bowel sounds normal, Soft, No tenderness, No masses, No peritoneal signs.  Skin: Warm, Dry, No erythema, No rash.   Musculoskeletal: Left knee tenderness. No major deformities noted.   Neurologic: Alert, moving all extremities without difficulty, no focal deficits.    DIAGNOSTIC STUDIES    RADIOLOGY  The attending Emergency Physician has independently interpreted the diagnostic imaging associated with this visit and is awaiting the final reading from the radiologist, which will be displayed below.    Preliminary interpretation is a follows: No obvious fracture on x-ray.    Radiologist interpretation:   CT-KNEE W/O PLUS RECONS LEFT   Final Result         1. Osteopenia.   2. Nondisplaced fracture involving the  anterior cortex of the proximal tibia.      DX-KNEE COMPLETE 4+ LEFT   Final Result         1.  No radiographic evidence of acute injury.           COURSE & MEDICAL DECISION MAKING     ASSESSMENT, COURSE AND PLAN  Care Narrative: This an 84-year-old female who presents with left knee pain after her son fell into her last night.  She did not have any head or neck injury or tenderness and therefore given this happened last night I do not think additional imaging of her head or neck is indicated.  I did an x-ray of her knee which was negative put her in a knee immobilizer and she was unable to put any weight on this.  Therefore I did do a CT scan to evaluate for an occult fracture and there is a small nondisplaced fracture at the anterior cortex of the proximal tibia.  Orthopedics was consulted and recommended nonweightbearing status and outpatient follow-up.  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  She is unable to be nonweightbearing and go to the bathroom by herself.  Given this fracture I will hospitalize her for pain control and PT OT evaluation possible rehab placement.  Patient is agreeable.  She was seen by social work as her disposition was going to be a challenge given her living situation.        10:06 AM - Patient seen and evaluated at bedside. Ordered DX-knee complete left to evaluate. She understands and agrees to the plan of care. Differential diagnoses include but are not limited to: Fracture or sprain    12:28 PM - Ordered for knee immobilizer; patient continues to refuse to bear weight on her leg so will order for CT. Her daughter at bedside discussed concerns of patient needing help at home. Will consult with Social Work.     3:25 PM - CT showed non-displaced fracture involving the anterior cortex of proximal tibia. Consulted with Ortho Katey MANCIA recommends nonweightbearing and outpatient follow up. Updated patient and daughter on results and plan  of care.     3:34 PM - I discussed the patient's case and the above findings with Dr. Solis (Hospitalist) who agrees to evaluate the patient for hospitalization.                    DISPOSITION AND DISCUSSIONS  I have discussed management of the patient with the following physicians and BRIDGETTE's: Katey MANCIA, Dr. Solis (Hospitalist)     Discussion of management with other \[facility]\"" or appropriate source(s): Social Work    , PT      DISPOSITION:  Patient will be hospitalized by Dr. Solis in guarded condition.    FINAL IMPRESSION   1. Fall, initial encounter    2. Acute pain of left knee    3. Closed fracture of proximal end of left tibia, unspecified fracture morphology, initial encounter         I, Capo Camacho (Scribe), am scribing for, and in the presence of, Jacquelyn Delgado M.D..    Electronically signed by: Capo Camacho (Scribe), 4/9/2024    IJacquelyn M.D. personally performed the services described in this documentation, as scribed by Capo Camacho in my presence, and it is both accurate and complete.    The note accurately reflects work and decisions made by me.  Jacquelyn Delgado M.D.  4/9/2024  5:28 PM

## 2024-04-09 NOTE — DISCHARGE PLANNING
Pt states she needs help and can not care for self at home due to knee injury. Pt does not fell Home Health will be good for at this time and is inquiring about Skilled Care.   MARTINE spoke to ERP and she place PT/OT orders. SW notified PT/OT and they will evaluate Pt and make recommendations. MARTINE updated TCN for SCP.   MARTINE will monitor for PT/OT notes and DC Plan accordingly.

## 2024-04-09 NOTE — ED NOTES
Med Rec complete per patient   Allergies reviewed  Antibiotics in the past 30 days:no  Anticoagulant in past 14 days:no  [facility] [address]

## 2024-04-09 NOTE — ED TRIAGE NOTES
Pt to rm 21 H .  Chief Complaint   Patient presents with    T-5000 GLF     Pt was watching over son after surgery when he became unsteady and fell on pt last night. Pt c/o left knee pain.     Knee Injury     left

## 2024-04-10 ENCOUNTER — PATIENT OUTREACH (OUTPATIENT)
Dept: SCHEDULING | Facility: IMAGING CENTER | Age: 84
End: 2024-04-10
Payer: MEDICARE

## 2024-04-10 VITALS
TEMPERATURE: 97.7 F | DIASTOLIC BLOOD PRESSURE: 75 MMHG | SYSTOLIC BLOOD PRESSURE: 150 MMHG | OXYGEN SATURATION: 94 % | BODY MASS INDEX: 20.78 KG/M2 | WEIGHT: 137.13 LBS | HEART RATE: 64 BPM | HEIGHT: 68 IN | RESPIRATION RATE: 16 BRPM

## 2024-04-10 PROBLEM — S82.102A CLOSED FRACTURE OF PROXIMAL END OF LEFT TIBIA: Status: ACTIVE | Noted: 2024-04-10

## 2024-04-10 LAB
ANION GAP SERPL CALC-SCNC: 14 MMOL/L (ref 7–16)
BUN SERPL-MCNC: 19 MG/DL (ref 8–22)
CALCIUM SERPL-MCNC: 9.2 MG/DL (ref 8.5–10.5)
CHLORIDE SERPL-SCNC: 102 MMOL/L (ref 96–112)
CO2 SERPL-SCNC: 21 MMOL/L (ref 20–33)
CREAT SERPL-MCNC: 0.95 MG/DL (ref 0.5–1.4)
ERYTHROCYTE [DISTWIDTH] IN BLOOD BY AUTOMATED COUNT: 42.3 FL (ref 35.9–50)
EST. AVERAGE GLUCOSE BLD GHB EST-MCNC: 143 MG/DL
GFR SERPLBLD CREATININE-BSD FMLA CKD-EPI: 59 ML/MIN/1.73 M 2
GLUCOSE BLD STRIP.AUTO-MCNC: 156 MG/DL (ref 65–99)
GLUCOSE BLD STRIP.AUTO-MCNC: 207 MG/DL (ref 65–99)
GLUCOSE BLD STRIP.AUTO-MCNC: 222 MG/DL (ref 65–99)
GLUCOSE SERPL-MCNC: 161 MG/DL (ref 65–99)
HBA1C MFR BLD: 6.6 % (ref 4–5.6)
HCT VFR BLD AUTO: 39.8 % (ref 37–47)
HGB BLD-MCNC: 13.6 G/DL (ref 12–16)
MCH RBC QN AUTO: 31.9 PG (ref 27–33)
MCHC RBC AUTO-ENTMCNC: 34.2 G/DL (ref 32.2–35.5)
MCV RBC AUTO: 93.4 FL (ref 81.4–97.8)
PLATELET # BLD AUTO: 228 K/UL (ref 164–446)
PMV BLD AUTO: 9.9 FL (ref 9–12.9)
POTASSIUM SERPL-SCNC: 3.8 MMOL/L (ref 3.6–5.5)
RBC # BLD AUTO: 4.26 M/UL (ref 4.2–5.4)
SODIUM SERPL-SCNC: 137 MMOL/L (ref 135–145)
WBC # BLD AUTO: 8.8 K/UL (ref 4.8–10.8)

## 2024-04-10 PROCEDURE — 99239 HOSP IP/OBS DSCHRG MGMT >30: CPT | Mod: FS | Performed by: INTERNAL MEDICINE

## 2024-04-10 PROCEDURE — 82962 GLUCOSE BLOOD TEST: CPT

## 2024-04-10 PROCEDURE — A9270 NON-COVERED ITEM OR SERVICE: HCPCS

## 2024-04-10 PROCEDURE — 97535 SELF CARE MNGMENT TRAINING: CPT

## 2024-04-10 PROCEDURE — 97530 THERAPEUTIC ACTIVITIES: CPT

## 2024-04-10 PROCEDURE — 700111 HCHG RX REV CODE 636 W/ 250 OVERRIDE (IP)

## 2024-04-10 PROCEDURE — G0378 HOSPITAL OBSERVATION PER HR: HCPCS

## 2024-04-10 PROCEDURE — 96372 THER/PROPH/DIAG INJ SC/IM: CPT

## 2024-04-10 PROCEDURE — 80048 BASIC METABOLIC PNL TOTAL CA: CPT

## 2024-04-10 PROCEDURE — 700102 HCHG RX REV CODE 250 W/ 637 OVERRIDE(OP)

## 2024-04-10 PROCEDURE — 83036 HEMOGLOBIN GLYCOSYLATED A1C: CPT

## 2024-04-10 PROCEDURE — 85027 COMPLETE CBC AUTOMATED: CPT

## 2024-04-10 PROCEDURE — 97165 OT EVAL LOW COMPLEX 30 MIN: CPT

## 2024-04-10 PROCEDURE — 97162 PT EVAL MOD COMPLEX 30 MIN: CPT

## 2024-04-10 PROCEDURE — 99232 SBSQ HOSP IP/OBS MODERATE 35: CPT | Performed by: NURSE PRACTITIONER

## 2024-04-10 PROCEDURE — 36415 COLL VENOUS BLD VENIPUNCTURE: CPT

## 2024-04-10 RX ORDER — OXYCODONE HYDROCHLORIDE 5 MG/1
5 TABLET ORAL EVERY 8 HOURS PRN
Qty: 10 TABLET | Refills: 0 | Status: SHIPPED | OUTPATIENT
Start: 2024-04-10 | End: 2024-04-13

## 2024-04-10 RX ORDER — HYDROMORPHONE HYDROCHLORIDE 1 MG/ML
0.5 INJECTION, SOLUTION INTRAMUSCULAR; INTRAVENOUS; SUBCUTANEOUS
Status: DISCONTINUED | OUTPATIENT
Start: 2024-04-10 | End: 2024-04-10 | Stop reason: HOSPADM

## 2024-04-10 RX ORDER — KETOROLAC TROMETHAMINE 15 MG/ML
15 INJECTION, SOLUTION INTRAMUSCULAR; INTRAVENOUS EVERY 6 HOURS PRN
Status: DISCONTINUED | OUTPATIENT
Start: 2024-04-10 | End: 2024-04-10 | Stop reason: HOSPADM

## 2024-04-10 RX ORDER — MENTHOL AND METHYL SALICYLATE 7.6; 29 G/100G; G/100G
OINTMENT TOPICAL PRN
Status: DISCONTINUED | OUTPATIENT
Start: 2024-04-10 | End: 2024-04-10 | Stop reason: HOSPADM

## 2024-04-10 RX ADMIN — HEPARIN SODIUM 5000 UNITS: 5000 INJECTION, SOLUTION INTRAVENOUS; SUBCUTANEOUS at 06:16

## 2024-04-10 RX ADMIN — MENTHOL AND METHYL SALICYLATE: 7.6; 29 OINTMENT TOPICAL at 02:39

## 2024-04-10 RX ADMIN — LOSARTAN POTASSIUM 50 MG: 50 TABLET, FILM COATED ORAL at 06:15

## 2024-04-10 RX ADMIN — INSULIN HUMAN 2 UNITS: 100 INJECTION, SOLUTION PARENTERAL at 07:48

## 2024-04-10 RX ADMIN — ACETAMINOPHEN 650 MG: 325 TABLET ORAL at 02:43

## 2024-04-10 RX ADMIN — INSULIN HUMAN 1 UNITS: 100 INJECTION, SOLUTION PARENTERAL at 11:17

## 2024-04-10 ASSESSMENT — PAIN DESCRIPTION - PAIN TYPE
TYPE: ACUTE PAIN

## 2024-04-10 ASSESSMENT — ENCOUNTER SYMPTOMS
ABDOMINAL PAIN: 0
SPEECH CHANGE: 0
SHORTNESS OF BREATH: 0
FOCAL WEAKNESS: 0
BACK PAIN: 0
FEVER: 0
NAUSEA: 0
VOMITING: 0
TINGLING: 0
SENSORY CHANGE: 0
DIZZINESS: 0
HEADACHES: 0
DOUBLE VISION: 0
MYALGIAS: 1
BLURRED VISION: 0
CHILLS: 0
NECK PAIN: 0

## 2024-04-10 ASSESSMENT — GAIT ASSESSMENTS: GAIT LEVEL OF ASSIST: UNABLE TO PARTICIPATE

## 2024-04-10 ASSESSMENT — LIFESTYLE VARIABLES: SUBSTANCE_ABUSE: 0

## 2024-04-10 ASSESSMENT — COGNITIVE AND FUNCTIONAL STATUS - GENERAL
TOILETING: A LITTLE
SUGGESTED CMS G CODE MODIFIER DAILY ACTIVITY: CJ
HELP NEEDED FOR BATHING: A LITTLE
DAILY ACTIVITIY SCORE: 21
DRESSING REGULAR LOWER BODY CLOTHING: A LITTLE

## 2024-04-10 ASSESSMENT — ACTIVITIES OF DAILY LIVING (ADL): TOILETING: INDEPENDENT

## 2024-04-10 NOTE — ASSESSMENT & PLAN NOTE
Nondisplaced proximal tibia fracture.  Non-operative management.  Weight bearing status - Weightbearing as tolerated LLE.  Gerard Taylor MD. Orthopedic Surgeon. [facility].

## 2024-04-10 NOTE — DISCHARGE PLANNING
HTH/SCP TCN chart review completed. Collaborated with CM prior to meeting with the pt. The most current review of medical record, knowledge of pt's PLOF and social support, LACE+ score of 73, no 6 clicks scores documented.    Noted per discussion with CM, CM already obtained choice and obtained history, including patient live with son, however patient assists son at baseline.  Patient does not have DME at baseline, and believes that she will need post acute placement once seen by PT/OT.  Per discussion with PT, recommending post acute placement (IRF), however will await formal recommendation.  TCN did not visit patient in order to not duplicate services, as patient has PCP f/u already scheduled for 4/25.  Noted blanket SNF referral sent, with multiple accepting facilities.     Completed today:  OT recommending post acute placement - 4/10  Awaiting forma PT recommendations  Choice obtained: SNF/HH choice obtained by   SCP with [facility] PCP. Has PCP follow up 4/25.

## 2024-04-10 NOTE — DISCHARGE PLANNING
Discussed in AM rounds, Anticipate SNF vs HHC. Spoke with patient at bedside and discussed options and updated awaiting therapy eval. Choice fors filled out with verbal consent and signed by patient. Choice forms faxed to [facility]. Message sent to [facility] to update with request to scan choice forms to Media tab. Ty SCP TCN updated.

## 2024-04-10 NOTE — DISCHARGE SUMMARY
Discharge Summary    CHIEF COMPLAINT ON ADMISSION  Chief Complaint   Patient presents with    T-5000 GLF     Pt was watching over son after surgery when he became unsteady and fell on pt last night. Pt c/o left knee pain.     Knee Injury     left       Reason for Admission  EMS     Admission Date  4/9/2024    CODE STATUS  Full Code    HPI & HOSPITAL COURSE    Sarah Saavedra is a 84 y.o. female with a pmhx of type 2 DM on metformin, and htn.who presented 4/9/2024 after suffering a ground level fall after a family member had fallen on top of her.     According to the patient she was taking care of her son post surgery when he had tripped on there rug and fell on top of her injuring her left knee. Per the patient she did not hit her head and is not currently taking blood thinners. She attempted to walk after her injury but was unable to do so due to significant pain and was unable to bear weight. She denies numbness or tingling or any significant pain when not attempting to bear weight or move her leg. 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     In the ER her initial X-Ray of her knee was negative for any acute fracture. However, ERP ordered a CT which revealed a nondisplaced fracture involving the left anterior cortex of the proximal tibia. Orthopedics was consulted in the ER and reccommended a knee immobilizer, OK to  weightbearing according Ortho Dr. Taylor,   and outpatient follow up. In the ER patient was unable to ambulate due to pain, and lacks sufficient support system at home to help care for her. She will be admitted for PT/OT eval, pain control, and CM/SW consult for assistive services.    Per evaluation from PT/OT, recommendations for postacute placement for additional therapy services prior to discharge home.  Referral to SNF has been placed [facility] has accepted patient.    Patient seen and  examined prior to being discharged.  Patient to use the immobilizer on left leg until discontinued by orthopedics.  Patient to follow-up with orthopedics in 1-2 weeks for management of care.  Patient to continue all home medications.  All questions and concerns answered prior to discharge.  Patient discharged home.        .     Therefore, she is discharged in good and stable condition to home with close outpatient follow-up.    The patient recovered much more quickly than anticipated on admission.    Discharge Date  04/10/24      FOLLOW UP ITEMS POST DISCHARGE  Please call [phone] to schedule PCP appointment for patient.    Required specialty appointments include:       Discharge Instructions per PATTI Joyner    -Follow-up with PCP s/p hospitalization  -Follow-up with orthopedics Dr. Taylor in 1-2 weeks for management of care   -continue physical therapy/Occupational Therapy at [facility]  -Continue all home medications  -Utilize prescribed pain medication for pain management      DIET: As tolerated    ACTIVITY: Okay for weightbearing on left leg    DIAGNOSIS: Left leg fracture    Return to ER if symptoms persist, chest pain, palpitations, shortness of breath, numbness, tingling, weakness, and high fevers.      DISCHARGE DIAGNOSES  Principal Problem:    Closed fracture of proximal end of left tibia (POA: Yes)  Active Problems:    Essential hypertension (Chronic) (POA: Yes)      Overview: Chronic uncontrolled      Patient has multiple high blood pressure readings in the past.      Was started on losartan 25 mg by cardiology a few months back.  Patient       reports compliance taking medication every day.  She is not checking her       blood pressure at home.      Today blood pressure was 152/82 , repeat was about the same 150/78.      no chest pain or shortness of breath, denies lightheadedness or dizziness.                       Type 2 diabetes mellitus without complication (HCC) (POA:  Yes)    Ground-level fall (POA: Yes)  Resolved Problems:    * No resolved hospital problems. *      FOLLOW UP  Future Appointments   Date Time Provider Department Center   4/25/2024  3:20 PM Heidi Medrano M.D. [facility]  [address] [facility] [address]  [phone]        Heidi Medrano M.D.  [address]  [address]  [facility] [id number] [phone]    Schedule an appointment as soon as possible for a visit in 1 week(s)        MEDICATIONS ON DISCHARGE     Medication List        START taking these medications        Instructions   oxyCODONE immediate-release 5 MG Tabs  Commonly known as: Roxicodone   Take 1 Tablet by mouth every 8 hours as needed for Severe Pain for up to 3 days.  Dose: 5 mg            CONTINUE taking these medications        Instructions   Alcohol Swabs Pads   Doctor's comments: Per formulary preference. ICD-10 code: E11.65 Uncontrolled type 2 Diabetes Mellitus  Wipe site with prep pad prior to injection.     aspirin 81 MG EC tablet   Take 162 mg by mouth as needed. 162 mg = 2 tabs  Dose: 162 mg     losartan 50 MG Tabs  Commonly known as: Cozaar   Take 1 Tablet by mouth every day.  Dose: 50 mg     metFORMIN  MG Tb24  Commonly known as: Glucophage XR   TAKE 4 TABLETS BY MOUTH ONCE DAILY              Allergies  No Known Allergies    DIET  Orders Placed This Encounter   Procedures    Diet Order Diet: Regular     Standing Status:   Standing     Number of Occurrences:   1     Order Specific Question:   Diet:     Answer:   Regular [1]       ACTIVITY  As tolerated.  Weight bearing as tolerated    CONSULTATIONS  Orthopedics     PROCEDURES  NONE    IMAGING  CT-KNEE W/O PLUS RECONS LEFT   Final Result         1. Osteopenia.   2. Nondisplaced fracture involving the anterior cortex of the proximal tibia.      DX-KNEE COMPLETE 4+ LEFT   Final Result         1.  No radiographic evidence of acute injury.            LABORATORY  Lab Results   Component Value Date     SODIUM 137 04/10/2024    POTASSIUM 3.8 04/10/2024    CHLORIDE 102 04/10/2024    CO2 21 04/10/2024    GLUCOSE 161 (H) 04/10/2024    BUN 19 04/10/2024    CREATININE 0.95 04/10/2024        Lab Results   Component Value Date    WBC 8.8 04/10/2024    WBC 9.5 10/31/2010    HEMOGLOBIN 13.6 04/10/2024    HEMATOCRIT 39.8 04/10/2024    PLATELETCT 228 04/10/2024

## 2024-04-10 NOTE — PROGRESS NOTES
"      Trauma tertiary and SBIRT per trauma guidelines and quality measures. This note does not constitute as a formal trauma consult. Please defer all management to primary team.     Mental status adequate for full examination?: Yes    Spine cleared (radiologically and/or clinically): Yes    REVIEW OF SYSTEMS:  Review of Systems   Constitutional:  Negative for chills, fever and malaise/fatigue.   HENT:  Negative for hearing loss.    Eyes:  Negative for blurred vision and double vision.   Respiratory:  Negative for shortness of breath.    Cardiovascular:  Negative for chest pain.   Gastrointestinal:  Negative for abdominal pain, nausea and vomiting.   Genitourinary:  Negative for dysuria.   Musculoskeletal:  Positive for joint pain (LLE) and myalgias (sore on left side from fall). Negative for back pain and neck pain.   Skin:  Negative for rash.   Neurological:  Negative for dizziness, tingling, sensory change, speech change, focal weakness and headaches.   Psychiatric/Behavioral:  Negative for substance abuse.        PHYSICAL EXAMINATION:  BP (!) 150/75   Pulse 64   Temp 36.5 °C (97.7 °F) (Temporal)   Resp 16   Ht 1.727 m (5' 8\")   Wt 62.2 kg (137 lb 2 oz)   SpO2 94%   BMI 20.85 kg/m²   Physical Exam  Vitals and nursing note reviewed.   Constitutional:       General: She is awake. She is not in acute distress.     Appearance: She is well-developed. She is not ill-appearing.   HENT:      Head: Normocephalic and atraumatic.      Right Ear: External ear normal.      Left Ear: External ear normal.      Nose: Nose normal.      Mouth/Throat:      Mouth: Mucous membranes are moist.      Pharynx: Oropharynx is clear.   Eyes:      Extraocular Movements: Extraocular movements intact.      Pupils: Pupils are equal, round, and reactive to light.   Cardiovascular:      Rate and Rhythm: Normal rate and regular rhythm.      Pulses: Normal pulses.      Heart sounds: Normal heart sounds. No murmur heard.  Pulmonary:      " Effort: Pulmonary effort is normal. No respiratory distress.      Breath sounds: Normal breath sounds.   Chest:      Chest wall: No tenderness.   Abdominal:      General: Abdomen is flat. Bowel sounds are normal. There is no distension.      Palpations: Abdomen is soft.      Tenderness: There is no abdominal tenderness. There is no guarding.   Musculoskeletal:         General: No swelling, tenderness, deformity or signs of injury.      Cervical back: Normal range of motion and neck supple. No rigidity or tenderness.      Comments: Moves all extremities, good ROM.   Skin:     General: Skin is warm and dry.      Capillary Refill: Capillary refill takes less than 2 seconds.      Coloration: Skin is not pale.      Findings: Bruising (left elbow and left knee) present.   Neurological:      Mental Status: She is alert.      GCS: GCS eye subscore is 4. GCS verbal subscore is 5. GCS motor subscore is 6.   Psychiatric:         Mood and Affect: Mood normal.         Behavior: Behavior normal. Behavior is cooperative.         LABORATORY VALUES:  Recent Labs     04/10/24  0044   WBC 8.8   RBC 4.26   HEMOGLOBIN 13.6   HEMATOCRIT 39.8   MCV 93.4   MCH 31.9   MCHC 34.2   RDW 42.3   PLATELETCT 228   MPV 9.9     Recent Labs     04/10/24  0044   SODIUM 137   POTASSIUM 3.8   CHLORIDE 102   CO2 21   GLUCOSE 161*   BUN 19   CREATININE 0.95   CALCIUM 9.2               IMAGING:  CT-KNEE W/O PLUS RECONS LEFT   Final Result         1. Osteopenia.   2. Nondisplaced fracture involving the anterior cortex of the proximal tibia.      DX-KNEE COMPLETE 4+ LEFT   Final Result         1.  No radiographic evidence of acute injury.          RAP Score Total: 4      CAGE Results: negative Blood Alcohol>0.08: not completed   Occasionally drinks alcohol, denies tobacco, marijuana or illicit drug use.    Mental status adequate for full examination?: Yes    Spine cleared (radiologically and/or clinically): Yes    All current laboratory studies/radiology  exams reviewed: Yes    Medications reconciliation has been reviewed: Yes, management per primary team    Completed Consultations:  Dr. Taylor, orthopedic surgery     Pending Consultations:  None    Newly identified diagnoses, injuries and/or co-morbidities:  None      Trauma tertiary and SBIRT per trauma guidelines and quality measures. This note does not constitute as a formal trauma consult. Please defer all management to primary team.      Discussed patient condition with RN and Patient.

## 2024-04-10 NOTE — PROGRESS NOTES
1345 Called to give report to RN at [facility] with no answer  1355 Called [facility], spoke to the  who stated the receiving RN will call back.   1358 Patient picked up by T for transport to [facility].

## 2024-04-10 NOTE — CONSULTS
84-year-old female ground-level fall.  Left knee pain.  X-ray CT scan reviewed showing nondisplaced proximal tibia fracture.  Recommend weightbearing as tolerated with motion as tolerated.  Okay for follow-up as outpatient when medically appropriate for discharge.        Gerard Taylor MD  Orthopedic Trauma Surgery

## 2024-04-10 NOTE — HOSPITAL COURSE
Sarah Saavedra is a 84 y.o. female with a pmhx of type 2 DM on metformin, and htn.who presented 4/9/2024 after suffering a ground level fall after a family member had fallen on top of her.     According to the patient she was taking care of her son post surgery when he had tripped on there rug and fell on top of her injuring her left knee. Per the patient she did not hit her head and is not currently taking blood thinners. She attempted to walk after her injury but was unable to do so due to significant pain and was unable to bear weight. She denies numbness or tingling or any significant pain when not attempting to bear weight or move her leg. 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     In the ER her initial X-Ray of her knee was negative for any acute fracture. However, ERP ordered a CT which revealed a nondisplaced fracture involving the left anterior cortex of the proximal tibia. Orthopedics was consulted in the ER and reccommended a knee immobilizer, OK to  weightbearing according Ortho Dr. Taylor,   and outpatient follow up. In the ER patient was unable to ambulate due to pain, and lacks sufficient support system at home to help care for her. She will be admitted for PT/OT eval, pain control, and CM/SW consult for assistive services.    Per evaluation from PT/OT, recommendations for postacute placement for additional therapy services prior to discharge home.  Referral to SNF has been placed [facility] has accepted patient.    Patient seen and examined prior to being discharged.  Patient to use the immobilizer on left leg until discontinued by orthopedics.  Patient to follow-up with orthopedics in 1-2 weeks for management of care.  Patient to continue all home medications.  All questions and concerns answered prior to discharge.  Patient discharged home.        .

## 2024-04-10 NOTE — PROGRESS NOTES
Assumed care of pt at 1900. Report received from Day RN. Pt is A&O x 4. Patient stated that their pain is 0/10. Pt's pain comfort goal is 0/10. Pt educated on how to use the call light, pt verbalized understanding. Pt has a purewick. Bed in lowest locked position, call light within reach, hourly rounding in place. Labs reviewed, orders reviewed, communication board updated. Pt has been hypertensive and medicated per the MAR. Pt declines any further needs at this time.
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
## 2024-04-10 NOTE — CARE PLAN
The patient is Stable - Low risk of patient condition declining or worsening    Shift Goals  Clinical Goals: Pain control, htn control  Patient Goals: Rest  Family Goals: COY    Progress made toward(s) clinical / shift goals:      Problem: Fall Risk  Goal: Patient will remain free from falls  Description: Target End Date:  Prior to discharge or change in level of care    Document interventions on the Marieematthew Campbell Fall Risk Assessment    1.  Assess for fall risk factors  2.  Implement fall precautions  Outcome: Progressing     Problem: Discharge Barriers/Planning  Goal: Patient's continuum of care needs are met  Description: Target End Date:  Prior to discharge or change in level of care    1.  Identify potential discharge barriers on admission and throughout hospitalization  2.  Collaborate with Case Management, , Clinical Educators, Navigators and others on the transitional care team to meet discharge needs  3.  Involve patient/family/caregivers in setting and prioritizing goals for hospitalization and discharge  4.  Ensure Flu vaccinations are addressed  5.  Inquire if patient is interested in the Meds to Bed program  6.  Ensure patient and family/caregiver are able to demonstrate use of equipment as prescribed  7.  Ensure patient and family/caregiver can verbalize understanding of patient education  8.  Explain discharge instructions and medication reconciliation to patient and family/caregiver  Outcome: Progressing       Patient is not progressing towards the following goals:

## 2024-04-10 NOTE — THERAPY
Physical Therapy   Initial Evaluation     Patient Name: Ailyn Saavedra  Age:  84 y.o., Sex:  female  Medical Record #: [id number]  Today's Date: 4/10/2024     Precautions  Precautions: Fall Risk;Weight Bearing As Tolerated Left Lower Extremity    Assessment  Patient is 84 y.o. female s/p left-knee pain after suffering a ground-level fall after her son tripped on a rug and fell on top of her. Pt has PMHx of DM II on metformin, HTN, and breast cancer. CT scan showed nondisplaced proximal tibia fracture.    Pt presents to acute physical therapy with impaired ROM, impaired functional strength, impaired balance, and decreased activity tolerance that limits the patient from performing functional activities at her baseline. Pt's mobility is as detailed below. Pt reported fear of movement with LLE due to pain. Provided pt education on weight-bearing precautions and FWW use. Pt demonstrated sit-to-stands with Mod A. Pt demonstrated poor weight shifts in standing and pt could only tolerate taking small steps forward and laterally. Pt provided education in supine exercises to improve functional strength. Recommend post-acute placement to address deficits. Will follow.    Plan    Physical Therapy Initial Treatment Plan   Treatment Plan : Bed Mobility, Equipment, Gait Training, Manual Therapy, Neuro Re-Education / Balance, Self Care / Home Evaluation, Stair Training, Therapeutic Activities, Therapeutic Exercise  Treatment Frequency: 4 Times per Week  Duration: Until Therapy Goals Met    DC Equipment Recommendations: Unable to determine at this time  Discharge Recommendations: Recommend post-acute placement for additional physical therapy services prior to discharge home       Subjective    RN cleared pt for visit. Pt received up sitting EOB with OT. Pt agreeable to PT.     Objective       04/10/24 1005    Services   Is patient using  services for this encounter? No   Initial Contact Note    Initial  "Contact Note Order Received and Verified, Physical Therapy Evaluation in Progress with Full Report to Follow.   Precautions   Precautions Fall Risk;Weight Bearing As Tolerated Left Lower Extremity   Vitals   O2 (LPM) 0   O2 Delivery Device None - Room Air   Vitals Comments Pt asymptomatic throughout visit.   Pain 0 - 10 Group   Location Leg   Location Orientation Left   Pain Rating Scale (NPRS) 6   Description Aching  (\"scary pain\")   Therapist Pain Assessment During Activity;Post Activity Pain Same as Prior to Activity;Nurse Notified;6;7   Prior Living Situation   Prior Services Home-Independent   Housing / Facility 1 Story House   Steps Into Home 2   Steps In Home 0   Bathroom Set up Walk In Shower;Grab Bars   Equipment Owned Front-Wheel Walker   Lives with - Patient's Self Care Capacity Adult Children   Prior Level of Functional Mobility   Bed Mobility Independent   Transfer Status Independent   Ambulation Independent   Ambulation Distance Community   Assistive Devices Used None   Stairs Independent   History of Falls   History of Falls Yes   Date of Last Fall   (reason for admit, son fell on her otherwise.)   Cognition    Cognition / Consciousness WDL   Speech/ Communication Hard of Hearing   Ability To Follow Commands 1 Step   Safety Awareness Impaired   Comments pleasant, cooperative, impulsive, verbose   Passive ROM Lower Body   Passive ROM Lower Body X   Lt Knee Flexion Degrees 90   Comments Limited by pain   Active ROM Lower Body    Active ROM Lower Body  X   Lt Knee Flexion Degrees 90   Comments limited by pain   Strength Lower Body   Lower Body Strength  X   Comments Grossly 3+/5. RLE>LLE   Sensation Lower Body   Lower Extremity Sensation   WDL   Coordination Lower Body    Coordination Lower Body  WDL   Balance Assessment   Sitting Balance (Static) Fair +   Sitting Balance (Dynamic) Fair   Standing Balance (Static) Poor   Standing Balance (Dynamic) Poor -   Weight Shift Sitting Fair   Weight Shift " Standing Poor   Comments Used FWW.   Bed Mobility    Supine to Sit Standby Assist   Sit to Supine Minimal Assist   Scooting Standby Assist   Comments HOB slightly elevated.   Gait Analysis   Gait Level Of Assist Unable to Participate   Comments Limited by pain, balance deficits, decreased tolerance to weight bearing.   Functional Mobility   Sit to Stand Moderate Assist   Transfer Method Stand Pivot   Mobility supine > EOB > STS > lateral steps   Activity Tolerance   Sitting Edge of Bed 15 min   Standing 5 min   Comments Impaired OOB activity   Edema / Skin Assessment   Edema / Skin  WDL   Patient / Family Goals    Patient / Family Goal #1 Increase strength, decrease pain   Short Term Goals    Short Term Goal # 1 Pt will sit<>stand with LRAD with supervision within 6 visits to progress preparation for out of bed activities.   Short Term Goal # 2 Pt will ambulate 150ft with FWW with supervision within 6 visits to progress return to community ambulator.   Short Term Goal # 3 Pt will ascend/descend 2 steps with SBA within 6 visits to progress return to entering prior level of living.   Education Group   Education Provided Role of Physical Therapist;Weight Bearing Status;Exercises - Supine   Role of Physical Therapist Patient Response Patient;Acceptance;Explanation;Verbal Demonstration   Exercises - Supine Patient Response Patient;Acceptance;Explanation;Verbal Demonstration;Handout   Weight Bearing Status Patient Response Patient;Acceptance;Explanation;Verbal Demonstration   Additional Comments Supine exercises given: ankle pumps, knee flexion AROM, straight leg raise, bridges. Pt instructed to complete 5 reps 2-3x day each exercise with pain as tolerated.   Physical Therapy Initial Treatment Plan    Treatment Plan  Bed Mobility;Equipment;Gait Training;Manual Therapy;Neuro Re-Education / Balance;Self Care / Home Evaluation;Stair Training;Therapeutic Activities;Therapeutic Exercise   Treatment Frequency 4 Times per Week    Duration Until Therapy Goals Met   Problem List    Problems Pain;Impaired Bed Mobility;Impaired Transfers;Impaired Ambulation;Functional ROM Deficit;Functional Strength Deficit;Impaired Balance;Decreased Activity Tolerance;Limited Knowledge of Post-Op Precautions;Safety Awareness Deficits / Cognition  (Pt lacks awareness with FWW.)   Anticipated Discharge Equipment and Recommendations   DC Equipment Recommendations Unable to determine at this time   Discharge Recommendations Recommend post-acute placement for additional physical therapy services prior to discharge home   Interdisciplinary Plan of Care Collaboration   IDT Collaboration with  Nursing;Occupational Therapist   Patient Position at End of Therapy In Bed;Call Light within Reach;Tray Table within Reach;Phone within Reach   Collaboration Comments RN aware.   Session Information   Date / Session Number  4/10 - 1 (1/4, 4/16)

## 2024-04-10 NOTE — DISCHARGE PLANNING
-1022  DPA sent [facility] referrals per choice form.     -[phone]  DPA confirmed with Padmini at [facility] that facility can take pt today. Padmini confirmed, DPA set up transport to facility at [phone] with T.
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
## 2024-04-10 NOTE — DISCHARGE PLANNING
Spoke with patient at bedside and updated that have accepting SNF,  patient agreeable. Message  sent to Charly MALIK to update.

## 2024-04-10 NOTE — DISCHARGE PLANNING
Patient accepted to [facility]. COBRA filled out and spoke with patient at bedside. Patient agreeable to transfer and signed COBRA. Patient wishes to update daughter herself. Transfer packet printed. [employer] form faxed to Charly MALIK. Message sent to Charly MALIK to update. D/C summary and RX for pain meds in packet. Transfer Packet given to Jonathan CANALES.

## 2024-04-10 NOTE — THERAPY
"Occupational Therapy   Initial Evaluation     Patient Name: Ailyn Saavedra  Age:  84 y.o., Sex:  female  Medical Record #: [id number]  Today's Date: 4/10/2024     Precautions  Precautions: (P) Fall Risk, Weight Bearing As Tolerated Left Lower Extremity    Assessment  Pt is an 84-year-old female with history of breast cancer, diabetes mellitus, who presented with a fall after her son, who is recovering from surgery (per report cardiac surgery 5 years ago) tripped on the rug and fell on top of her, resulting in LLE non-displaced proximal tibia fracture. Ortho consulted and recommended WBAT & motion as tolerated.   Pt seen for OT eval. Pt mostly limited due to pain with WB of LLE, pt participated in multiple STS, required modA for attempt with mobility and transfers however minimal performance and decreased LLE floor clearance due to increased pain with WB. Pt would not be safe to dc home therefore recommending post acute placement upon dc. Will continue to benefit from inpt OT     Plan    Occupational Therapy Initial Treatment Plan   Treatment Interventions: (P) Self Care / Activities of Daily Living, Therapeutic Exercises, Therapeutic Activity  Treatment Frequency: (P) 4 Times per Week  Duration: (P) Until Therapy Goals Met    DC Equipment Recommendations: (P) Unable to determine at this time  Discharge Recommendations: (P) Recommend post-acute placement for additional occupational therapy services prior to discharge home     Subjective    \"I am usually so independent, I knew he was going to fall on me\"      Objective       04/10/24 0925   Prior Living Situation   Prior Services Home-Independent   Housing / Facility 1 Story House   Steps Into Home 2   Steps In Home 0   Bathroom Set up Walk In Shower;Grab Bars   Equipment Owned Front-Wheel Walker   Lives with - Patient's Self Care Capacity Adult Children  (son is disabled)   Prior Level of ADL Function   Self Feeding Independent   Grooming / Hygiene Independent "   Bathing Independent   Dressing Independent   Toileting Independent   Prior Level of IADL Function   Medication Management Independent   Laundry Independent   Kitchen Mobility Independent   Finances Independent   Home Management Independent   Shopping Independent   Prior Level Of Mobility Independent Without Device in Community   Driving / Transportation Driving Independent   History of Falls   History of Falls Yes   Date of Last Fall   (reason for admit, son fell on her otherwise no falls)   Precautions   Precautions Fall Risk;Weight Bearing As Tolerated Left Lower Extremity   Vitals   Pulse 64   Pulse Oximetry 94 %   O2 Delivery Device None - Room Air   Pain   Intervention Declines   Pain 0 - 10 Group   Location Leg   Location Orientation Left   Pain Rating Scale (NPRS) 6   Therapist Pain Assessment During Activity;Post Activity Pain Same as Prior to Activity;Nurse Notified;8   Cognition    Cognition / Consciousness WDL   Speech/ Communication Hard of Hearing   Ability To Follow Commands 1 Step   Safety Awareness Impaired   Comments pt pleasant, cooperative, able to make needs known and follows all commands, pt can be impulsive however   Passive ROM Upper Body   Passive ROM Upper Body WDL   Active ROM Upper Body   Active ROM Upper Body  X   Dominant Hand Right   Lt Shoulder Flexion Degrees 175   Comments pt reports limiting mobility in L shoulder post fall, no mostly WFL, no pain reported   Strength Upper Body   Upper Body Strength  WDL   Sensation Upper Body   Upper Extremity Sensation  WDL   Upper Body Muscle Tone   Upper Body Muscle Tone  WDL   Neurological Concerns   Neurological Concerns No   Coordination Upper Body   Coordination WDL   Balance Assessment   Sitting Balance (Static) Fair +   Sitting Balance (Dynamic) Fair   Standing Balance (Static) Poor   Standing Balance (Dynamic) Poor -   Weight Shift Sitting Fair   Weight Shift Standing Poor   Comments w/FWW   Bed Mobility    Supine to Sit Standby Assist    Sit to Supine Minimal Assist   Scooting Standby Assist   Comments HOB slightly elevated   ADL Assessment   Eating Independent   Grooming Supervision   Bathing Minimal Assist   Upper Body Dressing Minimal Assist   Lower Body Dressing Minimal Assist   Toileting Maximal Assist   Functional Mobility   Sit to Stand Moderate Assist   Toilet Transfers Unable to Participate   Tub / Shower Transfers Unable to Participate   Transfer Method Stand Pivot   Mobility supine>EOB> STS> side steps   Comments w/FWW. minimal WB on LLE due to pain   Visual Perception   Visual Perception  WDL   Edema / Skin Assessment   Edema / Skin  WDL   Activity Tolerance   Sitting Edge of Bed 15 min   Standing total 5 min   Comments impaired for OOB mobility   Patient / Family Goals   Patient / Family Goal #1 to get stronger   Short Term Goals   Short Term Goal # 1 Pt will complete functional transfers supervised   Short Term Goal # 2 Pt will complete toileting supervised   Short Term Goal # 3 Pt will complete bathing supervised   Short Term Goal # 4 Pt will complete UB/LB dressing supervised   Education Group   Education Provided Role of Occupational Therapist;Activities of Daily Living;Weight Bearing Precautions   Role of Occupational Therapist Patient Response Patient;Acceptance;Explanation;Demonstration;Verbal Demonstration;Action Demonstration   ADL Patient Response Patient;Acceptance;Explanation;Demonstration;Verbal Demonstration;Action Demonstration   Weight Bearing Precautions Patient Response Patient;Acceptance;Explanation;Demonstration;Action Demonstration;Verbal Demonstration   Occupational Therapy Initial Treatment Plan    Treatment Interventions Self Care / Activities of Daily Living;Therapeutic Exercises;Therapeutic Activity   Treatment Frequency 4 Times per Week   Duration Until Therapy Goals Met   Problem List   Problem List Decreased Active Daily Living Skills;Decreased Functional Mobility;Decreased Activity Tolerance;Safety  Awareness Deficits / Cognition   Anticipated Discharge Equipment and Recommendations   DC Equipment Recommendations Unable to determine at this time   Discharge Recommendations Recommend post-acute placement for additional occupational therapy services prior to discharge home   Interdisciplinary Plan of Care Collaboration   IDT Collaboration with  Nursing;Physical Therapist   Patient Position at End of Therapy In Bed;Call Light within Reach;Tray Table within Reach  (handoff to PT)   Collaboration Comments RN and CM updated   Session Information   Date / Session Number  4/10, #1 (1/3, 4/16)

## 2024-04-10 NOTE — CARE PLAN
The patient is Stable - Low risk of patient condition declining or worsening    Shift Goals  Clinical Goals: Pain control, Control blood pressure  Patient Goals: Discharge to Rehab  Family Goals: COY    Progress made toward(s) clinical / shift goals:    Problem: Knowledge Deficit - Standard  Goal: Patient and family/care givers will demonstrate understanding of plan of care, disease process/condition, diagnostic tests and medications  Outcome: Met     Problem: Discharge Barriers/Planning  Goal: Patient's continuum of care needs are met  Outcome: Met       Patient is not progressing towards the following goals:

## 2024-04-10 NOTE — PROGRESS NOTES
Report received, pt care assumed. Pt denies pain at this time. Pt denies any needs. Pt instructed on use of call light. Call light and personal belongings within reach of pt.

## 2024-04-10 NOTE — DISCHARGE PLANNING
Care Transition Team Assessment    Spoke with patient at bedside and verified all information. Live is single story house and adult son lives with patient. Uses no DME. PCP Heidi Medrano. Uses [email or web address] for meds. Daughter will be ride if D/C'd home. Awaiting Therapy evals for recommendations.     Information Source  Orientation Level: Oriented X4  Information Given By: Patient    Readmission Evaluation  Is this a readmission?: No    Interdisciplinary Discharge Planning  Primary Care Physician: Heidi Medrano  Lives with - Patient's Self Care Capacity: Adult Children  Patient or legal guardian wants to designate a caregiver: No  Support Systems: Children  Housing / Facility: [address]  Do You Take your Prescribed Medications Regularly: Yes  Able to Return to Previous ADL's: Future Time w/Therapy  Mobility Issues: Yes  Prior Services: Home-Independent  Patient Prefers to be Discharged to:: Not sure  Assistance Needed: Yes  Durable Medical Equipment: Not Applicable    Discharge Preparedness  What are your discharge supports?: Child  Prior Functional Level: Ambulatory    Functional Assesment  Prior Functional Level: Ambulatory    Finances  Prescription Coverage: Yes    Discharge Risks or Barriers  Patient risk factors: Vulnerable adult    Anticipated Discharge Information  Discharge Contact Phone Number: [phone]

## 2024-04-10 NOTE — H&P
Hospital Medicine History & Physical Note    Date of Service  4/9/2024    Primary Care Physician  Heidi Medrano M.D.    Consultants  orthopedics    Specialist Names: Katey MANCIA    Code Status  Full Code    Chief Complaint  Chief Complaint   Patient presents with    T-5000 GLF     Pt was watching over son after surgery when he became unsteady and fell on pt last night. Pt c/o left knee pain.     Knee Injury     left       History of Presenting Illness  Ailyn Saavedra is a 84 y.o. female who presented 4/9/2024 after suffering a ground level fall after a family member had fallen on top of her. She has a pmhx of type 2 DM on metformin, and htn. According to the patient she was taking care of her son post surgery when he had tripped on there rug and fell on top of her injuring her left knee. Per the patient she did not hit her head and is not currently taking blood thinners. She attempted to walk after her injury but was unable to do so due to significant pain and was unable to bear weight. She denies numbness or tingling or any significant pain when not attempting to bear weight or move her leg. 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    In the ER her initial X-Ray of her knee was negative for any acute fracture. However, ERP ordered a CT which revealed a nondisplaced fracture involving the left anterior cortex of the proximal tibia. Orthopedics was consulted in the ER and reccommended a knee immobilizer, NWB ,and outpatient follow up. In the ER patient was unable to ambulate due to pain, and lacks sufficient support system at home to help care for her. She will be admitted for PT/OT eval, pain control, and CM/SW consult for assistive services.     I discussed the plan of care with patient, family, and bedside RN    Review of Systems  Review of Systems   Constitutional: Negative.    HENT: Negative.     Eyes:  Negative.    Respiratory: Negative.     Cardiovascular: Negative.    Gastrointestinal: Negative.    Genitourinary: Negative.    Musculoskeletal:  Positive for joint pain.        Left Knee   Skin: Negative.    Neurological: Negative.        Past Medical History   has a past medical history of Breast cancer (HCC), Breath shortness, Cancer (HCC), CATARACT, Diabetes (HCC), and Other specified symptom associated with female genital organs.    Surgical History   has a past surgical history that includes lumpectomy; pr radiation therapy plan simple; other; hysteroscopy with video operative (4/3/2013); and dilation and curettage (4/3/2013).     Family History  family history includes Cancer in her mother, sister, sister, and sister.   Family history reviewed with patient. There is no family history that is pertinent to the chief complaint.     Social History   reports that she quit smoking about 65 years ago. Her smoking use included cigarettes. She started smoking about 68 years ago. She has a 1.5 pack-year smoking history. She has never used smokeless tobacco. She reports current alcohol use. She reports that she does not use drugs.    Allergies  No Known Allergies    Medications  Prior to Admission Medications   Prescriptions Last Dose Informant Patient Reported? Taking?   Alcohol Swabs N/A at N/A  No No   Sig: Wipe site with prep pad prior to injection.   aspirin 81 MG EC tablet COUPLE DAYS AGO at PRN Patient Yes No   Sig: Take 162 mg by mouth as needed. 162 mg = 2 tabs   losartan (COZAAR) 50 MG Tab 4/8/2024 at AM Patient No Yes   Sig: Take 1 Tablet by mouth every day.   metFORMIN ER (GLUCOPHAGE XR) 500 MG TABLET SR 24 HR 4/8/2024 at AM Patient No Yes   Sig: TAKE 4 TABLETS BY MOUTH ONCE DAILY      Facility-Administered Medications: None       Physical Exam  Temp:  [36.3 °C (97.4 °F)] 36.3 °C (97.4 °F)  Pulse:  [72-98] 78  Resp:  [16] 16  BP: (107-178)/(54-95) 178/80  SpO2:  [92 %-97 %] 92 %  Blood Pressure : (!) 178/80  "  Temperature: 36.3 °C (97.4 °F)   Pulse: 78   Respiration: 16   Pulse Oximetry: 92 %       Physical Exam  Constitutional:       Appearance: Normal appearance.   HENT:      Head: Normocephalic and atraumatic.      Right Ear: External ear normal.      Left Ear: External ear normal.      Nose: Nose normal.      Mouth/Throat:      Mouth: Mucous membranes are moist.   Eyes:      Extraocular Movements: Extraocular movements intact.      Pupils: Pupils are equal, round, and reactive to light.   Cardiovascular:      Rate and Rhythm: Normal rate.      Pulses: Normal pulses.      Heart sounds: Normal heart sounds.   Pulmonary:      Effort: Pulmonary effort is normal.      Breath sounds: Normal breath sounds.   Abdominal:      General: Abdomen is flat.      Palpations: Abdomen is soft.   Musculoskeletal:         General: Tenderness present.      Comments: Left Knee, Immobilizer in place   Skin:     General: Skin is warm.      Capillary Refill: Capillary refill takes less than 2 seconds.   Neurological:      Mental Status: She is alert.   Psychiatric:         Mood and Affect: Mood normal.         Behavior: Behavior normal.         Laboratory:          No results for input(s): \"ALTSGPT\", \"ASTSGOT\", \"ALKPHOSPHAT\", \"TBILIRUBIN\", \"DBILIRUBIN\", \"GAMMAGT\", \"AMYLASE\", \"LIPASE\", \"ALB\", \"PREALBUMIN\", \"GLUCOSE\" in the last 72 hours.      No results for input(s): \"NTPROBNP\" in the last 72 hours.      No results for input(s): \"TROPONINT\" in the last 72 hours.    Imaging:  CT-KNEE W/O PLUS RECONS LEFT   Final Result         1. Osteopenia.   2. Nondisplaced fracture involving the anterior cortex of the proximal tibia.      DX-KNEE COMPLETE 4+ LEFT   Final Result         1.  No radiographic evidence of acute injury.            Assessment/Plan:  Justification for Admission Status  I anticipate this patient is appropriate for observation status at this time because Left tibial fracture    Patient will need a Med/Surg bed on MEDICAL service .  " The need is secondary to Left Tibia; fractur, ground level fall.    * Ground-level fall- (present on admission)  Assessment & Plan  Patient suffered GLF after family member fell on top of them  Patient cares for disabled son at home  Negative head injury, not currently on blood thinners  CT Left LLE revealed nondisplaced fracture involving anterior cortex of proximal tibia  Orthopedics was consulted, recommending knee immobilizer and outpatient follow up.  Patient lacks support system and  ability to take care of son in current condition  PT/OT eval  CM/SW consult for home health, support resources  Pain control      Type 2 diabetes mellitus without complication (HCC)- (present on admission)  Assessment & Plan  Takes Metformin at home, BG in , last A1c 6.4  Insulin sliding scale   A1c ordered in AM    Essential hypertension- (present on admission)  Assessment & Plan  Cont home losartan        VTE prophylaxis: SCDs/TEDs and heparin ppx

## 2024-04-10 NOTE — DISCHARGE PLANNING
Spoke with patient at bedside and updated patient that therapy recommending SNF. Patient agreeable. Message sent to Charly MALIK for SNF referral to be sent out.

## 2024-05-02 ENCOUNTER — HOME HEALTH ADMISSION (OUTPATIENT)
Dept: HOME HEALTH SERVICES | Facility: HOME HEALTHCARE | Age: 84
End: 2024-05-02
Payer: MEDICARE

## 2024-05-13 ENCOUNTER — TELEPHONE (OUTPATIENT)
Dept: HEALTH INFORMATION MANAGEMENT | Facility: OTHER | Age: 84
End: 2024-05-13

## 2024-05-13 DIAGNOSIS — S82.102A CLOSED FRACTURE OF PROXIMAL END OF LEFT TIBIA, UNSPECIFIED FRACTURE MORPHOLOGY, INITIAL ENCOUNTER: ICD-10-CM

## 2024-05-13 DIAGNOSIS — Z78.0 MENOPAUSE: ICD-10-CM

## 2024-05-13 NOTE — TELEPHONE ENCOUNTER
How would the patient prefer to be contacted with a response: [employer] message    2. SPECIFIC Action To Be Taken: Orders pending, please sign.    3. Diagnosis/Clinical Reason for Request: Unspecified fracture of upper end of left tibia, initial encounter for closed fracture. Due to patient fracture medicare request Osteoporosis screening to be completed within 6 months of fracture date.     4. Specialty & Provider Name/Lab/Imaging Location: [facility]     5. Is appointment scheduled for requested order/referral: no    Patient was not informed they will receive a return phone call from the office ONLY if there are any questions before processing their request. Advised to call back if they haven't received a call from the referral department in 5 days.

## 2024-06-26 ENCOUNTER — TELEPHONE (OUTPATIENT)
Dept: HEALTH INFORMATION MANAGEMENT | Facility: OTHER | Age: 84
End: 2024-06-26

## 2024-11-25 ENCOUNTER — TELEPHONE (OUTPATIENT)
Dept: HEALTH INFORMATION MANAGEMENT | Facility: OTHER | Age: 84
End: 2024-11-25
Payer: MEDICARE

## 2024-12-12 DIAGNOSIS — Z79.4 TYPE 2 DIABETES MELLITUS WITH OTHER SPECIFIED COMPLICATION, WITH LONG-TERM CURRENT USE OF INSULIN (HCC): ICD-10-CM

## 2024-12-12 DIAGNOSIS — E11.69 TYPE 2 DIABETES MELLITUS WITH OTHER SPECIFIED COMPLICATION, WITH LONG-TERM CURRENT USE OF INSULIN (HCC): ICD-10-CM

## 2024-12-12 DIAGNOSIS — I10 ESSENTIAL HYPERTENSION: Chronic | ICD-10-CM

## 2024-12-12 PROCEDURE — RXMED WILLOW AMBULATORY MEDICATION CHARGE: Performed by: FAMILY MEDICINE

## 2024-12-12 RX ORDER — LOSARTAN POTASSIUM 50 MG/1
50 TABLET ORAL DAILY
Qty: 100 TABLET | Refills: 0 | Status: SHIPPED | OUTPATIENT
Start: 2024-12-12

## 2024-12-12 RX ORDER — METFORMIN HYDROCHLORIDE 500 MG/1
TABLET, EXTENDED RELEASE ORAL
Qty: 400 TABLET | Refills: 0 | Status: SHIPPED | OUTPATIENT
Start: 2024-12-12

## 2024-12-16 ENCOUNTER — PHARMACY VISIT (OUTPATIENT)
Dept: PHARMACY | Facility: MEDICAL CENTER | Age: 84
End: 2024-12-16
Payer: COMMERCIAL

## 2025-02-26 ENCOUNTER — HOSPITAL ENCOUNTER (OUTPATIENT)
Facility: MEDICAL CENTER | Age: 85
End: 2025-02-26
Payer: MEDICARE

## 2025-02-26 ENCOUNTER — OFFICE VISIT (OUTPATIENT)
Dept: URGENT CARE | Facility: CLINIC | Age: 85
End: 2025-02-26
Payer: MEDICARE

## 2025-02-26 VITALS
BODY MASS INDEX: 21.22 KG/M2 | DIASTOLIC BLOOD PRESSURE: 62 MMHG | HEIGHT: 68 IN | SYSTOLIC BLOOD PRESSURE: 114 MMHG | RESPIRATION RATE: 16 BRPM | HEART RATE: 80 BPM | OXYGEN SATURATION: 98 % | TEMPERATURE: 98.2 F | WEIGHT: 140 LBS

## 2025-02-26 DIAGNOSIS — N30.00 ACUTE CYSTITIS WITHOUT HEMATURIA: ICD-10-CM

## 2025-02-26 LAB
APPEARANCE UR: CLEAR
BILIRUB UR STRIP-MCNC: NEGATIVE MG/DL
COLOR UR AUTO: YELLOW
GLUCOSE UR STRIP.AUTO-MCNC: NEGATIVE MG/DL
KETONES UR STRIP.AUTO-MCNC: NEGATIVE MG/DL
LEUKOCYTE ESTERASE UR QL STRIP.AUTO: NORMAL
NITRITE UR QL STRIP.AUTO: NEGATIVE
PH UR STRIP.AUTO: 5.5 [PH] (ref 5–8)
PROT UR QL STRIP: NEGATIVE MG/DL
RBC UR QL AUTO: NEGATIVE
SP GR UR STRIP.AUTO: 1.01
UROBILINOGEN UR STRIP-MCNC: 0.2 MG/DL

## 2025-02-26 PROCEDURE — 87086 URINE CULTURE/COLONY COUNT: CPT

## 2025-02-26 RX ORDER — CEFDINIR 300 MG/1
300 CAPSULE ORAL EVERY 12 HOURS
Qty: 10 CAPSULE | Refills: 0 | Status: SHIPPED | OUTPATIENT
Start: 2025-02-26 | End: 2025-02-28 | Stop reason: CLARIF

## 2025-02-26 ASSESSMENT — ENCOUNTER SYMPTOMS
FLANK PAIN: 1
NAUSEA: 0
FEVER: 0
VOMITING: 0

## 2025-02-26 ASSESSMENT — FIBROSIS 4 INDEX: FIB4 SCORE: 1.58

## 2025-02-26 NOTE — PROGRESS NOTES
Subjective:     CHIEF COMPLAINT  Chief Complaint   Patient presents with    UTI     Weakness , back pain getting worse        HPI  Ailyn Saavedra is a very pleasant 84 y.o. female who presents accompanied by her daughter with complaints of right-sided flank pain, fatigue, and increased urinary frequency that started a few days ago.  She denies any pain with urination or increased urinary urgency.  She has not had any fevers, nausea, or vomiting.  She reports a relative  improvement in the flank pain and believes that she may have been dehydrated.    REVIEW OF SYSTEMS  Review of Systems   Constitutional:  Positive for malaise/fatigue. Negative for fever.   Gastrointestinal:  Negative for nausea and vomiting.   Genitourinary:  Positive for flank pain and frequency. Negative for dysuria and urgency.       PAST MEDICAL HISTORY  Patient Active Problem List    Diagnosis Date Noted    Closed fracture of proximal end of left tibia 04/10/2024    Ground-level fall 04/09/2024    TIA (transient ischemic attack) 10/17/2023    Scoliosis of thoracolumbar spine 09/19/2023    Chronic left-sided thoracic back pain 09/19/2023    Vitamin D deficiency 08/01/2023    Chronic kidney disease, stage 3a 05/18/2023    Type 2 diabetes mellitus without complication (HCC) 04/25/2023    Low back pain 04/25/2023    Primary hypertension 03/28/2023    Impacted cerumen of right ear 03/20/2023    Conductive hearing loss, bilateral 03/20/2023    Essential hypertension 10/18/2022    Encounter for CDL (commercial driving license) exam 04/26/2022    Injury of right wrist 04/26/2022    Muscle cramping 03/24/2022    Stress due to illness of family member 08/26/2021    History of breast cancer 09/12/2019    Broken heart syndrome 01/20/2015    Cancer of skin of eyelid 04/30/2014       SURGICAL HISTORY   has a past surgical history that includes lumpectomy; radiation therapy plan simple; other; hysteroscopy with video operative (4/3/2013); and dilation and  "curettage (4/3/2013).    ALLERGIES  No Known Allergies    CURRENT MEDICATIONS  Home Medications       Reviewed by Adrianne Sánchez P.A.-C. (Physician Assistant) on 25 at 1503  Med List Status: <None>     Medication Last Dose Status   Alcohol Swabs Taking Active   aspirin 81 MG EC tablet Taking Active   losartan (COZAAR) 50 MG Tab Taking Active   metFORMIN ER (GLUCOPHAGE XR) 500 MG TABLET SR 24 HR Taking Active                    SOCIAL HISTORY  Social History     Tobacco Use    Smoking status: Former     Current packs/day: 0.00     Average packs/day: 0.5 packs/day for 3.0 years (1.5 ttl pk-yrs)     Types: Cigarettes     Start date: 1956     Quit date: 1959     Years since quittin.2    Smokeless tobacco: Never   Vaping Use    Vaping status: Never Used   Substance and Sexual Activity    Alcohol use: Yes     Comment: Occasionally    Drug use: No    Sexual activity: Not Currently     Partners: Male       FAMILY HISTORY  Family History   Problem Relation Age of Onset    Cancer Mother         breast     Cancer Sister         breast     Cancer Sister         breast     Cancer Sister         breast           Objective:     VITAL SIGNS: /62 (BP Location: Left arm, Patient Position: Sitting, BP Cuff Size: Adult)   Pulse 80   Temp 36.8 °C (98.2 °F) (Temporal)   Resp 16   Ht 1.727 m (5' 8\")   Wt 63.5 kg (140 lb)   SpO2 98%   BMI 21.29 kg/m²     PHYSICAL EXAM  Physical Exam  Vitals reviewed. Exam conducted with a chaperone present.   Constitutional:       General: She is not in acute distress.     Appearance: Normal appearance. She is not ill-appearing or toxic-appearing.   HENT:      Head: Normocephalic and atraumatic.      Nose: Nose normal.      Mouth/Throat:      Mouth: Mucous membranes are moist.   Eyes:      Conjunctiva/sclera: Conjunctivae normal.   Cardiovascular:      Rate and Rhythm: Normal rate.   Pulmonary:      Effort: Pulmonary effort is normal.   Abdominal:      Tenderness: " There is no right CVA tenderness or left CVA tenderness.   Skin:     General: Skin is warm and dry.      Coloration: Skin is not pale.   Neurological:      General: No focal deficit present.      Mental Status: She is alert.   Psychiatric:         Mood and Affect: Mood normal.         Assessment/Plan:     1. Acute cystitis without hematuria  - POCT Urinalysis  - URINE CULTURE(NEW); Future  - cefdinir (OMNICEF) 300 MG Cap; Take 1 Capsule by mouth every 12 hours for 5 days.  Dispense: 10 Capsule; Refill: 0  -Increase hydration  -Avoid excess caffeine/alcohol until UTI symptoms resolve  -Return to clinic if symptoms worsen or fail to resolve      MDM/Comments:  I have prepared for this visit by personally reviewing the patient's prevous medical records, vitals, and labs including: most recent GFR of 59 and Creatinine of 0.95.  Creatinine clearance calculated using MD Calc resource is 43 mL/min.  Renal dosing of cefdinir not indicated.  UA obtained with  moderate leukocytes.  Urine sample has been sent for culture.  Patient has stable vital signs and is non-toxic appearing.  There was no CVA tenderness on physical exam or evidence of pyelonephritis.  Discussed supportive care with hydration, decreased alcohol and caffeine intake until UTI symptoms resolve. Patient instructed to complete full course of antibiotic. Will return if body aches, chills, fever, back/flank pain occur. Discussed signs of kidney infection. Patient demonstrated understanding of plan of care and will RTC if symptoms worsen or fail to resolve.       Differential diagnosis, natural history, supportive care, and indications for immediate follow-up discussed. All questions answered. Patient agrees with the plan of care.    Follow-up as needed if symptoms worsen or fail to improve to PCP, Urgent care or Emergency Room.    I have personally reviewed prior external notes and test results pertinent to today's visit.  I have independently reviewed and  interpreted all diagnostics ordered during this urgent care acute visit.   Discussed management options (risks,benefits, and alternatives to treatment). Pt expresses understanding and the treatment plan was agreed upon. Questions were encouraged and answered to pt's satisfaction.    Please note that this dictation was created using voice recognition software. I have made a reasonable attempt to correct obvious errors, but I expect that there are errors of grammar and possibly content that I did not discover before finalizing the note.

## 2025-02-28 ENCOUNTER — TELEPHONE (OUTPATIENT)
Dept: URGENT CARE | Facility: CLINIC | Age: 85
End: 2025-02-28
Payer: MEDICARE

## 2025-02-28 DIAGNOSIS — Z76.0 MEDICATION REFILL: ICD-10-CM

## 2025-02-28 LAB
BACTERIA UR CULT: NORMAL
SIGNIFICANT IND 70042: NORMAL
SITE SITE: NORMAL
SOURCE SOURCE: NORMAL

## 2025-02-28 RX ORDER — CEFDINIR 300 MG/1
300 CAPSULE ORAL 2 TIMES DAILY
Qty: 8 CAPSULE | Refills: 0 | Status: SHIPPED | OUTPATIENT
Start: 2025-02-28 | End: 2025-03-04

## 2025-03-01 ENCOUNTER — RESULTS FOLLOW-UP (OUTPATIENT)
Dept: URGENT CARE | Facility: CLINIC | Age: 85
End: 2025-03-01
Payer: MEDICARE

## 2025-03-01 LAB
BACTERIA UR CULT: NORMAL
SIGNIFICANT IND 70042: NORMAL
SITE SITE: NORMAL
SOURCE SOURCE: NORMAL

## 2025-03-01 NOTE — TELEPHONE ENCOUNTER
Patient's daughter has been called with voicemail left informing of negative urine culture results.  Discussed symptoms are likely not secondary to a UTI and to discontinue cefdinir at this time.  Discussed I suspect pain may have been musculoskeletal in origin, but to return to the clinic if symptoms are not improving or to follow-up with PCP.